# Patient Record
Sex: MALE | Race: WHITE | NOT HISPANIC OR LATINO | Employment: FULL TIME | URBAN - METROPOLITAN AREA
[De-identification: names, ages, dates, MRNs, and addresses within clinical notes are randomized per-mention and may not be internally consistent; named-entity substitution may affect disease eponyms.]

---

## 2017-01-10 ENCOUNTER — MYC MEDICAL ADVICE (OUTPATIENT)
Dept: FAMILY MEDICINE | Facility: CLINIC | Age: 52
End: 2017-01-10

## 2017-01-10 ENCOUNTER — TELEPHONE (OUTPATIENT)
Dept: FAMILY MEDICINE | Facility: CLINIC | Age: 52
End: 2017-01-10

## 2017-01-10 DIAGNOSIS — E29.1 MALE HYPOGONADISM: Primary | Chronic | ICD-10-CM

## 2017-01-10 NOTE — TELEPHONE ENCOUNTER
Which would be most effective? Pt is fine either way He is worried that he is  not getting the same dosage that he was previously which is his main concern. He said if this is what you want him to do then he is fine with it.    Danica Coley MA

## 2017-01-10 NOTE — TELEPHONE ENCOUNTER
Spoke with pt and he said in the past he was doing 200mg once a month, he just wanted to make sure that the new rx was what you wanted.      Danica Coley

## 2017-01-10 NOTE — TELEPHONE ENCOUNTER
Reason for Call:  Other prescription    Detailed comments: Pt has a question regarding the testosterone  prescripiton that was written and how it was filled     Phone Number Patient can be reached at: Cell number on file:    Telephone Information:   Mobile [phone]       Best Time: Anytime    Can we leave a detailed message on this number? YES    Call taken on 1/10/2017 at 10:58 AM by Torito Garcia

## 2017-01-10 NOTE — TELEPHONE ENCOUNTER
Oh, typically this is dosed every 2 weeks. But if he is always done it once a month, I guess I'd be fine with that. Which weighed as he prefer? Them I will gladly send in the correct prescription

## 2017-01-11 RX ORDER — TESTOSTERONE CYPIONATE 200 MG/ML
100 INJECTION, SOLUTION INTRAMUSCULAR
Qty: 4 ML | Refills: 5 | Status: SHIPPED | OUTPATIENT
Start: 2017-01-11 | End: 2018-12-10

## 2017-07-29 DIAGNOSIS — J45.40 MODERATE PERSISTENT ASTHMA WITHOUT COMPLICATION: Chronic | ICD-10-CM

## 2017-07-29 DIAGNOSIS — E78.5 HYPERLIPIDEMIA LDL GOAL <130: Chronic | ICD-10-CM

## 2017-07-31 NOTE — TELEPHONE ENCOUNTER
montelukast (SINGULAIR) 10 MG tablet      Last Written Prescription Date: 10/27/16  Last Fill Quantity: 90,  # refills: 2   Last Office Visit with [facility], [facility] or [facility] prescribing provider: 11/30/16

## 2017-08-02 ENCOUNTER — MYC REFILL (OUTPATIENT)
Dept: FAMILY MEDICINE | Facility: CLINIC | Age: 52
End: 2017-08-02

## 2017-08-02 ENCOUNTER — MYC MEDICAL ADVICE (OUTPATIENT)
Dept: FAMILY MEDICINE | Facility: CLINIC | Age: 52
End: 2017-08-02

## 2017-08-02 DIAGNOSIS — J45.40 MODERATE PERSISTENT ASTHMA WITHOUT COMPLICATION: Chronic | ICD-10-CM

## 2017-08-02 DIAGNOSIS — E78.5 HYPERLIPIDEMIA LDL GOAL <130: Chronic | ICD-10-CM

## 2017-08-02 RX ORDER — ATORVASTATIN CALCIUM 80 MG/1
80 TABLET, FILM COATED ORAL DAILY
Qty: 60 TABLET | Refills: 0 | Status: SHIPPED | OUTPATIENT
Start: 2017-08-02 | End: 2017-08-02

## 2017-08-02 RX ORDER — MONTELUKAST SODIUM 10 MG/1
TABLET ORAL
Qty: 90 TABLET | Refills: 2 | Status: SHIPPED | OUTPATIENT
Start: 2017-08-02 | End: 2018-07-20

## 2017-08-02 RX ORDER — MONTELUKAST SODIUM 10 MG/1
10 TABLET ORAL AT BEDTIME
Qty: 30 TABLET | Refills: 1 | Status: SHIPPED | OUTPATIENT
Start: 2017-08-02 | End: 2017-08-02

## 2017-08-02 RX ORDER — MONTELUKAST SODIUM 10 MG/1
10 TABLET ORAL AT BEDTIME
Qty: 30 TABLET | Refills: 4 | Status: SHIPPED | OUTPATIENT
Start: 2017-08-02 | End: 2017-12-08

## 2017-08-02 RX ORDER — ATORVASTATIN CALCIUM 80 MG/1
80 TABLET, FILM COATED ORAL DAILY
Qty: 30 TABLET | Refills: 4 | Status: SHIPPED | OUTPATIENT
Start: 2017-08-02 | End: 2017-12-08

## 2017-08-02 NOTE — TELEPHONE ENCOUNTER
2 month refill of lipitor only. Needs to come in for OV and fasting labs to continue it much longer.  VÍCTOR Mccloud, NP-C

## 2017-08-02 NOTE — TELEPHONE ENCOUNTER
Did not realize he had appointment for in Nov/Dec. Will give 4 months refill.  VÍCTOR Mccloud, NP-C

## 2017-08-02 NOTE — TELEPHONE ENCOUNTER
Prescription approved per [facility] Refill Protocol.  Radha Jaramillo RN.     Routing refill request to provider for review/approval because:  Labs out of range:  FLP  Radha Jaramillo RN.

## 2017-08-02 NOTE — TELEPHONE ENCOUNTER
Message from MyChart:  Original authorizing provider: MD Juan Patrick would like a refill of the following medications:  montelukast (SINGULAIR) 10 MG tablet [Kelli Porter MD]  atorvastatin (LIPITOR) 80 MG tablet [Kelli Porter MD]    Preferred pharmacy: [id number] [id number] [address]    Comment:

## 2017-12-08 ENCOUNTER — DOCUMENTATION ONLY (OUTPATIENT)
Dept: LAB | Facility: CLINIC | Age: 52
End: 2017-12-08

## 2017-12-08 ENCOUNTER — OFFICE VISIT (OUTPATIENT)
Dept: FAMILY MEDICINE | Facility: CLINIC | Age: 52
End: 2017-12-08
Payer: COMMERCIAL

## 2017-12-08 VITALS
BODY MASS INDEX: 30.69 KG/M2 | TEMPERATURE: 98.8 F | SYSTOLIC BLOOD PRESSURE: 110 MMHG | DIASTOLIC BLOOD PRESSURE: 80 MMHG | RESPIRATION RATE: 12 BRPM | HEIGHT: 69 IN | HEART RATE: 77 BPM | WEIGHT: 207.2 LBS | OXYGEN SATURATION: 95 %

## 2017-12-08 DIAGNOSIS — Z00.00 ROUTINE GENERAL MEDICAL EXAMINATION AT A HEALTH CARE FACILITY: Primary | ICD-10-CM

## 2017-12-08 DIAGNOSIS — N52.9 ERECTILE DYSFUNCTION, UNSPECIFIED ERECTILE DYSFUNCTION TYPE: Primary | Chronic | ICD-10-CM

## 2017-12-08 DIAGNOSIS — E78.5 HYPERLIPIDEMIA LDL GOAL <130: Chronic | ICD-10-CM

## 2017-12-08 DIAGNOSIS — N52.9 ERECTILE DYSFUNCTION, UNSPECIFIED ERECTILE DYSFUNCTION TYPE: Chronic | ICD-10-CM

## 2017-12-08 DIAGNOSIS — Z23 NEED FOR PROPHYLACTIC VACCINATION AND INOCULATION AGAINST INFLUENZA: ICD-10-CM

## 2017-12-08 DIAGNOSIS — J45.40 MODERATE PERSISTENT ASTHMA WITHOUT COMPLICATION: Chronic | ICD-10-CM

## 2017-12-08 LAB
ALBUMIN SERPL-MCNC: 4.2 G/DL (ref 3.4–5)
ALP SERPL-CCNC: 107 U/L (ref 40–150)
ALT SERPL W P-5'-P-CCNC: 49 U/L (ref 0–70)
ANION GAP SERPL CALCULATED.3IONS-SCNC: 9 MMOL/L (ref 3–14)
AST SERPL W P-5'-P-CCNC: 23 U/L (ref 0–45)
BILIRUB SERPL-MCNC: 0.5 MG/DL (ref 0.2–1.3)
BUN SERPL-MCNC: 13 MG/DL (ref 7–30)
CALCIUM SERPL-MCNC: 9.4 MG/DL (ref 8.5–10.1)
CHLORIDE SERPL-SCNC: 104 MMOL/L (ref 94–109)
CHOLEST SERPL-MCNC: 142 MG/DL
CO2 SERPL-SCNC: 26 MMOL/L (ref 20–32)
CREAT SERPL-MCNC: 1.14 MG/DL (ref 0.66–1.25)
GFR SERPL CREATININE-BSD FRML MDRD: 68 ML/MIN/1.7M2
GLUCOSE SERPL-MCNC: 105 MG/DL (ref 70–99)
HDLC SERPL-MCNC: 65 MG/DL
LDLC SERPL CALC-MCNC: 55 MG/DL
NONHDLC SERPL-MCNC: 77 MG/DL
POTASSIUM SERPL-SCNC: 4.5 MMOL/L (ref 3.4–5.3)
PROT SERPL-MCNC: 7.6 G/DL (ref 6.8–8.8)
PSA SERPL-ACNC: 1.61 UG/L (ref 0–4)
SODIUM SERPL-SCNC: 139 MMOL/L (ref 133–144)
TRIGL SERPL-MCNC: 112 MG/DL

## 2017-12-08 PROCEDURE — G0103 PSA SCREENING: HCPCS | Performed by: FAMILY MEDICINE

## 2017-12-08 PROCEDURE — 99396 PREV VISIT EST AGE 40-64: CPT | Performed by: FAMILY MEDICINE

## 2017-12-08 PROCEDURE — 36415 COLL VENOUS BLD VENIPUNCTURE: CPT | Performed by: FAMILY MEDICINE

## 2017-12-08 PROCEDURE — 80061 LIPID PANEL: CPT | Performed by: FAMILY MEDICINE

## 2017-12-08 PROCEDURE — 80053 COMPREHEN METABOLIC PANEL: CPT | Performed by: FAMILY MEDICINE

## 2017-12-08 RX ORDER — NALTREXONE HYDROCHLORIDE 50 MG/1
TABLET, FILM COATED ORAL
COMMUNITY
Start: 2017-12-04 | End: 2020-09-17

## 2017-12-08 RX ORDER — BUPROPION HYDROCHLORIDE 300 MG/1
300 TABLET ORAL
COMMUNITY
Start: 2017-11-02 | End: 2020-09-17

## 2017-12-08 RX ORDER — VARDENAFIL HYDROCHLORIDE 20 MG/1
20 TABLET ORAL DAILY PRN
Qty: 6 TABLET | Refills: 1 | Status: SHIPPED | OUTPATIENT
Start: 2017-12-08 | End: 2018-12-10

## 2017-12-08 RX ORDER — ATORVASTATIN CALCIUM 80 MG/1
80 TABLET, FILM COATED ORAL DAILY
Qty: 90 TABLET | Refills: 3 | Status: SHIPPED | OUTPATIENT
Start: 2017-12-08 | End: 2018-12-10

## 2017-12-08 RX ORDER — ALBUTEROL SULFATE 90 UG/1
2 AEROSOL, METERED RESPIRATORY (INHALATION) EVERY 4 HOURS PRN
Qty: 1 INHALER | Refills: 5 | Status: SHIPPED | OUTPATIENT
Start: 2017-12-08 | End: 2018-12-10

## 2017-12-08 RX ORDER — CHOLECALCIFEROL (VITAMIN D3) 50 MCG
2000 TABLET ORAL
COMMUNITY

## 2017-12-08 NOTE — MR AVS SNAPSHOT
After Visit Summary   12/8/2017    Juan Treadwell    MRN: [id number]           Patient Information     Date Of Birth          1965        Visit Information        Provider Department      12/8/2017 9:00 AM Kelli Porter MD [facility]        Today's Diagnoses     Routine general medical examination at a health care facility    -  1    Hyperlipidemia LDL goal <130        Moderate persistent asthma without complication        Erectile dysfunction, unspecified erectile dysfunction type        Need for prophylactic vaccination and inoculation against influenza          Care Instructions      Preventive Health Recommendations  Male Ages 50 - 64    Yearly exam:             See your health care provider every year in order to  o   Review health changes.   o   Discuss preventive care.    o   Review your medicines if your doctor has prescribed any.     Have a cholesterol test every 5 years, or more frequently if you are at risk for high cholesterol/heart disease.     Have a diabetes test (fasting glucose) every three years. If you are at risk for diabetes, you should have this test more often.     Have a colonoscopy at age 50, or have a yearly FIT test (stool test). These exams will check for colon cancer.      Talk with your health care provider about whether or not a prostate cancer screening test (PSA) is right for you.    You should be tested each year for STDs (sexually transmitted diseases), if you re at risk.     Shots: Get a flu shot each year. Get a tetanus shot every 10 years.     Nutrition:    Eat at least 5 servings of fruits and vegetables daily.     Eat whole-grain bread, whole-wheat pasta and brown rice instead of white grains and rice.     Talk to your provider about Calcium and Vitamin D.     Lifestyle    Exercise for at least 150 minutes a week (30 minutes a day, 5 days a week). This will help you control your weight and prevent disease.     Limit alcohol to one  "drink per day.     No smoking.     Wear sunscreen to prevent skin cancer.     See your dentist every six months for an exam and cleaning.     See your eye doctor every 1 to 2 years.            Follow-ups after your visit        Follow-up notes from your care team     Return in about 1 year (around 12/8/2018).      Who to contact     If you have questions or need follow up information about today's clinic visit or your schedule please contact [facility] directly at [phone].  Normal or non-critical lab and imaging results will be communicated to you by [employer], letter or phone within 4 business days after the clinic has received the results. If you do not hear from us within 7 days, please contact the clinic through [employer] or phone. If you have a critical or abnormal lab result, we will notify you by phone as soon as possible.  Submit refill requests through [employer] or call your pharmacy and they will forward the refill request to us. Please allow 3 business days for your refill to be completed.          Additional Information About Your Visit        [employer] Information     [employer] gives you secure access to your electronic health record. If you see a primary care provider, you can also send messages to your care team and make appointments. If you have questions, please call your primary care clinic.  If you do not have a primary care provider, please call [phone] and they will assist you.        Care EveryWhere ID     This is your Care EveryWhere ID. This could be used by other organizations to access your [place] medical records  [id number]        Your Vitals Were     Pulse Temperature Respirations Height Pulse Oximetry BMI (Body Mass Index)    77 98.8  F (37.1  C) (Oral) 12 1.753 m (5' 9\") 95% 30.6 kg/m2       Blood Pressure from Last 3 Encounters:   12/08/17 110/80   11/30/16 120/82   09/01/16 132/88    Weight from Last 3 Encounters:   12/08/17 94 kg (207 lb 3.2 oz)   11/30/16 100.9 " kg (222 lb 8 oz)   09/01/16 99.6 kg (219 lb 9.6 oz)              We Performed the Following     Comprehensive metabolic panel     Lipid panel reflex to direct LDL Fasting     Prostate spec antigen screen          Today's Medication Changes          These changes are accurate as of: 12/8/17 11:08 AM.  If you have any questions, ask your nurse or doctor.               Start taking these medicines.        Dose/Directions    vardenafil 20 MG tablet   Commonly known as:  LEVITRA   Used for:  Erectile dysfunction, unspecified erectile dysfunction type   Replaces:  tadalafil 5 MG tablet   Started by:  Kelli Porter MD        Dose:  20 mg   Take 1 tablet (20 mg) by mouth daily as needed 60 min prior to sex. Do not use with nitroglycerin, terazosin or doxazosin.   Quantity:  6 tablet   Refills:  1         Stop taking these medicines if you haven't already. Please contact your care team if you have questions.     tadalafil 5 MG tablet   Commonly known as:  CIALIS   Replaced by:  vardenafil 20 MG tablet   Stopped by:  Kelli Porter MD                Where to get your medicines      These medications were sent to [id number] - [place], [place] - [address] [address]  [address] [address], [facility] [address]     Phone:  [phone]     albuterol 108 (90 BASE) MCG/ACT Inhaler    atorvastatin 80 MG tablet    vardenafil 20 MG tablet                Primary Care Provider Office Phone # Fax #    Kelli Porter [phone] [address] [facility] [address]        Equal Access to Services     [facility] AH: Hadii aad ku hadasho Soomaali, waaxda luqadaha, qaybta kaalmada adeegyada, waxay katelynin hayparish esteban . So [facility] [phone].    ATENCIÓN: Si habla español, tiene a fragoso disposición servicios gratuitos de asistencia lingüística. Llame al [phone].    We comply with applicable federal civil rights laws and [place] laws. We do not  discriminate on the basis of race, color, national origin, age, disability, sex, sexual orientation, or gender identity.            Thank you!     Thank you for choosing [facility]  for your care. Our goal is always to provide you with excellent care. Hearing back from our patients is one way we can continue to improve our services. Please take a few minutes to complete the written survey that you may receive in the mail after your visit with us. Thank you!             Your Updated Medication List - Protect others around you: Learn how to safely use, store and throw away your medicines at www.disposemymeds.org.          This list is accurate as of: 12/8/17 11:08 AM.  Always use your most recent med list.                   Brand Name Dispense Instructions for use Diagnosis    acetaminophen 325 MG tablet    TYLENOL    100 tablet    Take 2 tablets (650 mg) by mouth 4 times daily    Perianal fistula       albuterol 108 (90 BASE) MCG/ACT Inhaler    PROAIR HFA/PROVENTIL HFA/VENTOLIN HFA    1 Inhaler    Inhale 2 puffs into the lungs every 4 hours as needed for shortness of breath / dyspnea or wheezing    Moderate persistent asthma without complication       atorvastatin 80 MG tablet    LIPITOR    90 tablet    Take 1 tablet (80 mg) by mouth daily    Hyperlipidemia LDL goal <130       buPROPion 300 MG 24 hr tablet    WELLBUTRIN XL     Take 300 mg by mouth        ibuprofen 600 MG tablet    ADVIL/MOTRIN    30 tablet    Take 1 tablet (600 mg) by mouth 3 times daily    Perianal fistula       montelukast 10 MG tablet    SINGULAIR    90 tablet    TAKE 1 TABLET (10 MG) BY MOUTH AT BEDTIME    Moderate persistent asthma without complication, Hyperlipidemia LDL goal <130       MULTIVITAMIN PO           naltrexone 50 MG tablet    DEPADE;REVIA     Take one tablet (50 mg) daily        * order for DME      Resmed Airsense 10 auto cpap 10-14 cm, Mirage Fx nasal mask std    DIAMOND (obstructive sleep apnea)       * order for DME      10 Units    Equipment being ordered: 1 cc syringe, with 23 gauge 1 inch needles Use twice monthly    Male hypogonadism       PRILOSEC PO      Take 10 mg by mouth        testosterone cypionate 200 MG/ML injection    DEPOTESTOTERONE    4 mL    Inject 0.5 mLs (100 mg) into the muscle every 14 days    Male hypogonadism       vardenafil 20 MG tablet    LEVITRA    6 tablet    Take 1 tablet (20 mg) by mouth daily as needed 60 min prior to sex. Do not use with nitroglycerin, terazosin or doxazosin.    Erectile dysfunction, unspecified erectile dysfunction type       vitamin D 2000 UNITS tablet      Take 2,000 Units by mouth        * Notice:  This list has 2 medication(s) that are the same as other medications prescribed for you. Read the directions carefully, and ask your doctor or other care provider to review them with you.

## 2017-12-08 NOTE — PROGRESS NOTES
SUBJECTIVE:   CC: Juan Treadwell is an 52 year old male who presents for preventative health visit.     Healthy Habits:    Do you get at least three servings of calcium containing foods daily (dairy, green leafy vegetables, etc.)? yes    Amount of exercise or daily activities, outside of work: 4 day(s) per week    Problems taking medications regularly No    Medication side effects: No    Have you had an eye exam in the past two years? no    Do you see a dentist twice per year? yes    Do you have sleep apnea, excessive snoring or daytime drowsiness?yes              Today's PHQ-2 Score:   PHQ-2 ( 1999 Pfizer) 11/30/2016 11/28/2016   Q1: Little interest or pleasure in doing things 0 -   Q2: Feeling down, depressed or hopeless 0 -   PHQ-2 Score 0 -   Q1: Little interest or pleasure in doing things - Not at all   Q2: Feeling down, depressed or hopeless - Not at all   PHQ-2 Score - 0         Abuse: Current or Past(Physical, Sexual or Emotional)- No  Do you feel safe in your environment - Yes  Social History   Substance Use Topics     Smoking status: Never Smoker     Smokeless tobacco: Never Used      Comment: no 2nd hand smoke exposure     Alcohol use 1.8 - 2.4 oz/week     3 - 4 Standard drinks or equivalent per week      Comment: 3-4 drinks weekly     The patient does not drink >3 drinks per day nor >7 drinks per week.    Last PSA:   PSA   Date Value Ref Range Status   11/30/2016 1.49 0 - 4 ug/L Final     Comment:     Assay Method:  Chemiluminescence using Siemens Vista analyzer       Reviewed orders with patient. Reviewed health maintenance and updated orders accordingly - Yes  Labs reviewed in EPIC  BP Readings from Last 3 Encounters:   12/08/17 110/80   11/30/16 120/82   09/01/16 132/88    Wt Readings from Last 3 Encounters:   12/08/17 94 kg (207 lb 3.2 oz)   11/30/16 100.9 kg (222 lb 8 oz)   09/01/16 99.6 kg (219 lb 9.6 oz)                      Reviewed and updated as needed this visit by clinical staffTobacco   "Allergies  Meds  Med Hx  Surg Hx  Fam Hx  Soc Hx        Reviewed and updated as needed this visit by Provider        Here today for routine checkup  Patient now on Wellbutrin and naltrexone for weight loss and it is working great. Feels fantastic.  Still struggles with erectile dysfunction. We didn't get much benefit from Viagra. His been using Cialis and initially that worked better but now seems to be wearing off. Off treatment for low testosterone but says he doesn't seem to notice much difference one way or the other      ROS:  C: NEGATIVE for fever, chills, change in weight  I: NEGATIVE for worrisome rashes, moles or lesions  E: NEGATIVE for vision changes or irritation  ENT: NEGATIVE for ear, mouth and throat problems  R: NEGATIVE for significant cough or SOB  CV: NEGATIVE for chest pain, palpitations or peripheral edema  GI: NEGATIVE for nausea, abdominal pain, heartburn, or change in bowel habits   male: As above  M: NEGATIVE for significant arthralgias or myalgia  N: NEGATIVE for weakness, dizziness or paresthesias  P: NEGATIVE for changes in mood or affect    OBJECTIVE:   /80 (BP Location: Right arm, Patient Position: Sitting, Cuff Size: Adult Regular)  Pulse 77  Temp 98.8  F (37.1  C) (Oral)  Resp 12  Ht 1.753 m (5' 9\")  Wt 94 kg (207 lb 3.2 oz)  SpO2 95%  BMI 30.6 kg/m2  EXAM:  GENERAL: healthy, alert and no distress  EYES: Eyes grossly normal to inspection, PERRL and conjunctivae and sclerae normal  HENT: ear canals and TM's normal, nose and mouth without ulcers or lesions  NECK: no adenopathy, no asymmetry, masses, or scars and thyroid normal to palpation  RESP: lungs clear to auscultation - no rales, rhonchi or wheezes  CV: regular rate and rhythm, normal S1 S2, no S3 or S4, no murmur, click or rub, no peripheral edema and peripheral pulses strong  ABDOMEN: soft, nontender, no hepatosplenomegaly, no masses and bowel sounds normal  MS: no gross musculoskeletal defects noted, no " "edema  SKIN: no suspicious lesions or rashes  NEURO: Normal strength and tone, mentation intact and speech normal  PSYCH: mentation appears normal, affect normal/bright    ASSESSMENT/PLAN:   1. Routine general medical examination at a health care facility  Health care maintenance up to date otherwise   - Lipid panel reflex to direct LDL Fasting  - Prostate spec antigen screen    2. Hyperlipidemia LDL goal <130    - atorvastatin (LIPITOR) 80 MG tablet; Take 1 tablet (80 mg) by mouth daily  Dispense: 90 tablet; Refill: 3  - Lipid panel reflex to direct LDL Fasting  - Comprehensive metabolic panel    3. Moderate persistent asthma without complication    - albuterol (PROAIR HFA/PROVENTIL HFA/VENTOLIN HFA) 108 (90 BASE) MCG/ACT Inhaler; Inhale 2 puffs into the lungs every 4 hours as needed for shortness of breath / dyspnea or wheezing  Dispense: 1 Inhaler; Refill: 5    4. Erectile dysfunction, unspecified erectile dysfunction type  Discussed mechanism of action of the proposed medication, as well as potential effects, both good and bad.  Patient expressed understanding and agreed with treatment.   - vardenafil (LEVITRA) 20 MG tablet; Take 1 tablet (20 mg) by mouth daily as needed 60 min prior to sex. Do not use with nitroglycerin, terazosin or doxazosin.  Dispense: 6 tablet; Refill: 1    5. Need for prophylactic vaccination and inoculation against influenza        COUNSELING:  Reviewed preventive health counseling, as reflected in patient instructions       Regular exercise       Healthy diet/nutrition       Vision screening       Colon cancer screening       Prostate cancer screening           reports that he has never smoked. He has never used smokeless tobacco.      Estimated body mass index is 30.6 kg/(m^2) as calculated from the following:    Height as of this encounter: 1.753 m (5' 9\").    Weight as of this encounter: 94 kg (207 lb 3.2 oz).         Counseling Resources:  ATP IV Guidelines  Pooled Cohorts Equation " Calculator  FRAX Risk Assessment  ICSI Preventive Guidelines  Dietary Guidelines for Americans, 2010  USDA's MyPlate  ASA Prophylaxis  Lung CA Screening    Kelli Porter MD  [facility]

## 2017-12-08 NOTE — NURSING NOTE
"Chief Complaint   Patient presents with     Physical     fasting       Initial /80 (BP Location: Right arm, Patient Position: Sitting, Cuff Size: Adult Regular)  Pulse 77  Temp 98.8  F (37.1  C) (Oral)  Resp 12  Ht 1.753 m (5' 9\")  Wt 94 kg (207 lb 3.2 oz)  SpO2 95%  BMI 30.6 kg/m2 Estimated body mass index is 30.6 kg/(m^2) as calculated from the following:    Height as of this encounter: 1.753 m (5' 9\").    Weight as of this encounter: 94 kg (207 lb 3.2 oz).  Medication Reconciliation: diana Coley        "

## 2017-12-08 NOTE — PROGRESS NOTES
Patient is requesting to have Testosterone level checked, seen by Physician 12/08/17. Please place orders if needed. CMB

## 2017-12-09 ASSESSMENT — ASTHMA QUESTIONNAIRES: ACT_TOTALSCORE: 25

## 2018-07-20 DIAGNOSIS — N52.9 ERECTILE DYSFUNCTION, UNSPECIFIED ERECTILE DYSFUNCTION TYPE: Primary | Chronic | ICD-10-CM

## 2018-07-20 DIAGNOSIS — J45.40 MODERATE PERSISTENT ASTHMA WITHOUT COMPLICATION: Chronic | ICD-10-CM

## 2018-07-20 DIAGNOSIS — E78.5 HYPERLIPIDEMIA LDL GOAL <130: Chronic | ICD-10-CM

## 2018-07-20 NOTE — TELEPHONE ENCOUNTER
"Requested Prescriptions   Pending Prescriptions Disp Refills     CIALIS 5 MG tablet [Pharmacy Med Name: Cialis Oral Tablet 5 MG]  Last Written Prescription Date:  10/23/17  Last Fill Quantity: 90 tablet,  # refills: 1   Last office visit: 12/8/2017 with prescribing provider:  Dr. Porter   Routing refill request to provider for review/approval because:  Drug not active on patient's medication list  Future Office Visit:   30 tablet 0     Sig: TAKE 1 TABLET (5 MG) BY MOUTH DAILY    Erectile Dysfuction Protocol Passed    7/20/2018  1:41 AM       Passed - Absence of nitrates on medication list       Passed - Absence of Alpha Blockers on Med list       Passed - Recent (12 mo) or future (30 days) visit within the authorizing provider's specialty    Patient had office visit in the last 12 months or has a visit in the next 30 days with authorizing provider or within the authorizing provider's specialty.  See \"Patient Info\" tab in inbasket, or \"Choose Columns\" in Meds & Orders section of the refill encounter.           Passed - Patient is age 18 or older                montelukast (SINGULAIR) 10 MG tablet [Pharmacy Med Name: Montelukast Sodium Oral Tablet 10 MG]  Last Written Prescription Date:  8/2/17  Last Fill Quantity: 90 tablet,  # refills: 2   Last office visit: 12/8/2017 with prescribing provider:  Dr. Porter   Future Office Visit:     30 tablet 0     Sig: TAKE 1 TABLET (10 MG) BY MOUTH AT BEDTIME    Leukotriene Inhibitors Protocol Failed    7/20/2018  1:41 AM       Failed - Asthma control assessment score within normal limits in last 6 months    Please review ACT score.   ACT Total Scores 11/30/2016 12/8/2017   ACT TOTAL SCORE (Goal Greater than or Equal to 20) 25 25   In the past 12 months, how many times did you visit the emergency room for your asthma without being admitted to the hospital? 0 0   In the past 12 months, how many times were you hospitalized overnight because of your asthma? 0 0          Failed - " "Recent (6 mo) or future (30 days) visit within the authorizing provider's specialty    Patient had office visit in the last 6 months or has a visit in the next 30 days with authorizing provider or within the authorizing provider's specialty.  See \"Patient Info\" tab in inbasket, or \"Choose Columns\" in Meds & Orders section of the refill encounter.           Passed - Patient is age 12 or older    If patient is under 16, ok to refill using age based dosing.             "

## 2018-07-23 NOTE — TELEPHONE ENCOUNTER
Routing refill request to provider for review/approval because:Montelukast Sodium Oral Tablet 10 MG  A break in medication      Routing refill request to provider for review/approval because:  Drug not active on patient's medication list      Roxana Arroyo RN

## 2018-07-24 RX ORDER — MONTELUKAST SODIUM 10 MG/1
TABLET ORAL
Qty: 90 TABLET | Refills: 0 | Status: SHIPPED | OUTPATIENT
Start: 2018-07-24 | End: 2018-10-20

## 2018-07-24 RX ORDER — TADALAFIL 5 MG
TABLET ORAL
Qty: 30 TABLET | Refills: 0 | Status: SHIPPED | OUTPATIENT
Start: 2018-07-24 | End: 2018-09-02

## 2018-08-30 DIAGNOSIS — G47.33 OSA (OBSTRUCTIVE SLEEP APNEA): Primary | Chronic | ICD-10-CM

## 2018-09-02 DIAGNOSIS — N52.9 ERECTILE DYSFUNCTION, UNSPECIFIED ERECTILE DYSFUNCTION TYPE: Chronic | ICD-10-CM

## 2018-09-04 NOTE — TELEPHONE ENCOUNTER
"Requested Prescriptions   Pending Prescriptions Disp Refills     CIALIS 5 MG tablet [Pharmacy Med Name: Cialis Oral Tablet 5 MG] 30 tablet 0     Sig: TAKE ONE TABLET BY MOUTH ONE TIME DAILY    Erectile Dysfuction Protocol Passed    9/2/2018 11:00 PM       Passed - Absence of nitrates on medication list       Passed - Absence of Alpha Blockers on Med list       Passed - Recent (12 mo) or future (30 days) visit within the authorizing provider's specialty    Patient had office visit in the last 12 months or has a visit in the next 30 days with authorizing provider or within the authorizing provider's specialty.  See \"Patient Info\" tab in inbasket, or \"Choose Columns\" in Meds & Orders section of the refill encounter.           Passed - Patient is age 18 or older      CIALIS 5 MG tablet  Last Written Prescription Date:  7/24/18  Last Fill Quantity: 30,  # refills: 0   Last office visit: 12/8/2017 with prescribing provider:  Dr. Porter   Future Office Visit:    "

## 2018-09-06 RX ORDER — TADALAFIL 5 MG
TABLET ORAL
Qty: 30 TABLET | Refills: 0 | Status: SHIPPED | OUTPATIENT
Start: 2018-09-06 | End: 2018-10-17

## 2018-09-06 NOTE — TELEPHONE ENCOUNTER
Prescription approved per [facility] Refill Protocol.    Robin Rojas RN, BSN

## 2018-10-20 DIAGNOSIS — J45.40 MODERATE PERSISTENT ASTHMA WITHOUT COMPLICATION: Chronic | ICD-10-CM

## 2018-10-22 NOTE — TELEPHONE ENCOUNTER
"Requested Prescriptions   Pending Prescriptions Disp Refills     montelukast (SINGULAIR) 10 MG tablet [Pharmacy Med Name: Montelukast Sodium Oral Tablet 10 MG] 90 tablet 0     Sig: TAKE ONE TABLET BY MOUTH AT BEDTIME    Leukotriene Inhibitors Protocol Failed    10/20/2018 12:48 AM       Failed - Asthma control assessment score within normal limits in last 6 months    Please review ACT score.          Failed - Recent (6 mo) or future (30 days) visit within the authorizing provider's specialty    Patient had office visit in the last 6 months or has a visit in the next 30 days with authorizing provider or within the authorizing provider's specialty.  See \"Patient Info\" tab in inbasket, or \"Choose Columns\" in Meds & Orders section of the refill encounter.           Passed - Patient is age 12 or older    If patient is under 16, ok to refill using age based dosing.         montelukast (SINGULAIR) 10 MG tablet  Last Written Prescription Date:  7/24/18  Last Fill Quantity: 90,  # refills: 0   Last office visit: 12/8/2017 with prescribing provider:  Dr. Porter   Future Office Visit:   Next 5 appointments (look out 90 days)     Dec 10, 2018  9:00 AM CST   PHYS[facility] Kelli Porter MD   [facility]    [address] [place] [id number] [phone]                   "

## 2018-10-23 RX ORDER — MONTELUKAST SODIUM 10 MG/1
TABLET ORAL
Qty: 90 TABLET | Refills: 0 | Status: SHIPPED | OUTPATIENT
Start: 2018-10-23 | End: 2018-12-10

## 2018-10-23 NOTE — TELEPHONE ENCOUNTER
Routing refill request to provider for review/approval because:  Not current:  ACT  Last OV >6 months.  Payton Maguire RN

## 2018-12-10 ENCOUNTER — OFFICE VISIT (OUTPATIENT)
Dept: FAMILY MEDICINE | Facility: CLINIC | Age: 53
End: 2018-12-10
Payer: COMMERCIAL

## 2018-12-10 VITALS
HEART RATE: 81 BPM | SYSTOLIC BLOOD PRESSURE: 122 MMHG | DIASTOLIC BLOOD PRESSURE: 78 MMHG | WEIGHT: 210 LBS | OXYGEN SATURATION: 91 % | HEIGHT: 69 IN | BODY MASS INDEX: 31.1 KG/M2 | TEMPERATURE: 98.4 F

## 2018-12-10 DIAGNOSIS — Z00.00 ROUTINE GENERAL MEDICAL EXAMINATION AT A HEALTH CARE FACILITY: Primary | ICD-10-CM

## 2018-12-10 DIAGNOSIS — Z23 NEED FOR PROPHYLACTIC VACCINATION AND INOCULATION AGAINST INFLUENZA: ICD-10-CM

## 2018-12-10 DIAGNOSIS — Z11.4 SCREENING FOR HIV (HUMAN IMMUNODEFICIENCY VIRUS): ICD-10-CM

## 2018-12-10 DIAGNOSIS — E78.5 HYPERLIPIDEMIA LDL GOAL <130: Chronic | ICD-10-CM

## 2018-12-10 DIAGNOSIS — M25.512 LEFT SHOULDER PAIN, UNSPECIFIED CHRONICITY: ICD-10-CM

## 2018-12-10 DIAGNOSIS — J45.40 MODERATE PERSISTENT ASTHMA WITHOUT COMPLICATION: Chronic | ICD-10-CM

## 2018-12-10 LAB
ALBUMIN SERPL-MCNC: 4.2 G/DL (ref 3.4–5)
ALP SERPL-CCNC: 101 U/L (ref 40–150)
ALT SERPL W P-5'-P-CCNC: 46 U/L (ref 0–70)
ANION GAP SERPL CALCULATED.3IONS-SCNC: 9 MMOL/L (ref 3–14)
AST SERPL W P-5'-P-CCNC: 26 U/L (ref 0–45)
BILIRUB SERPL-MCNC: 0.4 MG/DL (ref 0.2–1.3)
BUN SERPL-MCNC: 14 MG/DL (ref 7–30)
CALCIUM SERPL-MCNC: 9.4 MG/DL (ref 8.5–10.1)
CHLORIDE SERPL-SCNC: 107 MMOL/L (ref 94–109)
CHOLEST SERPL-MCNC: 170 MG/DL
CO2 SERPL-SCNC: 24 MMOL/L (ref 20–32)
CREAT SERPL-MCNC: 1.17 MG/DL (ref 0.66–1.25)
GFR SERPL CREATININE-BSD FRML MDRD: 65 ML/MIN/1.7M2
GLUCOSE SERPL-MCNC: 104 MG/DL (ref 70–99)
HDLC SERPL-MCNC: 62 MG/DL
LDLC SERPL CALC-MCNC: 81 MG/DL
NONHDLC SERPL-MCNC: 108 MG/DL
POTASSIUM SERPL-SCNC: 4.2 MMOL/L (ref 3.4–5.3)
PROT SERPL-MCNC: 7.7 G/DL (ref 6.8–8.8)
PSA SERPL-ACNC: 1.91 UG/L (ref 0–4)
SODIUM SERPL-SCNC: 140 MMOL/L (ref 133–144)
TRIGL SERPL-MCNC: 135 MG/DL

## 2018-12-10 PROCEDURE — 99396 PREV VISIT EST AGE 40-64: CPT | Performed by: FAMILY MEDICINE

## 2018-12-10 PROCEDURE — 80053 COMPREHEN METABOLIC PANEL: CPT | Performed by: FAMILY MEDICINE

## 2018-12-10 PROCEDURE — 80061 LIPID PANEL: CPT | Performed by: FAMILY MEDICINE

## 2018-12-10 PROCEDURE — G0103 PSA SCREENING: HCPCS | Performed by: FAMILY MEDICINE

## 2018-12-10 PROCEDURE — 36415 COLL VENOUS BLD VENIPUNCTURE: CPT | Performed by: FAMILY MEDICINE

## 2018-12-10 RX ORDER — MONTELUKAST SODIUM 10 MG/1
1 TABLET ORAL AT BEDTIME
Qty: 90 TABLET | Refills: 3 | Status: SHIPPED | OUTPATIENT
Start: 2018-12-10 | End: 2019-12-11

## 2018-12-10 RX ORDER — ATORVASTATIN CALCIUM 80 MG/1
80 TABLET, FILM COATED ORAL DAILY
Qty: 90 TABLET | Refills: 3 | Status: SHIPPED | OUTPATIENT
Start: 2018-12-10 | End: 2019-12-11

## 2018-12-10 ASSESSMENT — PAIN SCALES - GENERAL: PAINLEVEL: SEVERE PAIN (6)

## 2018-12-10 ASSESSMENT — MIFFLIN-ST. JEOR: SCORE: 1788.18

## 2018-12-10 NOTE — PROGRESS NOTES
SUBJECTIVE:   CC: Juan Treadwell is an 53 year old male who presents for preventive health visit.     Healthy Habits:    Do you get at least three servings of calcium containing foods daily (dairy, green leafy vegetables, etc.)? yes    Amount of exercise or daily activities, outside of work: 5 day(s) per week    Problems taking medications regularly No    Medication side effects: No    Have you had an eye exam in the past two years? yes    Do you see a dentist twice per year? yes    Do you have sleep apnea, excessive snoring or daytime drowsiness? Yes- patient is experiencing itchiness at night           Today's PHQ-2 Score:   PHQ-2 ( 1999 Pfizer) 12/10/2018 11/30/2016   Q1: Little interest or pleasure in doing things 0 0   Q2: Feeling down, depressed or hopeless 0 0   PHQ-2 Score 0 0   Q1: Little interest or pleasure in doing things - -   Q2: Feeling down, depressed or hopeless - -   PHQ-2 Score - -       Abuse: Current or Past(Physical, Sexual or Emotional)- No  Do you feel safe in your environment? Yes    Social History     Tobacco Use     Smoking status: Never Smoker     Smokeless tobacco: Never Used     Tobacco comment: no 2nd hand smoke exposure   Substance Use Topics     Alcohol use: Yes     Alcohol/week: 1.8 - 2.4 oz     Types: 3 - 4 Standard drinks or equivalent per week     Comment: 3-4 drinks weekly     If you drink alcohol do you typically have >3 drinks per day or >7 drinks per week? No                      Last PSA:   PSA   Date Value Ref Range Status   12/08/2017 1.61 0 - 4 ug/L Final     Comment:     Assay Method:  Chemiluminescence using Siemens Vista analyzer       Reviewed orders with patient. Reviewed health maintenance and updated orders accordingly - Yes  Labs reviewed in EPIC  BP Readings from Last 3 Encounters:   12/10/18 122/78   12/08/17 110/80   11/30/16 120/82    Wt Readings from Last 3 Encounters:   12/10/18 95.3 kg (210 lb)   12/08/17 94 kg (207 lb 3.2 oz)   11/30/16 100.9 kg (222 lb  "8 oz)                    Reviewed and updated as needed this visit by clinical staff  Tobacco  Allergies  Meds  Problems         Reviewed and updated as needed this visit by Provider  Problems        Here today for routine checkup.  Doing well overall.  Feeling great.  Has decided he is going to back off on the weight loss medications for a little while but is still keeping up with exercise.  Cialis is working well to help with erectile dysfunction.    ROS:  CONSTITUTIONAL: NEGATIVE for fever, chills, change in weight  INTEGUMENTARY/SKIN: NEGATIVE for worrisome rashes, moles or lesions  EYES: NEGATIVE for vision changes or irritation  ENT: NEGATIVE for ear, mouth and throat problems  RESP: NEGATIVE for significant cough or SOB  CV: NEGATIVE for chest pain, palpitations or peripheral edema  GI: NEGATIVE for nausea, abdominal pain, heartburn, or change in bowel habits   male: negative for dysuria, hematuria, decreased urinary stream, erectile dysfunction, urethral discharge  MUSCULOSKELETAL: NEGATIVE for significant arthralgias or myalgia  NEURO: NEGATIVE for weakness, dizziness or paresthesias  PSYCHIATRIC: NEGATIVE for changes in mood or affect    OBJECTIVE:   /78 (BP Location: Right arm, Patient Position: Chair, Cuff Size: Adult Large)   Pulse 81   Temp 98.4  F (36.9  C) (Oral)   Ht 1.745 m (5' 8.7\")   Wt 95.3 kg (210 lb)   SpO2 91%   BMI 31.28 kg/m    EXAM:  GENERAL: healthy, alert and no distress  EYES: Eyes grossly normal to inspection, PERRL and conjunctivae and sclerae normal  HENT: ear canals and TM's normal, nose and mouth without ulcers or lesions  NECK: no adenopathy, no asymmetry, masses, or scars and thyroid normal to palpation  RESP: lungs clear to auscultation - no rales, rhonchi or wheezes  CV: regular rate and rhythm, normal S1 S2, no S3 or S4, no murmur, click or rub, no peripheral edema and peripheral pulses strong  ABDOMEN: soft, nontender, no hepatosplenomegaly, no masses and " "bowel sounds normal  MS: no gross musculoskeletal defects noted, no edema  SKIN: no suspicious lesions or rashes  NEURO: Normal strength and tone, mentation intact and speech normal  PSYCH: mentation appears normal, affect normal/bright    Diagnostic Test Results:  none     ASSESSMENT/PLAN:   1. Routine general medical examination at a health care facility  Routine healthcare maintenance up-to-date  - Lipid panel reflex to direct LDL Fasting  - Prostate spec antigen screen    2. Hyperlipidemia LDL goal <130    - atorvastatin (LIPITOR) 80 MG tablet; Take 1 tablet (80 mg) by mouth daily  Dispense: 90 tablet; Refill: 3  - Lipid panel reflex to direct LDL Fasting  - Comprehensive metabolic panel    3. Moderate persistent asthma without complication    - montelukast (SINGULAIR) 10 MG tablet; Take 1 tablet (10 mg) by mouth At Bedtime  Dispense: 90 tablet; Refill: 3    4. Need for prophylactic vaccination and inoculation against influenza    COUNSELING:  Reviewed preventive health counseling, as reflected in patient instructions       Regular exercise       Healthy diet/nutrition       Vision screening       Colon cancer screening       Prostate cancer screening    BP Readings from Last 1 Encounters:   12/10/18 122/78     Estimated body mass index is 31.28 kg/m  as calculated from the following:    Height as of this encounter: 1.745 m (5' 8.7\").    Weight as of this encounter: 95.3 kg (210 lb).           reports that  has never smoked. he has never used smokeless tobacco.      Counseling Resources:  ATP IV Guidelines  Pooled Cohorts Equation Calculator  FRAX Risk Assessment  ICSI Preventive Guidelines  Dietary Guidelines for Americans, 2010  USDA's MyPlate  ASA Prophylaxis  Lung CA Screening    Kelli Porter MD  [facility]  "

## 2018-12-11 ASSESSMENT — ASTHMA QUESTIONNAIRES: ACT_TOTALSCORE: 25

## 2018-12-13 ENCOUNTER — THERAPY VISIT (OUTPATIENT)
Dept: PHYSICAL THERAPY | Facility: CLINIC | Age: 53
End: 2018-12-13
Attending: FAMILY MEDICINE
Payer: COMMERCIAL

## 2018-12-13 DIAGNOSIS — M25.512 LEFT SHOULDER PAIN, UNSPECIFIED CHRONICITY: ICD-10-CM

## 2018-12-13 PROCEDURE — 97112 NEUROMUSCULAR REEDUCATION: CPT | Mod: GP | Performed by: PHYSICAL THERAPIST

## 2018-12-13 PROCEDURE — 97161 PT EVAL LOW COMPLEX 20 MIN: CPT | Mod: GP | Performed by: PHYSICAL THERAPIST

## 2018-12-13 PROCEDURE — 97140 MANUAL THERAPY 1/> REGIONS: CPT | Mod: GP | Performed by: PHYSICAL THERAPIST

## 2018-12-13 NOTE — PROGRESS NOTES
[facility] Initial Evaluation  Subjective:  The history is provided by the patient. No  was used.   Juan Treadwell is a 53 year old male with a left shoulder condition.  Condition occurred with:  Unknown cause.  Condition occurred: other.  This is a new condition  10/13/2018.        Pain is described as aching and burning  and reported as 4/10.   Pain is worse in the P.M..  Symptoms are exacerbated by carrying, rest and using arm overhead   Since onset symptoms are unchanged.    Previous treatment includes other (injection).  There was no improvement following previous treatment.    Past medical history: asthma.  Medical allergies: no.    Current medications:  None as reported by the patient.  Current occupation is   .  Patient is working in normal job without restrictions.  Primary job tasks include:  Prolonged sitting.    Barriers include:  None as reported by the patient.    Red flags:  None as reported by the patient.    Goal: lift the left arm, strengthen the left arm and sleeping without increased pain.                    Objective:  Standing Alignment:    Cervical/Thoracic:  Forward head  Shoulder/UE:  Rounded shoulders                                       Shoulder Evaluation:  ROM:  AROM:    Flexion:  Left:  150    Right:  145    Abduction:  Left: 155   Right:  155    Internal Rotation:  Left:  T8    Right:  T10  External Rotation:  Left:  60    Right:  45                      Strength:    Flexion: Left:4+/5 Strong/painful    Pain:    Right: 4+/5  Strong/pain free     Pain:   Extension:  Left: 4/5  Strong/pain free    Pain:    Right: 4-/5    Strong/pain free  Pain:  Abduction:  Left: 4/5  Pain:    Right: 4+/5     Pain:    Internal Rotation:  Left:4+/5   Strong/pain free    Pain:    Right: 4+/5   Strong/pain free    Pain:  External Rotation:   Left:4-/5   Strong/painful    Pain:   Right:4+/5   Strong/pain free    Pain:    Horizontal Abduction:   Left:4-/5   Strong/pain free    Pain:    Right:4-/5   Strong/pain free   Pain:          Special Tests:      Left shoulder negative for the following special tests:  Impingement    Right shoulder negative for the following special tests:Impingement  Palpation:    Left shoulder tenderness present at:  Supraspinatus; Teres Minor; Subscapularis and Levator  Right shoulder tenderness present at: Teres Minor; Subscapularis and Levator                                     General     ROS    Assessment/Plan:    Patient is a 53 year old male with Left Shoulder complaints.    Patient has the following significant findings with corresponding treatment plan.                Diagnosis 1:  Left Shoulder Pain  Pain -  hot/cold therapy, US, electric stimulation, manual therapy, STS, splint/taping/bracing/orthotics, self management, education, directional preference exercise and home program  Decreased ROM/flexibility - manual therapy and therapeutic exercise  Decreased joint mobility - manual therapy and therapeutic exercise  Decreased strength - therapeutic exercise and therapeutic activities  Impaired posture - neuro re-education    Therapy Evaluation Codes:   1) History comprised of:   Personal factors that impact the plan of care:      None.    Comorbidity factors that impact the plan of care are:      asthma.     Medications impacting care: None.  2) Examination of Body Systems comprised of:   Body structures and functions that impact the plan of care:      B shoulders.   Activity limitations that impact the plan of care are:      lifting overhead, reaching, sleeping supine.  3) Clinical presentation characteristics are:   Stable/Uncomplicated.  4) Decision-Making    Low complexity using standardized patient assessment instrument and/or measureable assessment of functional outcome.  Cumulative Therapy Evaluation is: Low complexity.    Previous and current functional limitations:  (See Goal Flow Sheet for this information)     Short term and Long term goals: (See Goal Flow Sheet for this information)     Communication ability:  Patient appears to be able to clearly communicate and understand verbal and written communication and follow directions correctly.  Treatment Explanation - The following has been discussed with the patient:   RX ordered/plan of care  Anticipated outcomes  Possible risks and side effects  This patient would benefit from PT intervention to resume normal activities.   Rehab potential is good.    Frequency:  1 X week, once daily  Duration:  for 6 weeks  Discharge Plan:  Achieve all LTG.  Independent in home treatment program.  Reach maximal therapeutic benefit.    Please refer to the daily flowsheet for treatment today, total treatment time and time spent performing 1:1 timed codes.

## 2018-12-20 ENCOUNTER — THERAPY VISIT (OUTPATIENT)
Dept: PHYSICAL THERAPY | Facility: CLINIC | Age: 53
End: 2018-12-20
Payer: COMMERCIAL

## 2018-12-20 DIAGNOSIS — M25.512 SHOULDER PAIN, LEFT: Primary | ICD-10-CM

## 2018-12-20 PROCEDURE — 97140 MANUAL THERAPY 1/> REGIONS: CPT | Mod: GP | Performed by: PHYSICAL THERAPIST

## 2018-12-20 PROCEDURE — 97112 NEUROMUSCULAR REEDUCATION: CPT | Mod: GP | Performed by: PHYSICAL THERAPIST

## 2018-12-20 PROCEDURE — 97110 THERAPEUTIC EXERCISES: CPT | Mod: GP | Performed by: PHYSICAL THERAPIST

## 2019-04-29 DIAGNOSIS — N52.9 ERECTILE DYSFUNCTION, UNSPECIFIED ERECTILE DYSFUNCTION TYPE: Chronic | ICD-10-CM

## 2019-04-29 NOTE — TELEPHONE ENCOUNTER
"Requested Prescriptions   Pending Prescriptions Disp Refills     tadalafil (CIALIS) 5 MG tablet [Pharmacy Med Name: Tadalafil Oral Tablet 5 MG] 30 tablet 4     Sig: TAKE 1 TABLET BY MOUTH ONE TIME DAILY       Last Written Prescription Date:  10/17/18  Last Fill Quantity: 30,  # refills: 5   Last office visit: 12/10/2018 with prescribing provider:     Future Office Visit:      Erectile Dysfuction Protocol Passed - 4/29/2019  8:40 AM        Passed - Absence of nitrates on medication list        Passed - Absence of Alpha Blockers on Med list        Passed - Recent (12 mo) or future (30 days) visit within the authorizing provider's specialty     Patient had office visit in the last 12 months or has a visit in the next 30 days with authorizing provider or within the authorizing provider's specialty.  See \"Patient Info\" tab in inbasket, or \"Choose Columns\" in Meds & Orders section of the refill encounter.              Passed - Medication is active on med list        Passed - Patient is age 18 or older          "

## 2019-04-30 RX ORDER — TADALAFIL 5 MG/1
TABLET ORAL
Qty: 30 TABLET | Refills: 4 | Status: SHIPPED | OUTPATIENT
Start: 2019-04-30 | End: 2019-10-03

## 2019-04-30 NOTE — TELEPHONE ENCOUNTER
Prescription approved per [facility] Refill Protocol.    Robin Rojas RN, BSN

## 2019-06-14 NOTE — PROGRESS NOTES
Patient did not return for follow up treatments as directed.  Goal status and current objective information is therefore unknown.  Discharge from PT services at this time for this episode of treatment. Please see attached documentation under this episode of care for further information including dates of service, start of care date, referring physician, Dx, treatment plan, treatments, etc.    Please contact me with any questions or concerns.    Thank you for your referral.    Britney Manjarrez, PT, DPT

## 2019-09-28 ENCOUNTER — HEALTH MAINTENANCE LETTER (OUTPATIENT)
Age: 54
End: 2019-09-28

## 2019-10-03 DIAGNOSIS — N52.9 ERECTILE DYSFUNCTION, UNSPECIFIED ERECTILE DYSFUNCTION TYPE: Chronic | ICD-10-CM

## 2019-10-04 NOTE — TELEPHONE ENCOUNTER
"Requested Prescriptions   Pending Prescriptions Disp Refills     tadalafil (CIALIS) 5 MG tablet [Pharmacy Med Name: Tadalafil Oral Tablet 5 MG]  Last Written Prescription Date:  4/30/19  Last Fill Quantity: 30 tablet,  # refills: 4   Last office visit: 12/10/2018 with prescribing provider:  Dr. Porter   Future Office Visit:     30 tablet 3     Sig: TAKE 1 TABLET BY MOUTH ONE TIME DAILY       Erectile Dysfuction Protocol Passed - 10/3/2019 11:08 PM        Passed - Absence of nitrates on medication list        Passed - Absence of Alpha Blockers on Med list        Passed - Recent (12 mo) or future (30 days) visit within the authorizing provider's specialty     Patient has had an office visit with the authorizing provider or a provider within the authorizing providers department within the previous 12 mos or has a future within next 30 days. See \"Patient Info\" tab in inbasket, or \"Choose Columns\" in Meds & Orders section of the refill encounter.              Passed - Medication is active on med list        Passed - Patient is age 18 or older          "

## 2019-10-07 RX ORDER — TADALAFIL 5 MG/1
TABLET ORAL
Qty: 30 TABLET | Refills: 1 | Status: SHIPPED | OUTPATIENT
Start: 2019-10-07 | End: 2019-12-11

## 2019-10-07 NOTE — TELEPHONE ENCOUNTER
Medication is being filled for 1 time refill only due to:  Patient needs to be seen because due for yearly office visit in December 2019.           Robin Rojas RN, BSN, PHN

## 2019-11-21 ENCOUNTER — MYC MEDICAL ADVICE (OUTPATIENT)
Dept: FAMILY MEDICINE | Facility: CLINIC | Age: 54
End: 2019-11-21

## 2019-12-07 DIAGNOSIS — N52.9 ERECTILE DYSFUNCTION, UNSPECIFIED ERECTILE DYSFUNCTION TYPE: Chronic | ICD-10-CM

## 2019-12-07 DIAGNOSIS — E78.5 HYPERLIPIDEMIA LDL GOAL <130: Chronic | ICD-10-CM

## 2019-12-09 NOTE — TELEPHONE ENCOUNTER
"Requested Prescriptions   Pending Prescriptions Disp Refills     tadalafil (CIALIS) 5 MG tablet [Pharmacy Med Name: Tadalafil Oral Tablet 5 MG] 30 tablet 0     Sig: TAKE 1 TABLET BY MOUTH ONE TIME DAILY       Erectile Dysfuction Protocol Passed - 12/7/2019  7:11 PM        Passed - Absence of nitrates on medication list        Passed - Absence of Alpha Blockers on Med list        Passed - Recent (12 mo) or future (30 days) visit within the authorizing provider's specialty     Patient has had an office visit with the authorizing provider or a provider within the authorizing providers department within the previous 12 mos or has a future within next 30 days. See \"Patient Info\" tab in inbasket, or \"Choose Columns\" in Meds & Orders section of the refill encounter.              Passed - Medication is active on med list        Passed - Patient is age 18 or older        atorvastatin (LIPITOR) 80 MG tablet [Pharmacy Med Name: Atorvastatin Calcium Oral Tablet 80 MG] 90 tablet 2     Sig: TAKE 1 TABLET BY MOUTH ONE TIME DAILY       Statins Protocol Passed - 12/7/2019  7:11 PM        Passed - LDL on file in past 12 months     Recent Labs   Lab Test 12/10/18  0937   LDL 81             Passed - No abnormal creatine kinase in past 12 months     No lab results found.             Passed - Recent (12 mo) or future (30 days) visit within the authorizing provider's specialty     Patient has had an office visit with the authorizing provider or a provider within the authorizing providers department within the previous 12 mos or has a future within next 30 days. See \"Patient Info\" tab in inbasket, or \"Choose Columns\" in Meds & Orders section of the refill encounter.              Passed - Medication is active on med list        Passed - Patient is age 18 or older        tadalafil (CIALIS) 5 MG tablet  Last Written Prescription Date:  10/7/19  Last Fill Quantity: 30,  # refills: 1   Last office visit: 12/10/2018 with prescribing provider:  " Dr. Porter   Future Office Visit:   Next 5 appointments (look out 90 days)    Dec 11, 2019  9:20 AM CST  PHYSICAL with Kelli Porter MD  [facility] [address] [place] [id number]  [phone]           atorvastatin (LIPITOR) 80 MG tablet   Last Written Prescription Date:  12/10/18  Last Fill Quantity: 90,  # refills: 3   Last office visit: 12/10/2018 with prescribing provider:  Dr. Porter   Future Office Visit:   Next 5 appointments (look out 90 days)    Dec 11, 2019  9:20 AM CST  PHYSICAL with Kelli Porter MD  [facility] [address] [facility] [id number]  [phone]

## 2019-12-10 NOTE — TELEPHONE ENCOUNTER
Patient has appointment tomorrow. Please review request at appointment.     Payton Maguire RN  [facility]

## 2019-12-11 ENCOUNTER — OFFICE VISIT (OUTPATIENT)
Dept: FAMILY MEDICINE | Facility: CLINIC | Age: 54
End: 2019-12-11
Payer: COMMERCIAL

## 2019-12-11 VITALS
HEART RATE: 84 BPM | DIASTOLIC BLOOD PRESSURE: 83 MMHG | WEIGHT: 220 LBS | BODY MASS INDEX: 32.58 KG/M2 | TEMPERATURE: 97.9 F | SYSTOLIC BLOOD PRESSURE: 118 MMHG | HEIGHT: 69 IN | OXYGEN SATURATION: 92 %

## 2019-12-11 DIAGNOSIS — E78.5 HYPERLIPIDEMIA LDL GOAL <130: Chronic | ICD-10-CM

## 2019-12-11 DIAGNOSIS — J45.40 MODERATE PERSISTENT ASTHMA WITHOUT COMPLICATION: Chronic | ICD-10-CM

## 2019-12-11 DIAGNOSIS — Z00.00 ROUTINE GENERAL MEDICAL EXAMINATION AT A HEALTH CARE FACILITY: Primary | ICD-10-CM

## 2019-12-11 DIAGNOSIS — G47.33 OSA (OBSTRUCTIVE SLEEP APNEA): ICD-10-CM

## 2019-12-11 DIAGNOSIS — N52.9 ERECTILE DYSFUNCTION, UNSPECIFIED ERECTILE DYSFUNCTION TYPE: Chronic | ICD-10-CM

## 2019-12-11 LAB
ALBUMIN SERPL-MCNC: 4.2 G/DL (ref 3.4–5)
ALP SERPL-CCNC: 117 U/L (ref 40–150)
ALT SERPL W P-5'-P-CCNC: 48 U/L (ref 0–70)
ANION GAP SERPL CALCULATED.3IONS-SCNC: 9 MMOL/L (ref 3–14)
AST SERPL W P-5'-P-CCNC: 17 U/L (ref 0–45)
BILIRUB SERPL-MCNC: 0.3 MG/DL (ref 0.2–1.3)
BUN SERPL-MCNC: 14 MG/DL (ref 7–30)
CALCIUM SERPL-MCNC: 9.4 MG/DL (ref 8.5–10.1)
CHLORIDE SERPL-SCNC: 107 MMOL/L (ref 94–109)
CHOLEST SERPL-MCNC: 181 MG/DL
CO2 SERPL-SCNC: 24 MMOL/L (ref 20–32)
CREAT SERPL-MCNC: 1.08 MG/DL (ref 0.66–1.25)
ERYTHROCYTE [DISTWIDTH] IN BLOOD BY AUTOMATED COUNT: 12.9 % (ref 10–15)
GFR SERPL CREATININE-BSD FRML MDRD: 78 ML/MIN/{1.73_M2}
GLUCOSE SERPL-MCNC: 113 MG/DL (ref 70–99)
HCT VFR BLD AUTO: 43.3 % (ref 40–53)
HDLC SERPL-MCNC: 52 MG/DL
HGB BLD-MCNC: 14.6 G/DL (ref 13.3–17.7)
LDLC SERPL CALC-MCNC: 79 MG/DL
MCH RBC QN AUTO: 31.5 PG (ref 26.5–33)
MCHC RBC AUTO-ENTMCNC: 33.7 G/DL (ref 31.5–36.5)
MCV RBC AUTO: 93 FL (ref 78–100)
NONHDLC SERPL-MCNC: 129 MG/DL
PLATELET # BLD AUTO: 338 10E9/L (ref 150–450)
POTASSIUM SERPL-SCNC: 4 MMOL/L (ref 3.4–5.3)
PROT SERPL-MCNC: 7.6 G/DL (ref 6.8–8.8)
PSA SERPL-ACNC: 2.06 UG/L (ref 0–4)
RBC # BLD AUTO: 4.64 10E12/L (ref 4.4–5.9)
SODIUM SERPL-SCNC: 140 MMOL/L (ref 133–144)
TRIGL SERPL-MCNC: 249 MG/DL
TSH SERPL DL<=0.005 MIU/L-ACNC: 2.2 MU/L (ref 0.4–4)
WBC # BLD AUTO: 7.9 10E9/L (ref 4–11)

## 2019-12-11 PROCEDURE — 85027 COMPLETE CBC AUTOMATED: CPT | Performed by: FAMILY MEDICINE

## 2019-12-11 PROCEDURE — G0103 PSA SCREENING: HCPCS | Performed by: FAMILY MEDICINE

## 2019-12-11 PROCEDURE — 80053 COMPREHEN METABOLIC PANEL: CPT | Performed by: FAMILY MEDICINE

## 2019-12-11 PROCEDURE — 36415 COLL VENOUS BLD VENIPUNCTURE: CPT | Performed by: FAMILY MEDICINE

## 2019-12-11 PROCEDURE — 84443 ASSAY THYROID STIM HORMONE: CPT | Performed by: FAMILY MEDICINE

## 2019-12-11 PROCEDURE — 99396 PREV VISIT EST AGE 40-64: CPT | Performed by: FAMILY MEDICINE

## 2019-12-11 PROCEDURE — 80061 LIPID PANEL: CPT | Performed by: FAMILY MEDICINE

## 2019-12-11 RX ORDER — MONTELUKAST SODIUM 10 MG/1
1 TABLET ORAL AT BEDTIME
Qty: 90 TABLET | Refills: 3 | Status: SHIPPED | OUTPATIENT
Start: 2019-12-11 | End: 2020-12-11

## 2019-12-11 RX ORDER — FAMOTIDINE 10 MG
10 TABLET ORAL 2 TIMES DAILY
COMMUNITY
End: 2021-06-23

## 2019-12-11 RX ORDER — ATORVASTATIN CALCIUM 80 MG/1
TABLET, FILM COATED ORAL
Qty: 90 TABLET | Refills: 2 | OUTPATIENT
Start: 2019-12-11

## 2019-12-11 RX ORDER — TADALAFIL 5 MG/1
TABLET ORAL
Qty: 30 TABLET | Refills: 0 | OUTPATIENT
Start: 2019-12-11

## 2019-12-11 RX ORDER — ATORVASTATIN CALCIUM 80 MG/1
80 TABLET, FILM COATED ORAL DAILY
Qty: 90 TABLET | Refills: 3 | Status: SHIPPED | OUTPATIENT
Start: 2019-12-11 | End: 2020-12-11

## 2019-12-11 RX ORDER — TADALAFIL 5 MG/1
TABLET ORAL
Qty: 90 TABLET | Refills: 3 | Status: SHIPPED | OUTPATIENT
Start: 2019-12-11 | End: 2020-11-18

## 2019-12-11 ASSESSMENT — MIFFLIN-ST. JEOR: SCORE: 1825.41

## 2019-12-11 NOTE — PROGRESS NOTES
[facility]    3  SUBJECTIVE:   CC: Juan Treadwell is an 54 year old male who presents for preventive health visit.     Healthy Habits:    Do you get at least three servings of calcium containing foods daily (dairy, green leafy vegetables, etc.)? yes    Amount of exercise or daily activities, outside of work: 3-4 day(s) per week    Problems taking medications regularly No    Medication side effects: No    Have you had an eye exam in the past two years? yes    Do you see a dentist twice per year? yes    Do you have sleep apnea, excessive snoring or daytime drowsiness? Yes- patient would like to discuss other sleep apnea options           Today's PHQ-2 Score:   PHQ-2 ( 1999 Pfizer) 12/11/2019 12/10/2018   Q1: Little interest or pleasure in doing things 0 0   Q2: Feeling down, depressed or hopeless 0 0   PHQ-2 Score 0 0   Q1: Little interest or pleasure in doing things - -   Q2: Feeling down, depressed or hopeless - -   PHQ-2 Score - -       Abuse: Current or Past(Physical, Sexual or Emotional)- No  Do you feel safe in your environment? Yes        Social History     Tobacco Use     Smoking status: Never Smoker     Smokeless tobacco: Never Used     Tobacco comment: no 2nd hand smoke exposure   Substance Use Topics     Alcohol use: Yes     Alcohol/week: 3.0 - 4.0 standard drinks     Types: 3 - 4 Standard drinks or equivalent per week     Comment: 3-4 drinks weekly     If you drink alcohol do you typically have >3 drinks per day or >7 drinks per week? No                      Last PSA:   PSA   Date Value Ref Range Status   12/10/2018 1.91 0 - 4 ug/L Final     Comment:     Assay Method:  Chemiluminescence using Siemens Vista analyzer       Reviewed orders with patient. Reviewed health maintenance and updated orders accordingly - Yes  Lab work is in process  Labs reviewed in EPIC  BP Readings from Last 3 Encounters:   12/11/19 118/83   12/10/18 122/78   12/08/17 110/80    Wt Readings from Last 3 Encounters:   12/11/19 99.8 kg (220  "lb)   12/10/18 95.3 kg (210 lb)   12/08/17 94 kg (207 lb 3.2 oz)                    Reviewed and updated as needed this visit by clinical staff  Tobacco  Allergies  Meds         Reviewed and updated as needed this visit by Provider        Here today for routine checkup and follow-up on chronic issues.  Generally doing well overall but having more daytime fatigue despite using his CPAP machine.  Wonders about speaking with someone about [employer]     ROS:  CONSTITUTIONAL: NEGATIVE for fever, chills, change in weight  INTEGUMENTARY/SKIN: NEGATIVE for worrisome rashes, moles or lesions  EYES: NEGATIVE for vision changes or irritation  ENT: NEGATIVE for ear, mouth and throat problems  RESP: NEGATIVE for significant cough or SOB  CV: NEGATIVE for chest pain, palpitations or peripheral edema  GI: NEGATIVE for nausea, abdominal pain, heartburn, or change in bowel habits   male: negative for dysuria, hematuria, decreased urinary stream, erectile dysfunction, urethral discharge  MUSCULOSKELETAL: NEGATIVE for significant arthralgias or myalgia  NEURO: NEGATIVE for weakness, dizziness or paresthesias  PSYCHIATRIC: NEGATIVE for changes in mood or affect    OBJECTIVE:   /83 (BP Location: Right arm, Patient Position: Chair, Cuff Size: Adult Large)   Pulse 84   Temp 97.9  F (36.6  C) (Oral)   Ht 1.74 m (5' 8.5\")   Wt 99.8 kg (220 lb)   SpO2 92%   BMI 32.96 kg/m    EXAM:  GENERAL: healthy, alert and no distress  EYES: Eyes grossly normal to inspection, PERRL and conjunctivae and sclerae normal  HENT: ear canals and TM's normal, nose and mouth without ulcers or lesions  NECK: no adenopathy, no asymmetry, masses, or scars and thyroid normal to palpation  RESP: lungs clear to auscultation - no rales, rhonchi or wheezes  CV: regular rate and rhythm, normal S1 S2, no S3 or S4, no murmur, click or rub, no peripheral edema and peripheral pulses strong  ABDOMEN: soft, nontender, no hepatosplenomegaly, no masses and bowel " "sounds normal  MS: no gross musculoskeletal defects noted, no edema  SKIN: no suspicious lesions or rashes  NEURO: Normal strength and tone, mentation intact and speech normal  PSYCH: mentation appears normal, affect normal/bright    Diagnostic Test Results:  Labs reviewed in Epic    ASSESSMENT/PLAN:   1. Routine general medical examination at a health care facility  Routine health maintenance up-to-date otherwise  - Lipid panel reflex to direct LDL Fasting  - Prostate spec antigen screen    2. Hyperlipidemia LDL goal <130    - atorvastatin (LIPITOR) 80 MG tablet; Take 1 tablet (80 mg) by mouth daily  Dispense: 90 tablet; Refill: 3  - Comprehensive metabolic panel    3. Moderate persistent asthma without complication    - montelukast (SINGULAIR) 10 MG tablet; Take 1 tablet (10 mg) by mouth At Bedtime  Dispense: 90 tablet; Refill: 3    4. Erectile dysfunction, unspecified erectile dysfunction type    - tadalafil (CIALIS) 5 MG tablet; TAKE 1 TABLET BY MOUTH ONE TIME DAILY  Dispense: 90 tablet; Refill: 3    5. DIAMOND (obstructive sleep apnea)- moderate (AHI 17)    - SLEEP ENT REFERRAL    COUNSELING:  Reviewed preventive health counseling, as reflected in patient instructions       Regular exercise       Healthy diet/nutrition       Vision screening       Colon cancer screening       Prostate cancer screening    Estimated body mass index is 32.96 kg/m  as calculated from the following:    Height as of this encounter: 1.74 m (5' 8.5\").    Weight as of this encounter: 99.8 kg (220 lb).    Weight management plan: Discussed healthy diet and exercise guidelines     reports that he has never smoked. He has never used smokeless tobacco.      Counseling Resources:  ATP IV Guidelines  Pooled Cohorts Equation Calculator  FRAX Risk Assessment  ICSI Preventive Guidelines  Dietary Guidelines for Americans, 2010  USDA's MyPlate  ASA Prophylaxis  Lung CA Screening    Kelli Porter MD  [facility]"

## 2019-12-11 NOTE — LETTER
My Asthma Action Plan    Name: Juan Treadwell   YOB: 1965  Date: 12/11/2019   My doctor: Kelli Porter MD   My clinic: [facility]        My Control Medicine: Montelukast (Singulair) -  10 mg daily  My Rescue Medicine: Albuterol (Proair/Ventolin/Proventil HFA) 2-4 puffs EVERY 4 HOURS as needed. Use a spacer if recommended by your provider.   My Asthma Severity:   Mild Persistent  Know your asthma triggers: upper respiratory infections               GREEN ZONE   Good Control    I feel good    No cough or wheeze    Can work, sleep and play without asthma symptoms       Take your asthma control medicine every day.     1. If exercise triggers your asthma, take your rescue medication    15 minutes before exercise or sports, and    During exercise if you have asthma symptoms  2. Spacer to use with inhaler: If you have a spacer, make sure to use it with your inhaler             YELLOW ZONE Getting Worse  I have ANY of these:    I do not feel good    Cough or wheeze    Chest feels tight    Wake up at night   1. Keep taking your Green Zone medications  2. Start taking your rescue medicine:    every 20 minutes for up to 1 hour. Then every 4 hours for 24-48 hours.  3. If you stay in the Yellow Zone for more than 12-24 hours, contact your doctor.  4. If you do not return to the Green Zone in 12-24 hours or you get worse, start taking your oral steroid medicine if prescribed by your provider.           RED ZONE Medical Alert - Get Help  I have ANY of these:    I feel awful    Medicine is not helping    Breathing getting harder    Trouble walking or talking    Nose opens wide to breathe       1. Take your rescue medicine NOW  2. If your provider has prescribed an oral steroid medicine, start taking it NOW  3. Call your doctor NOW  4. If you are still in the Red Zone after 20 minutes and you have not reached your doctor:    Take your rescue medicine again and    Call 911 or go to the  emergency room right away    See your regular doctor within 2 weeks of an Emergency Room or Urgent Care visit for follow-up treatment.          Annual Reminders:  Meet with Asthma Educator,  Flu Shot in the Fall, consider Pneumonia Vaccination for patients with asthma (aged 19 and older).    Pharmacy:    [facility] - [place], [place] - [address] [facility] [place], [place] [id number] - [place], [place] - [address]  [id number] - [place], [place] - [address] [facility]    Electronically signed by Kelli Porter MD   Date: 12/11/19                      Asthma Triggers  How To Control Things That Make Your Asthma Worse    Triggers are things that make your asthma worse.  Look at the list below to help you find your triggers and what you can do about them.  You can help prevent asthma flare-ups by staying away from your triggers.      Trigger                                                          What you can do   Cigarette Smoke  Tobacco smoke can make asthma worse. Do not allow smoking in your home, car or around you.  Be sure no one smokes at a child s day care or school.  If you smoke, ask your health care provider for ways to help you quit.  Ask family members to quit too.  Ask your health care provider for a referral to Quit Plan to help you quit smoking, or call 0-743-488-PLAN.     Colds, Flu, Bronchitis  These are common triggers of asthma. Wash your hands often.  Don t touch your eyes, nose or mouth.  Get a flu shot every year.     Dust Mites  These are tiny bugs that live in cloth or carpet. They are too small to see. Wash sheets and blankets in hot water every week.   Encase pillows and mattress in dust mite proof covers.  Avoid having carpet if you can. If you have carpet, vacuum weekly.   Use a dust mask and HEPA vacuum.   Pollen and Outdoor Mold  Some people are allergic to  trees, grass, or weed pollen, or molds. Try to keep your windows closed.  Limit time out doors when pollen count is high.   Ask you health care provider about taking medicine during allergy season.     Animal Dander  Some people are allergic to skin flakes, urine or saliva from pets with fur or feathers. Keep pets with fur or feathers out of your home.    If you can t keep the pet outdoors, then keep the pet out of your bedroom.  Keep the bedroom door closed.  Keep pets off cloth furniture and away from stuffed toys.     Mice, Rats, and Cockroaches   Some people are allergic to the waste from these pests.   Cover food and garbage.  Clean up spills and food crumbs.  Store grease in the refrigerator.   Keep food out of the bedroom.   Indoor Mold  This can be a trigger if your home has high moisture. Fix leaking faucets, pipes, or other sources of water.   Clean moldy surfaces.  Dehumidify basement if it is damp and smelly.   Smoke, Strong Odors, and Sprays  These can reduce air quality. Stay away from strong odors and sprays, such as perfume, powder, hair spray, paints, smoke incense, paint, cleaning products, candles and new carpet.   Exercise or Sports  Some people with asthma have this trigger. Be active!  Ask your doctor about taking medicine before sports or exercise to prevent symptoms.    Warm up for 5-10 minutes before and after sports or exercise.     Other Triggers of Asthma  Cold air:  Cover your nose and mouth with a scarf.  Sometimes laughing or crying can be a trigger.  Some medicines and food can trigger asthma.

## 2020-02-28 ENCOUNTER — OFFICE VISIT (OUTPATIENT)
Dept: FAMILY MEDICINE | Facility: CLINIC | Age: 55
End: 2020-02-28
Payer: COMMERCIAL

## 2020-02-28 VITALS
OXYGEN SATURATION: 95 % | BODY MASS INDEX: 34.29 KG/M2 | WEIGHT: 231.5 LBS | HEART RATE: 84 BPM | HEIGHT: 69 IN | DIASTOLIC BLOOD PRESSURE: 70 MMHG | SYSTOLIC BLOOD PRESSURE: 108 MMHG | RESPIRATION RATE: 18 BRPM | TEMPERATURE: 98.4 F

## 2020-02-28 DIAGNOSIS — L73.9 FOLLICULITIS: ICD-10-CM

## 2020-02-28 DIAGNOSIS — B02.9 HERPES ZOSTER WITHOUT COMPLICATION: Primary | ICD-10-CM

## 2020-02-28 PROCEDURE — 99213 OFFICE O/P EST LOW 20 MIN: CPT | Performed by: INTERNAL MEDICINE

## 2020-02-28 RX ORDER — MUPIROCIN CALCIUM 20 MG/G
CREAM TOPICAL 3 TIMES DAILY
Qty: 15 G | Refills: 0 | Status: SHIPPED | OUTPATIENT
Start: 2020-02-28 | End: 2020-12-11

## 2020-02-28 RX ORDER — VALACYCLOVIR HYDROCHLORIDE 1 G/1
1000 TABLET, FILM COATED ORAL 3 TIMES DAILY
Qty: 21 TABLET | Refills: 0 | Status: SHIPPED | OUTPATIENT
Start: 2020-02-28 | End: 2020-09-17

## 2020-02-28 ASSESSMENT — PAIN SCALES - GENERAL: PAINLEVEL: NO PAIN (0)

## 2020-02-28 ASSESSMENT — MIFFLIN-ST. JEOR: SCORE: 1877.52

## 2020-02-28 NOTE — PATIENT INSTRUCTIONS
Patient Education     Shingles  Shingles is a viral infection caused by the same virus that causes chicken pox. Anyone who has had chicken pox may get shingles later in life. The virus stays in the body, but remains asleep (dormant). Shingles often occurs in older persons or persons with lowered immunity. But it can affect anyone at any age.  Shingles starts as a tingling patch of skin on one side of the body. Small, painful blisters may then appear. The rash rarely spreads to other parts of the body.  Exposure to shingles can't cause shingles. However, it can cause chicken pox in anyone who has not had chicken pox or has not been vaccinated. The contagious period ends when all blisters have crusted over, generally 1 to 2 weeks after the illness starts.  After the blisters heal, the affected skin may be sensitive or painful for weeks or months, gradually resolving over time. But, sometimes this can last longer and be permanent (called postherpetic neuralgia.)  Shingles vaccines are available. Vaccination can help prevent shingles or make it less painful. It is generally recommended for adults older than 50, even if you've had singles in the past. Talk with your healthcare provider about when to get vaccinated and which vaccine is best for you.  Home care    Medicines may be prescribed to help relieve pain. Take these medicines as directed. Ask your healthcare provider or pharmacist before using over-the-counter medicines for helping treat pain and itching.    In certain cases, antiviral medicines may be prescribed to reduce pain, shorten the illness, and prevent neuralgia. Take these medicines as directed.    Compresses made from a solution of cool water mixed with cornstarch or baking soda may help relieve pain and itching.     Gently wash skin daily with soap and water to help prevent infection. Be certain to rinse off all of the soap, which can be irritating.    Trim fingernails and try not to scratch.  Scratching the sores may leave scars.    Stay home from work or school until all blisters have formed a crust and you are no longer contagious.  Follow-up care  Follow up with your healthcare provider, or as directed.  When to seek medical advice    Fever of 100.4 F (38 C) or higher, or as directed by your healthcare provider    Affected skin is on the face or neck and any of the following occur:  ? Headache  ? Eye pain  ? Changes in vision  ? Sores near the eye  ? Weakness of facial muscles    Blisters occurring on new areas of the body    Pain, redness, or swelling of a joint    Signs of skin infection: colored drainage from the sores, warmth, increasing redness, fever, or increasing pain  Date Last Reviewed: 4/1/2018 2000-2019 [employer]. [address] [place] [address], [place] [address]. All rights reserved. This information is not intended as a substitute for professional medical care. Always follow your healthcare professional's instructions.

## 2020-02-28 NOTE — PROGRESS NOTES
Subjective   Presented today to the clinic because he is worried that he might be coming up with shingles  Noticed a erythematous area in the philtrum  He has some shooting sensation from that  Around 11 years ago he had similar initial lesion which later led to more erythematous lesions and shingles on the left side of the face  He is concerned that that is what is happening now  He had recently lost stress  Juan Treadwell is a 54 year old male who presents to clinic today for the following health issues:    HPI   Rash  Onset: a couple days ago    Description:   Location: on face  Character: raised, red, painful  Itching (Pruritis): no     Progression of Symptoms:  worsening    Accompanying Signs & Symptoms:  Fever: no   Body aches or joint pain: no   Sore throat symptoms: no   Recent cold symptoms: yes    History:   Previous similar rash: no     Precipitating factors:   Exposure to similar rash: no   New exposures: None   Recent travel: no     Alleviating factors:nothing      Therapies Tried and outcome: nothing      Patient Active Problem List   Diagnosis     DIAMOND (obstructive sleep apnea)- moderate (AHI 17)     Hyperlipidemia LDL goal <130     Allergic rhinitis, unspecified allergic rhinitis type     Male hypogonadism     Erectile dysfunction, unspecified erectile dysfunction type     Gastroesophageal reflux disease without esophagitis     Restless legs syndrome (RLS)     Moderate persistent asthma without complication     Past Surgical History:   Procedure Laterality Date     COLONOSCOPY N/A 12/16/2014    Procedure: COLONOSCOPY;  Surgeon: Raz Ortiz MD;  Location:  OR     EXAM UNDER ANESTHESIA, FISTULOTOMY RECTUM, COMBINED N/A 12/16/2014    Procedure: COMBINED EXAM UNDER ANESTHESIA, FISTULOTOMY RECTUM;  Surgeon: Raz Ortiz MD;  Location:  OR     [facility]         Social History     Tobacco Use     Smoking status: Never Smoker     Smokeless tobacco: Never  "Used     Tobacco comment: no 2nd hand smoke exposure   Substance Use Topics     Alcohol use: Yes     Alcohol/week: 3.0 - 4.0 standard drinks     Types: 3 - 4 Standard drinks or equivalent per week     Comment: 3-4 drinks weekly     Family History   Problem Relation Age of Onset     Crohn's Disease Brother      Prostate Cancer Father      Diabetes Father 80     Breast Cancer Sister      Anesthesia Reaction No family hx of      Colon Polyps No family hx of      Ulcerative Colitis No family hx of      Cancer - colorectal No family hx of      Coronary Artery Disease No family hx of              Reviewed and updated as needed this visit by Provider         Review of Systems   ROS COMP: Constitutional, HEENT, cardiovascular, pulmonary, gi and gu systems are negative, except as otherwise noted.      Objective    /70 (BP Location: Right arm, Patient Position: Sitting, Cuff Size: Adult Large)   Pulse 84   Temp 98.4  F (36.9  C) (Oral)   Resp 18   Ht 1.74 m (5' 8.5\")   Wt 105 kg (231 lb 8 oz)   SpO2 95%   BMI 34.69 kg/m    Body mass index is 34.69 kg/m .  Physical Exam  Vitals signs reviewed.   HENT:      Head: Normocephalic and atraumatic.      Comments: He has a erythematous maculopapular lesion at the philtrum of the nose  There is some vague erythematous areas on the left lower cheek  Eyes:      Conjunctiva/sclera: Conjunctivae normal.      Pupils: Pupils are equal, round, and reactive to light.   Neck:      Musculoskeletal: Normal range of motion and neck supple.   Cardiovascular:      Rate and Rhythm: Normal rate and regular rhythm.   Pulmonary:      Effort: Pulmonary effort is normal.   Skin:     General: Skin is warm.   Neurological:      General: No focal deficit present.      Mental Status: He is alert and oriented to person, place, and time.   Psychiatric:         Mood and Affect: Mood normal.         Behavior: Behavior normal.            No results found for this or any previous visit (from the past " 24 hour(s)).        Assessment & Plan     1. Herpes zoster without complication  Patient states that the last time he had herpes zoster he had similar initial symptoms followed by florid lesions  He has shooting pain currently  He is worried that last time he had considerable pain and debility from the zoster so would like to start treatment straightaway  At this point it is more 100% clear that we are dealing with zoster here however given the fact that he is the best  for his symptoms we will treat this as shingles due to an abundance of caution  - valACYclovir (VALTREX) 1000 mg tablet; Take 1 tablet (1,000 mg) by mouth 3 times daily for 7 days  Dispense: 21 tablet; Refill: 0    2. Folliculitis  There seem to be some folliculitis as well so we will try mupirocin  - mupirocin (BACTROBAN) 2 % external cream; Apply topically 3 times daily  Dispense: 15 g; Refill: 0           Return in about 4 weeks (around 3/27/2020).    Jorge Luis Leiva MD  [facility]

## 2020-03-20 ENCOUNTER — MYC MEDICAL ADVICE (OUTPATIENT)
Dept: FAMILY MEDICINE | Facility: CLINIC | Age: 55
End: 2020-03-20

## 2020-04-13 ENCOUNTER — E-VISIT (OUTPATIENT)
Dept: FAMILY MEDICINE | Facility: CLINIC | Age: 55
End: 2020-04-13
Payer: COMMERCIAL

## 2020-04-13 DIAGNOSIS — K21.9 GASTROESOPHAGEAL REFLUX DISEASE WITHOUT ESOPHAGITIS: Primary | ICD-10-CM

## 2020-04-13 DIAGNOSIS — J45.40 MODERATE PERSISTENT ASTHMA WITHOUT COMPLICATION: Chronic | ICD-10-CM

## 2020-04-13 DIAGNOSIS — G25.81 RESTLESS LEGS SYNDROME (RLS): ICD-10-CM

## 2020-04-13 PROCEDURE — 99421 OL DIG E/M SVC 5-10 MIN: CPT | Performed by: FAMILY MEDICINE

## 2020-04-14 RX ORDER — ROPINIROLE 0.5 MG/1
TABLET, FILM COATED ORAL
Qty: 30 TABLET | Refills: 0 | Status: SHIPPED | OUTPATIENT
Start: 2020-04-14 | End: 2020-05-13

## 2020-04-14 RX ORDER — ALBUTEROL SULFATE 90 UG/1
2 AEROSOL, METERED RESPIRATORY (INHALATION) EVERY 4 HOURS PRN
Qty: 1 INHALER | Refills: 2 | Status: SHIPPED | OUTPATIENT
Start: 2020-04-14 | End: 2024-08-13

## 2020-04-14 RX ORDER — FAMOTIDINE 40 MG/1
40 TABLET, FILM COATED ORAL DAILY
Qty: 30 TABLET | Refills: 2 | Status: SHIPPED | OUTPATIENT
Start: 2020-04-14 | End: 2020-10-29

## 2020-05-03 ENCOUNTER — TRANSFERRED RECORDS (OUTPATIENT)
Dept: HEALTH INFORMATION MANAGEMENT | Facility: CLINIC | Age: 55
End: 2020-05-03

## 2020-06-09 DIAGNOSIS — G25.81 RESTLESS LEGS SYNDROME (RLS): ICD-10-CM

## 2020-06-10 RX ORDER — ROPINIROLE 0.5 MG/1
TABLET, FILM COATED ORAL
Qty: 30 TABLET | Refills: 5 | Status: SHIPPED | OUTPATIENT
Start: 2020-06-10 | End: 2020-12-04

## 2020-06-10 NOTE — TELEPHONE ENCOUNTER
"Requested Prescriptions   Pending Prescriptions Disp Refills     rOPINIRole (REQUIP) 0.5 MG tablet [Pharmacy Med Name: rOPINIRole HCl Oral Tablet 0.5 MG] 30 tablet 0     Sig: TAKE ONE TABLET BY MOUTH AT BEDTIME       Antiparkinson's Agents Protocol Passed - 6/10/2020  3:17 PM        Passed - Blood pressure under 140/90 in past 12 months     BP Readings from Last 3 Encounters:   02/28/20 108/70   12/11/19 118/83   12/10/18 122/78                 Passed - CBC on record in past 12 months     Recent Labs   Lab Test 12/11/19  0947   WBC 7.9   RBC 4.64   HGB 14.6   HCT 43.3                    Passed - ALT on record in past 12 months         Recent Labs   Lab Test 12/11/19  0947   ALT 48             Passed - Serum Creatinine on file in past 12 months     Recent Labs   Lab Test 12/11/19  0947   CR 1.08       Ok to refill medication if creatinine is low          Passed - Medication is active on med list        Passed - Patient is age 18 or older        Passed - Recent (6 mo) or future (30 days) visit within the authorizing provider's specialty     Patient had office visit in the last 6 months or has a visit in the next 30 days with authorizing provider or within the authorizing provider's specialty.  See \"Patient Info\" tab in inbasket, or \"Choose Columns\" in Meds & Orders section of the refill encounter.               Prescription approved per [facility] Refill Protocol.      Robin Rojas RN, BSN, PHN    "

## 2020-09-08 ENCOUNTER — TELEPHONE (OUTPATIENT)
Dept: SLEEP MEDICINE | Facility: CLINIC | Age: 55
End: 2020-09-08

## 2020-09-08 NOTE — TELEPHONE ENCOUNTER
Pt needs follow-up appt for cpap & supplies. I clld pt to schedule appt, unable to leave a msg. VM not set up yet

## 2020-09-17 VITALS — BODY MASS INDEX: 29.35 KG/M2 | HEIGHT: 70 IN | WEIGHT: 205 LBS

## 2020-09-17 ASSESSMENT — MIFFLIN-ST. JEOR: SCORE: 1776.12

## 2020-09-17 NOTE — PROGRESS NOTES
"Juan Treadwell is a 55 year old male who is being evaluated via a billable video visit.      The patient has been notified of following:     \"This video visit will be conducted via a call between you and your physician/provider. We have found that certain health care needs can be provided without the need for an in-person physical exam.  This service lets us provide the care you need with a video conversation.  If a prescription is necessary we can send it directly to your pharmacy.  If lab work is needed we can place an order for that and you can then stop by our lab to have the test done at a later time.    Video visits are billed at different rates depending on your insurance coverage.  Please reach out to your insurance provider with any questions.    If during the course of the call the physician/provider feels a video visit is not appropriate, you will not be charged for this service.\"    Patient has given verbal consent for Video visit? Yes  How would you like to obtain your AVS? MyChart  If you are dropped from the video visit, the video invite should be resent to: Text to cell phone: [phone]  Will anyone else be joining your video visit? No      Rocio Apodaca MA on 9/17/2020 at 10:18 AM    Video-Visit Details    Type of service:  Video Visit    Video Start Time: 8:29 AM  Video End Time: 8:47 AM    Originating Location (pt. Location): Home    Distant Location (provider location):  [facility]     Platform used for Video Visit: [facility]         Obstructive Sleep Apnea - PAP Follow-Up Visit:    Chief Complaint   Patient presents with     CPAP Follow Up       Juan Treadwell for follow-up of their moderate sleep apnea, managed with CPAP.     He initially presented to [facility] in 2011 with snoring. He denies daytime symptoms. He was diagnosed with moderate obstructive sleep apnea and was started on CPAP.     Sleep Study 10/9/2011 [facility] (202#)- AHI 17, worse supine, RDI 30, low O2 86%, PLMI " 45  Sleep Study 12/5/2011 [facility] (202#)- CPAP 10 cm effective    He had follow-up with Dr. Juarez in 2011.     He presented to [facility] in 2019 for continuing care with break-through snoring, sleep maintenance difficulties, nocturnal reflux, excessive daytime sleepiness (ESS 12), machine malfunction. Recommended new machine with autoPAP 10-14 cm.       Overall, he rates the experience with PAP as good.    His PAP interface is nasal mask.    Bedtime is typically 12. Wake time is typically 630 or earlier during the week.  Total score - [place]: 11 (9/15/2020  9:24 AM)    He still feels tired.   He states he slept more, up to 8 hours for a period and did not feel better    He sometimes does not put PAP on after bathroom breaks 3-4 times a week.     ResMed   Auto-PAP 10.0 - 14.0 cmH2O 30 day usage data:    80% of days with > 4 hours of use. 2/30 days with no use.   Average use 294 minutes per day.   95%ile Leak 31.74 L/min.   CPAP 95% pressure 11.6 cm.   AHI 1.24 events per hour.         Past medical/surgical history, family history, social history, medications and allergies were reviewed.      Problem List:  Patient Active Problem List    Diagnosis Date Noted     Moderate persistent asthma without complication 01/14/2016     Priority: Medium     DIAMOND (obstructive sleep apnea)- moderate (AHI 17) 11/06/2015     Priority: Medium     Sleep Study 10/9/2011 [facility] (202#)- AHI 17, worse supine, RDI 30, low O2 86%, PLMI 45  Sleep Study 12/5/2011 [facility] (202#)- CPAP 10 cm effective       Hyperlipidemia LDL goal <130 11/06/2015     Priority: Medium     Restless legs syndrome (RLS) 12/02/2015     Priority: Low     Gastroesophageal reflux disease without esophagitis      Priority: Low     Erectile dysfunction, unspecified erectile dysfunction type 11/16/2015     Priority: Low     Allergic rhinitis, unspecified allergic rhinitis type 11/06/2015     Priority: Low     Male hypogonadism 11/06/2015     Priority: Low     "    Ht 1.778 m (5' 10\")   Wt 93 kg (205 lb)   BMI 29.41 kg/m      Impression/Plan:     Moderate Sleep apnea. Tolerating PAP well.   - Continue current treatments.     Persistent Excessive daytime sleepiness   Suspect insufficient sleep time, incomplete time on CPAP  - Recommend sleep extension goal > 8 hours for >6 weeks  - Recommend putting CPAP on after bathroom breaks    Juan Treadwell will follow up in about 2 year(s).     Twenty-five minutes spent with patient          "

## 2020-09-17 NOTE — PATIENT INSTRUCTIONS
Your BMI is Body mass index is 29.41 kg/m .  Weight management is a personal decision.  If you are interested in exploring weight loss strategies, the following discussion covers the approaches that may be successful. Body mass index (BMI) is one way to tell whether you are at a healthy weight, overweight, or obese. It measures your weight in relation to your height.  A BMI of 18.5 to 24.9 is in the healthy range. A person with a BMI of 25 to 29.9 is considered overweight, and someone with a BMI of 30 or greater is considered obese. More than two-thirds of American adults are considered overweight or obese.  Being overweight or obese increases the risk for further weight gain. Excess weight may lead to heart disease and diabetes.  Creating and following plans for healthy eating and physical activity may help you improve your health.  Weight control is part of healthy lifestyle and includes exercise, emotional health, and healthy eating habits. Careful eating habits lifelong are the mainstay of weight control. Though there are significant health benefits from weight loss, long-term weight loss with diet alone may be very difficult to achieve- studies show long-term success with dietary management in less than 10% of people. Attaining a healthy weight may be especially difficult to achieve in those with severe obesity. In some cases, medications, devices and surgical management might be considered.  What can you do?  If you are overweight or obese and are interested in methods for weight loss, you should discuss this with your provider.     Consider reducing daily calorie intake by 500 calories.     Keep a food journal.     Avoiding skipping meals, consider cutting portions instead.    Diet combined with exercise helps maintain muscle while optimizing fat loss. Strength training is particularly important for building and maintaining muscle mass. Exercise helps reduce stress, increase energy, and improves fitness.  Increasing exercise without diet control, however, may not burn enough calories to loose weight.       Start walking three days a week 10-20 minutes at a time    Work towards walking thirty minutes five days a week     Eventually, increase the speed of your walking for 1-2 minutes at time    In addition, we recommend that you review healthy lifestyles and methods for weight loss available through the National Institutes of Health patient information sites:  http://win.niddk.nih.gov/publications/index.htm    And look into health and wellness programs that may be available through your health insurance provider, employer, local community center, or saw club.    Weight management plan: Patient was referred to their PCP to discuss a diet and exercise plan.

## 2020-09-21 ENCOUNTER — VIRTUAL VISIT (OUTPATIENT)
Dept: SLEEP MEDICINE | Facility: CLINIC | Age: 55
End: 2020-09-21
Payer: COMMERCIAL

## 2020-09-21 DIAGNOSIS — R40.0 DAYTIME SLEEPINESS: ICD-10-CM

## 2020-09-21 DIAGNOSIS — G47.33 OSA (OBSTRUCTIVE SLEEP APNEA): Primary | Chronic | ICD-10-CM

## 2020-09-21 PROCEDURE — 99214 OFFICE O/P EST MOD 30 MIN: CPT | Mod: 95 | Performed by: INTERNAL MEDICINE

## 2020-10-28 DIAGNOSIS — K21.9 GASTROESOPHAGEAL REFLUX DISEASE WITHOUT ESOPHAGITIS: ICD-10-CM

## 2020-10-29 RX ORDER — FAMOTIDINE 40 MG/1
TABLET, FILM COATED ORAL
Qty: 30 TABLET | Refills: 0 | Status: SHIPPED | OUTPATIENT
Start: 2020-10-29 | End: 2021-02-11

## 2020-11-15 DIAGNOSIS — N52.9 ERECTILE DYSFUNCTION, UNSPECIFIED ERECTILE DYSFUNCTION TYPE: Chronic | ICD-10-CM

## 2020-11-18 RX ORDER — TADALAFIL 5 MG/1
TABLET ORAL
Qty: 90 TABLET | Refills: 0 | Status: SHIPPED | OUTPATIENT
Start: 2020-11-18 | End: 2021-02-11

## 2020-11-18 NOTE — TELEPHONE ENCOUNTER
Prescription approved per [facility] Refill Protocol.    Hien Caballero RN, [facility] Triage

## 2020-12-04 DIAGNOSIS — G25.81 RESTLESS LEGS SYNDROME (RLS): ICD-10-CM

## 2020-12-04 RX ORDER — ROPINIROLE 0.5 MG/1
TABLET, FILM COATED ORAL
Qty: 30 TABLET | Refills: 0 | Status: SHIPPED | OUTPATIENT
Start: 2020-12-04 | End: 2020-12-11

## 2020-12-04 NOTE — TELEPHONE ENCOUNTER
Prescription approved per [facility] Refill Protocol.  Payton Maguire RN  [facility]

## 2020-12-10 ASSESSMENT — ENCOUNTER SYMPTOMS
HEARTBURN: 1
PARESTHESIAS: 0
NAUSEA: 0
SHORTNESS OF BREATH: 0
FEVER: 0
COUGH: 0
PALPITATIONS: 0
CONSTIPATION: 0
EYE PAIN: 0
HEMATOCHEZIA: 0
FREQUENCY: 1
NERVOUS/ANXIOUS: 0
WEAKNESS: 0
HEMATURIA: 0
DIARRHEA: 0
SORE THROAT: 0
MYALGIAS: 1
ABDOMINAL PAIN: 0
DYSURIA: 0
ARTHRALGIAS: 1
JOINT SWELLING: 0
HEADACHES: 0
DIZZINESS: 0
CHILLS: 0

## 2020-12-11 ENCOUNTER — OFFICE VISIT (OUTPATIENT)
Dept: FAMILY MEDICINE | Facility: CLINIC | Age: 55
End: 2020-12-11
Payer: COMMERCIAL

## 2020-12-11 VITALS
SYSTOLIC BLOOD PRESSURE: 128 MMHG | HEIGHT: 69 IN | TEMPERATURE: 97.9 F | DIASTOLIC BLOOD PRESSURE: 84 MMHG | WEIGHT: 224 LBS | HEART RATE: 78 BPM | OXYGEN SATURATION: 96 % | BODY MASS INDEX: 33.18 KG/M2

## 2020-12-11 DIAGNOSIS — Z13.220 SCREENING CHOLESTEROL LEVEL: ICD-10-CM

## 2020-12-11 DIAGNOSIS — Z00.00 ROUTINE GENERAL MEDICAL EXAMINATION AT A HEALTH CARE FACILITY: Primary | ICD-10-CM

## 2020-12-11 DIAGNOSIS — G25.81 RESTLESS LEGS SYNDROME (RLS): ICD-10-CM

## 2020-12-11 DIAGNOSIS — J45.40 MODERATE PERSISTENT ASTHMA WITHOUT COMPLICATION: Chronic | ICD-10-CM

## 2020-12-11 DIAGNOSIS — Z12.5 PROSTATE CANCER SCREENING: ICD-10-CM

## 2020-12-11 DIAGNOSIS — E78.5 HYPERLIPIDEMIA LDL GOAL <130: Chronic | ICD-10-CM

## 2020-12-11 LAB
ALBUMIN SERPL-MCNC: 4.3 G/DL (ref 3.4–5)
ALP SERPL-CCNC: 117 U/L (ref 40–150)
ALT SERPL W P-5'-P-CCNC: 38 U/L (ref 0–70)
ANION GAP SERPL CALCULATED.3IONS-SCNC: 8 MMOL/L (ref 3–14)
AST SERPL W P-5'-P-CCNC: 17 U/L (ref 0–45)
BILIRUB SERPL-MCNC: 0.4 MG/DL (ref 0.2–1.3)
BUN SERPL-MCNC: 16 MG/DL (ref 7–30)
CALCIUM SERPL-MCNC: 9.7 MG/DL (ref 8.5–10.1)
CHLORIDE SERPL-SCNC: 105 MMOL/L (ref 94–109)
CHOLEST SERPL-MCNC: 185 MG/DL
CO2 SERPL-SCNC: 25 MMOL/L (ref 20–32)
CREAT SERPL-MCNC: 1.02 MG/DL (ref 0.66–1.25)
GFR SERPL CREATININE-BSD FRML MDRD: 83 ML/MIN/{1.73_M2}
GLUCOSE SERPL-MCNC: 112 MG/DL (ref 70–99)
HDLC SERPL-MCNC: 50 MG/DL
LDLC SERPL CALC-MCNC: 94 MG/DL
NONHDLC SERPL-MCNC: 135 MG/DL
POTASSIUM SERPL-SCNC: 4.3 MMOL/L (ref 3.4–5.3)
PROT SERPL-MCNC: 8 G/DL (ref 6.8–8.8)
PSA SERPL-ACNC: 2.49 UG/L (ref 0–4)
SODIUM SERPL-SCNC: 138 MMOL/L (ref 133–144)
TRIGL SERPL-MCNC: 205 MG/DL

## 2020-12-11 PROCEDURE — 80061 LIPID PANEL: CPT | Performed by: FAMILY MEDICINE

## 2020-12-11 PROCEDURE — 99396 PREV VISIT EST AGE 40-64: CPT | Performed by: FAMILY MEDICINE

## 2020-12-11 PROCEDURE — 80053 COMPREHEN METABOLIC PANEL: CPT | Performed by: FAMILY MEDICINE

## 2020-12-11 PROCEDURE — 36415 COLL VENOUS BLD VENIPUNCTURE: CPT | Performed by: FAMILY MEDICINE

## 2020-12-11 PROCEDURE — G0103 PSA SCREENING: HCPCS | Performed by: FAMILY MEDICINE

## 2020-12-11 RX ORDER — ROPINIROLE 1 MG/1
1 TABLET, FILM COATED ORAL AT BEDTIME
Qty: 90 TABLET | Refills: 1 | Status: SHIPPED | OUTPATIENT
Start: 2020-12-11 | End: 2021-05-28

## 2020-12-11 RX ORDER — ATORVASTATIN CALCIUM 80 MG/1
80 TABLET, FILM COATED ORAL DAILY
Qty: 90 TABLET | Refills: 3 | Status: SHIPPED | OUTPATIENT
Start: 2020-12-11 | End: 2021-11-10

## 2020-12-11 RX ORDER — MONTELUKAST SODIUM 10 MG/1
1 TABLET ORAL AT BEDTIME
Qty: 90 TABLET | Refills: 3 | Status: SHIPPED | OUTPATIENT
Start: 2020-12-11 | End: 2021-11-10

## 2020-12-11 ASSESSMENT — MIFFLIN-ST. JEOR: SCORE: 1846.44

## 2020-12-11 ASSESSMENT — ENCOUNTER SYMPTOMS
HEMATURIA: 0
PALPITATIONS: 0
FEVER: 0
HEARTBURN: 1
HEMATOCHEZIA: 0
COUGH: 0
ABDOMINAL PAIN: 0
SHORTNESS OF BREATH: 0
DIZZINESS: 0
MYALGIAS: 1
DIARRHEA: 0
DYSURIA: 0
NERVOUS/ANXIOUS: 0
ARTHRALGIAS: 1
NAUSEA: 0
PARESTHESIAS: 0
WEAKNESS: 0
JOINT SWELLING: 0
HEADACHES: 0
EYE PAIN: 0
CHILLS: 0
FREQUENCY: 1
CONSTIPATION: 0
SORE THROAT: 0

## 2020-12-11 ASSESSMENT — PAIN SCALES - GENERAL: PAINLEVEL: NO PAIN (0)

## 2020-12-11 NOTE — PATIENT INSTRUCTIONS
At [facility], we strive to deliver an exceptional experience to you, every time we see you. If you receive a survey, please complete it as we do value your feedback.  If you have MyChart, you can expect to receive results automatically within 24 hours of their completion.  Your provider will send a note interpreting your results as well.   If you do not have MyChart, you should receive your results in about a week by mail.    Your care team:                            Family Medicine Internal Medicine   MD Nir Akhtar, MD Jorge Luis Cottrell MD Katya Georgiev PA-C  Dominique Paz, APRN CNP    Fidel Suazo, MD Pediatrics   Kvng Fitzgerald, PAIlanC  Vivian Amado, CNP MD Leslie Carroll APRN CNP   MD Kamilla Gonzales MD Deborah Mielke, MD Megan Bailey, APRN CNP  Emilee Mallory, PAIlanC  Alanna Bynum, CNP  MD Margarita Rosario MD Angela Wermerskirchen, MD      Clinic hours: Monday - Thursday 7 am-7 pm; Fridays 7 am-5 pm.   Urgent care: Monday - Friday 11 am-9 pm; Saturday and Sunday 9 am-5 pm.    Clinic: [phone]       [facility]: Monday - Thursday 8 am - 7 pm; Friday 8 am - 6 pm  [facility]: [phone]     Use [email or web address] for 24/7 diagnosis and treatment of dozens of conditions.    Preventive Health Recommendations  Male Ages 50 - 64    Yearly exam:             See your health care provider every year in order to  o   Review health changes.   o   Discuss preventive care.    o   Review your medicines if your doctor has prescribed any.     Have a cholesterol test every 5 years, or more frequently if you are at risk for high cholesterol/heart disease.     Have a diabetes test (fasting glucose) every three years. If you are at risk for diabetes, you should have this test more often.     Have a colonoscopy at age 50, or have a yearly FIT  test (stool test). These exams will check for colon cancer.      Talk with your health care provider about whether or not a prostate cancer screening test (PSA) is right for you.    You should be tested each year for STDs (sexually transmitted diseases), if you re at risk.     Shots: Get a flu shot each year. Get a tetanus shot every 10 years.     Nutrition:    Eat at least 5 servings of fruits and vegetables daily.     Eat whole-grain bread, whole-wheat pasta and brown rice instead of white grains and rice.     Get adequate Calcium and Vitamin D.     Lifestyle    Exercise for at least 150 minutes a week (30 minutes a day, 5 days a week). This will help you control your weight and prevent disease.     Limit alcohol to one drink per day.     No smoking.     Wear sunscreen to prevent skin cancer.     See your dentist every six months for an exam and cleaning.     See your eye doctor every 1 to 2 years.

## 2020-12-11 NOTE — PROGRESS NOTES
SUBJECTIVE:   CC: Juan Treadwell is an 55 year old male who presents for preventative health visit.     Patient has been advised of split billing requirements and indicates understanding: Yes  Healthy Habits:     Getting at least 3 servings of Calcium per day:  Yes    Bi-annual eye exam:  Yes    Dental care twice a year:  Yes    Sleep apnea or symptoms of sleep apnea:  Daytime drowsiness and Sleep apnea    Diet:  Regular (no restrictions)    Frequency of exercise:  2-3 days/week    Duration of exercise:  30-45 minutes    Taking medications regularly:  Yes    Medication side effects:  None    PHQ-2 Total Score: 0    Additional concerns today:  Yes      Today's PHQ-2 Score:   PHQ-2 ( 1999 [employer]) 12/10/2020   Q1: Little interest or pleasure in doing things 0   Q2: Feeling down, depressed or hopeless 0   PHQ-2 Score 0   Q1: Little interest or pleasure in doing things Not at all   Q2: Feeling down, depressed or hopeless Not at all   PHQ-2 Score 0       Abuse: Current or Past(Physical, Sexual or Emotional)- No  Do you feel safe in your environment? Yes        Social History     Tobacco Use     Smoking status: Never Smoker     Smokeless tobacco: Never Used     Tobacco comment: no 2nd hand smoke exposure   Substance Use Topics     Alcohol use: Yes     Alcohol/week: 3.0 - 4.0 standard drinks     Types: 3 - 4 Standard drinks or equivalent per week     Comment: 3-4 drinks weekly     If you drink alcohol do you typically have >3 drinks per day or >7 drinks per week? No    Alcohol Use 12/10/2020   Prescreen: >3 drinks/day or >7 drinks/week? No   Prescreen: >3 drinks/day or >7 drinks/week? -   No flowsheet data found.    Last PSA:   PSA   Date Value Ref Range Status   12/11/2019 2.06 0 - 4 ug/L Final     Comment:     Assay Method:  Chemiluminescence using Siemens Vista analyzer       Reviewed orders with patient. Reviewed health maintenance and updated orders accordingly - Yes  Lab work is in process  Labs reviewed in EPIC  BP  "Readings from Last 3 Encounters:   12/11/20 128/84   02/28/20 108/70   12/11/19 118/83    Wt Readings from Last 3 Encounters:   12/11/20 101.6 kg (224 lb)   09/17/20 93 kg (205 lb)   02/28/20 105 kg (231 lb 8 oz)                    Reviewed and updated as needed this visit by clinical staff  Tobacco  Allergies  Meds   Med Hx  Surg Hx  Fam Hx  Soc Hx        Reviewed and updated as needed this visit by Provider                Here today for routine checkup and follow-up on stable issues.  Things are going well overall.  Thinks Requip could be a little bit stronger for restless legs.    Review of Systems   Constitutional: Negative for chills and fever.   HENT: Positive for congestion. Negative for ear pain, hearing loss and sore throat.    Eyes: Negative for pain and visual disturbance.   Respiratory: Negative for cough and shortness of breath.    Cardiovascular: Negative for chest pain, palpitations and peripheral edema.   Gastrointestinal: Positive for heartburn. Negative for abdominal pain, constipation, diarrhea, hematochezia and nausea.   Genitourinary: Positive for frequency and impotence. Negative for discharge, dysuria, genital sores, hematuria and urgency.   Musculoskeletal: Positive for arthralgias and myalgias. Negative for joint swelling.   Skin: Negative for rash.   Neurological: Negative for dizziness, weakness, headaches and paresthesias.   Psychiatric/Behavioral: Negative for mood changes. The patient is not nervous/anxious.        OBJECTIVE:   /84 (BP Location: Right arm, Patient Position: Sitting, Cuff Size: Adult Large)   Pulse 78   Temp 97.9  F (36.6  C) (Oral)   Ht 1.753 m (5' 9\")   Wt 101.6 kg (224 lb)   SpO2 96%   BMI 33.08 kg/m      Physical Exam  GENERAL: healthy, alert and no distress  EYES: Eyes grossly normal to inspection, PERRL and conjunctivae and sclerae normal  HENT: ear canals and TM's normal, nose and mouth without ulcers or lesions  NECK: no adenopathy, no " asymmetry, masses, or scars and thyroid normal to palpation  RESP: lungs clear to auscultation - no rales, rhonchi or wheezes  CV: regular rate and rhythm, normal S1 S2, no S3 or S4, no murmur, click or rub, no peripheral edema and peripheral pulses strong  ABDOMEN: soft, nontender, no hepatosplenomegaly, no masses and bowel sounds normal  MS: no gross musculoskeletal defects noted, no edema  SKIN: no suspicious lesions or rashes  NEURO: Normal strength and tone, mentation intact and speech normal  PSYCH: mentation appears normal, affect normal/bright    Diagnostic Test Results:  Labs reviewed in Epic    ASSESSMENT/PLAN:   1. Routine general medical examination at a health care facility  Routine health maintenance up-to-date otherwise    2. Hyperlipidemia LDL goal <130  Recheck and adjust as needed  - Comprehensive metabolic panel  - atorvastatin (LIPITOR) 80 MG tablet; Take 1 tablet (80 mg) by mouth daily  Dispense: 90 tablet; Refill: 3    3. Moderate persistent asthma without complication  Stable on current regimen.  Continue same plan and routine follow-up.   - montelukast (SINGULAIR) 10 MG tablet; Take 1 tablet (10 mg) by mouth At Bedtime  Dispense: 90 tablet; Refill: 3    4. Restless legs syndrome (RLS)  Increase to 1 mg.  Can increase as high as 4 mg.  - rOPINIRole (REQUIP) 1 MG tablet; Take 1 tablet (1 mg) by mouth At Bedtime  Dispense: 90 tablet; Refill: 1    5. Prostate cancer screening    - Prostate spec antigen screen    6. Screening cholesterol level    - Lipid panel reflex to direct LDL Fasting    Patient has been advised of split billing requirements and indicates understanding: Yes  COUNSELING:   Reviewed preventive health counseling, as reflected in patient instructions       Regular exercise       Healthy diet/nutrition       Vision screening       Colon cancer screening       Prostate cancer screening    Estimated body mass index is 33.08 kg/m  as calculated from the following:    Height as of this  "encounter: 1.753 m (5' 9\").    Weight as of this encounter: 101.6 kg (224 lb).         He reports that he has never smoked. He has never used smokeless tobacco.      Counseling Resources:  ATP IV Guidelines  Pooled Cohorts Equation Calculator  FRAX Risk Assessment  ICSI Preventive Guidelines  Dietary Guidelines for Americans, 2010  USDA's MyPlate  ASA Prophylaxis  Lung CA Screening    Kelli Porter MD  [facility]  "

## 2020-12-12 ASSESSMENT — ASTHMA QUESTIONNAIRES: ACT_TOTALSCORE: 25

## 2021-02-11 DIAGNOSIS — K21.9 GASTROESOPHAGEAL REFLUX DISEASE WITHOUT ESOPHAGITIS: ICD-10-CM

## 2021-02-11 DIAGNOSIS — N52.9 ERECTILE DYSFUNCTION, UNSPECIFIED ERECTILE DYSFUNCTION TYPE: Chronic | ICD-10-CM

## 2021-02-11 RX ORDER — TADALAFIL 5 MG/1
TABLET ORAL
Qty: 90 TABLET | Refills: 0 | Status: SHIPPED | OUTPATIENT
Start: 2021-02-11 | End: 2021-05-03

## 2021-02-11 RX ORDER — FAMOTIDINE 40 MG/1
TABLET, FILM COATED ORAL
Qty: 90 TABLET | Refills: 0 | Status: SHIPPED | OUTPATIENT
Start: 2021-02-11 | End: 2021-05-28

## 2021-03-13 ENCOUNTER — TRANSFERRED RECORDS (OUTPATIENT)
Dept: HEALTH INFORMATION MANAGEMENT | Facility: CLINIC | Age: 56
End: 2021-03-13

## 2021-04-08 ENCOUNTER — IMMUNIZATION (OUTPATIENT)
Dept: NURSING | Facility: CLINIC | Age: 56
End: 2021-04-08
Payer: COMMERCIAL

## 2021-04-08 PROCEDURE — 0001A PR COVID VAC PFIZER DIL RECON 30 MCG/0.3 ML IM: CPT

## 2021-04-08 PROCEDURE — 91300 PR COVID VAC PFIZER DIL RECON 30 MCG/0.3 ML IM: CPT

## 2021-04-29 ENCOUNTER — IMMUNIZATION (OUTPATIENT)
Dept: NURSING | Facility: CLINIC | Age: 56
End: 2021-04-29
Attending: INTERNAL MEDICINE
Payer: COMMERCIAL

## 2021-04-29 PROCEDURE — 91300 PR COVID VAC PFIZER DIL RECON 30 MCG/0.3 ML IM: CPT

## 2021-04-29 PROCEDURE — 0002A PR COVID VAC PFIZER DIL RECON 30 MCG/0.3 ML IM: CPT

## 2021-05-01 DIAGNOSIS — N52.9 ERECTILE DYSFUNCTION, UNSPECIFIED ERECTILE DYSFUNCTION TYPE: Chronic | ICD-10-CM

## 2021-05-03 ENCOUNTER — E-VISIT (OUTPATIENT)
Dept: FAMILY MEDICINE | Facility: CLINIC | Age: 56
End: 2021-05-03
Payer: COMMERCIAL

## 2021-05-03 ENCOUNTER — MYC MEDICAL ADVICE (OUTPATIENT)
Dept: FAMILY MEDICINE | Facility: CLINIC | Age: 56
End: 2021-05-03

## 2021-05-03 DIAGNOSIS — J01.90 ACUTE NON-RECURRENT SINUSITIS, UNSPECIFIED LOCATION: Primary | ICD-10-CM

## 2021-05-03 PROCEDURE — 99421 OL DIG E/M SVC 5-10 MIN: CPT | Performed by: FAMILY MEDICINE

## 2021-05-03 RX ORDER — TADALAFIL 5 MG/1
TABLET ORAL
Qty: 90 TABLET | Refills: 2 | Status: SHIPPED | OUTPATIENT
Start: 2021-05-03 | End: 2022-01-07

## 2021-05-27 DIAGNOSIS — G25.81 RESTLESS LEGS SYNDROME (RLS): ICD-10-CM

## 2021-05-28 RX ORDER — ROPINIROLE 1 MG/1
TABLET, FILM COATED ORAL
Qty: 90 TABLET | Refills: 0 | Status: SHIPPED | OUTPATIENT
Start: 2021-05-28 | End: 2021-08-18

## 2021-05-28 NOTE — TELEPHONE ENCOUNTER
"Routing refill request to provider for review/approval because:  Requested Prescriptions   Pending Prescriptions Disp Refills    rOPINIRole (REQUIP) 1 MG tablet [Pharmacy Med Name: rOPINIRole HCl Oral Tablet 1 MG] 90 tablet 0     Sig: TAKE ONE TABLET BY MOUTH AT BEDTIME       Antiparkinson's Agents Protocol Failed - 5/27/2021 10:02 PM        Failed - CBC on record in past 12 months     Recent Labs   Lab Test 12/11/19  0947   WBC 7.9   RBC 4.64   HGB 14.6   HCT 43.3                    Passed - Blood pressure under 140/90 in past 12 months     BP Readings from Last 3 Encounters:   12/11/20 128/84   02/28/20 108/70   12/11/19 118/83                 Passed - ALT on record in past 12 months         Recent Labs   Lab Test 12/11/20  1044   ALT 38             Passed - Serum Creatinine on file in past 12 months     Recent Labs   Lab Test 12/11/20  1044   CR 1.02       Ok to refill medication if creatinine is low          Passed - Medication is active on med list        Passed - Patient is age 18 or older        Passed - Recent (6 mo) or future (30 days) visit within the authorizing provider's specialty     Patient had office visit in the last 6 months or has a visit in the next 30 days with authorizing provider or within the authorizing provider's specialty.  See \"Patient Info\" tab in inbasket, or \"Choose Columns\" in Meds & Orders section of the refill encounter.                 Hien WHITE, RN          "

## 2021-06-16 ENCOUNTER — E-VISIT (OUTPATIENT)
Dept: FAMILY MEDICINE | Facility: CLINIC | Age: 56
End: 2021-06-16
Payer: COMMERCIAL

## 2021-06-16 DIAGNOSIS — J32.9 CHRONIC SINUSITIS, UNSPECIFIED LOCATION: Primary | ICD-10-CM

## 2021-06-16 PROCEDURE — 99207 PR NON-BILLABLE SERV PER CHARTING: CPT | Performed by: FAMILY MEDICINE

## 2021-06-16 NOTE — PATIENT INSTRUCTIONS
Thank you for choosing us for your care. I think an in-clinic visit would be best next steps based on your symptoms. Please schedule a clinic appointment; you won t be charged for this eVisit.      You can schedule an appointment right here in [facility], or call [phone]

## 2021-06-23 ENCOUNTER — OFFICE VISIT (OUTPATIENT)
Dept: FAMILY MEDICINE | Facility: CLINIC | Age: 56
End: 2021-06-23
Payer: COMMERCIAL

## 2021-06-23 VITALS
WEIGHT: 235 LBS | DIASTOLIC BLOOD PRESSURE: 74 MMHG | BODY MASS INDEX: 34.7 KG/M2 | SYSTOLIC BLOOD PRESSURE: 124 MMHG | HEART RATE: 64 BPM | TEMPERATURE: 97.5 F | OXYGEN SATURATION: 95 % | RESPIRATION RATE: 16 BRPM

## 2021-06-23 DIAGNOSIS — J34.0 NASAL SEPTUM ULCERATION: Primary | ICD-10-CM

## 2021-06-23 PROCEDURE — 99214 OFFICE O/P EST MOD 30 MIN: CPT | Performed by: FAMILY MEDICINE

## 2021-06-23 NOTE — Clinical Note
Steven,  Hoping you can get this kallie in sooner rather than later.  Has had ongoing nasal symptoms for a few months.  Upon examination today he has significant bleeding and crusting along the nasal septum and I think there might actually be a perforation.  Perhaps I am just crazy.  He is a CPAP user.  But obviously I am worried about cancer.  Naresh

## 2021-06-23 NOTE — PROGRESS NOTES
"    Assessment & Plan     Nasal septum ulceration  Issue that has been going on for a couple of months and patient had contacted me virtually about this.  Mention ongoing crusting in his nasal passages and there were some other features that seemed consistent with sinusitis but empiric treatment of antibiotics really did not do anything.  Always feels as though there is some obstruction deep in the nasal passages and has a little bit of postnasal drip.  But he also frequently gets scaly discharge from in his nose.  Upon exam both sides are extremely irritated and scaled along the nasal septum and it is possible that there is a perforation.  Patient is a CPAP user which may relate to this but he absolutely needs to be evaluated by ENT.  - OTOLARYNGOLOGY REFERRAL       BMI:   Estimated body mass index is 34.7 kg/m  as calculated from the following:    Height as of 12/11/20: 1.753 m (5' 9\").    Weight as of this encounter: 106.6 kg (235 lb).   Weight management plan: Discussed healthy diet and exercise guidelines    See Patient Instructions    Return in about 2 weeks (around 7/7/2021) for Based upon test results.    Kelli Porter MD  [facility] DEONTE STYLES is a 55 year old who presents for the following health issues     URI    History of Present Illness   He consumes 4 sweetened beverage(s) daily. He exercises with enough effort to increase his heart rate 5 days per week.   He is taking medications regularly.       Acute Illness  Acute illness concerns: sinus issue  Onset/Duration: 2-3 months  Symptoms:  Fever: no  Chills/Sweats: no  Headache (location?): YES  Sinus Pressure: YES  Conjunctivitis:  no  Ear Pain: no  Rhinorrhea: YES  Congestion: YES  Sore Throat: no  Cough: no  Wheeze: no  Decreased Appetite: no  Nausea: no  Vomiting: no  Diarrhea: no  Dysuria/Freq.: no  Dysuria or Hematuria: no  Fatigue/Achiness: no  Sick/Strep Exposure: no  Therapies tried and outcome: claire, " zyrtec, singulair, mucinex nasal spray    Here today in follow-up of ongoing nasal congestion, scale, symptoms as above.  Contacted me remotely but things are not getting better with a course of antibiotics.    Review of Systems   Constitutional, HEENT, cardiovascular, pulmonary, gi and gu systems are negative, except as otherwise noted.      Objective    /74 (BP Location: Right arm, Patient Position: Sitting, Cuff Size: Adult Large)   Pulse 64   Temp 97.5  F (36.4  C) (Tympanic)   Resp 16   Wt 106.6 kg (235 lb)   SpO2 95%   BMI 34.70 kg/m    Body mass index is 34.7 kg/m .  Physical Exam   Alert, pleasant, upbeat, and in no apparent discomfort.  S1 and S2 normal, no murmurs, clicks, gallops or rubs. Regular rate and rhythm. Chest is clear; no wheezes or rales. No edema or JVD.  Ears clear bilaterally  Examination of the nose reveals a significant amount of crusty scale along the nasal septum bilaterally and it is possible there is a perforation  No obvious cervical lymphadenopathy  Past labs reviewed with the patient.

## 2021-06-24 ENCOUNTER — TELEPHONE (OUTPATIENT)
Dept: OTOLARYNGOLOGY | Facility: CLINIC | Age: 56
End: 2021-06-24

## 2021-06-24 ASSESSMENT — ASTHMA QUESTIONNAIRES: ACT_TOTALSCORE: 24

## 2021-06-24 NOTE — TELEPHONE ENCOUNTER
Called patient and scheduled appointment with Dr. Palmer.  July 1 at 3:15.     Erin Alcala MA, [facility] ......6/24/2021...8:55 AM

## 2021-06-30 NOTE — PROGRESS NOTES
History of Present Illness - Juan Treadwell is a very pleasant 55 year old male here to see me for the first time for a nasal problem.    He tells me that for a long time he has had a chronic ulceration and crust in the nose.  It can be painful and he always has to be cleaning out these crusts.  There is also post nasal drainage from this as well.  It has been off an don for years, but has become very unbearable for the last 2 months.  It is causing a lot of issues with his CPAP as well.  He has used CPAP for over five years.    Otherwise no previous ENT surgery or head and neck.    Past Medical History -   Patient Active Problem List   Diagnosis     DIAMOND (obstructive sleep apnea)- moderate (AHI 17)     Hyperlipidemia LDL goal <130     Allergic rhinitis, unspecified allergic rhinitis type     Male hypogonadism     Erectile dysfunction, unspecified erectile dysfunction type     Gastroesophageal reflux disease without esophagitis     Restless legs syndrome (RLS)     Moderate persistent asthma without complication       Current Medications -   Current Outpatient Medications:      albuterol (PROAIR HFA/PROVENTIL HFA/VENTOLIN HFA) 108 (90 Base) MCG/ACT inhaler, Inhale 2 puffs into the lungs every 4 hours as needed for shortness of breath / dyspnea or wheezing, Disp: 1 Inhaler, Rfl: 2     atorvastatin (LIPITOR) 80 MG tablet, Take 1 tablet (80 mg) by mouth daily, Disp: 90 tablet, Rfl: 3     Cholecalciferol (VITAMIN D) 2000 UNITS tablet, Take 2,000 Units by mouth, Disp: , Rfl:      famotidine (PEPCID) 40 MG tablet, TAKE 1 TABLET BY MOUTH ONE TIME DAILY , Disp: 90 tablet, Rfl: 1     montelukast (SINGULAIR) 10 MG tablet, Take 1 tablet (10 mg) by mouth At Bedtime, Disp: 90 tablet, Rfl: 3     Multiple Vitamins-Minerals (MULTIVITAMIN OR), , Disp: , Rfl:      order for DME, Equipment being ordered: 1 cc syringe, with 23 gauge 1 inch needles Use twice monthly, Disp: 10 Units, Rfl: 5     order for DME, Resmed Airsense 10 auto cpap  10-14 cm, Mirage Fx nasal mask std, Disp: , Rfl:      rOPINIRole (REQUIP) 1 MG tablet, TAKE ONE TABLET BY MOUTH AT BEDTIME , Disp: 90 tablet, Rfl: 0     tadalafil (CIALIS) 5 MG tablet, TAKE 1 TABLET BY MOUTH ONE TIME DAILY , Disp: 90 tablet, Rfl: 2    Allergies -   Allergies   Allergen Reactions     Codeine Nausea and Vomiting       Social History -   Social History     Socioeconomic History     Marital status:      Spouse name: Not on file     Number of children: Not on file     Years of education: Not on file     Highest education level: Not on file   Occupational History     Occupation:  IT   Social Needs     Financial resource strain: Not on file     Food insecurity     Worry: Not on file     Inability: Not on file     Transportation needs     Medical: Not on file     Non-medical: Not on file   Tobacco Use     Smoking status: Never Smoker     Smokeless tobacco: Never Used     Tobacco comment: no 2nd hand smoke exposure   Substance and Sexual Activity     Alcohol use: Yes     Alcohol/week: 3.0 - 4.0 standard drinks     Comment: 3-4 drinks weekly     Drug use: Never     Sexual activity: Yes     Partners: Female     Birth control/protection: I.U.D.   Lifestyle     Physical activity     Days per week: Not on file     Minutes per session: Not on file     Stress: Not on file   Relationships     Social connections     Talks on phone: Not on file     Gets together: Not on file     Attends Cheondoism service: Not on file     Active member of club or organization: Not on file     Attends meetings of clubs or organizations: Not on file     Relationship status: Not on file     Intimate partner violence     Fear of current or ex partner: Not on file     Emotionally abused: Not on file     Physically abused: Not on file     Forced sexual activity: Not on file   Other Topics Concern     Parent/sibling w/ CABG, MI or angioplasty before 65F 55M? No   Social History Narrative     Not on file       Family  History -   Family History   Problem Relation Age of Onset     Crohn's Disease Brother      Depression Brother      Prostate Cancer Father      Diabetes Father 80     Breast Cancer Sister      Breast Cancer Mother      Thyroid Disease Brother      Anesthesia Reaction No family hx of      Colon Polyps No family hx of      Ulcerative Colitis No family hx of      Cancer - colorectal No family hx of      Coronary Artery Disease No family hx of        Review of Systems - As per HPI and PMHx, otherwise 10+ system review of the head and neck, and general constitution is negative.    Physical Exam  /77 (BP Location: Right arm, Patient Position: Sitting, Cuff Size: Adult Large)   Pulse 80   Wt 107.5 kg (237 lb)   SpO2 96%   BMI 35.00 kg/m      General - The patient is well nourished and well developed, and appears to have good nutritional status.  Alert and oriented to person and place, answers questions and cooperates with examination appropriately.   Head and Face - Normocephalic and atraumatic, with no gross asymmetry noted of the contour of the facial features.  The facial nerve is intact, with strong symmetric movements.  Voice and Breathing - The patient was breathing comfortably without the use of accessory muscles. There was no wheezing, stridor, or stertor.  The patients voice was clear and strong, and had appropriate pitch and quality.  Ears - The tympanic membranes are normal in appearance, bony landmarks are intact.  No retraction, perforation, or masses.  No fluid or purulence was seen in the external canal or the middle ear. No evidence of infection of the middle ear or external canal, cerumen was normal in appearance.  Eyes - Extraocular movements intact, and the pupils were reactive to light.  Sclera were not icteric or injected, conjunctiva were pink and moist.  Nose - External contour is symmetric, no gross deflection or scars.  Nasal speculum exam was impressive.  There is a 1cm size perforation  in the septum anteriorly.  The surfaces of the perforation, and all the nasal cavity are covered with pus-covered crusts of dried mucous.  I cultured this.      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    Either way, our first priority is to get the infection under control    I have taken cultures today, and will call with the results and plan of treatment.  Hopefully clearing the infection will improved things, as repairing a septal perforation is not a very successful surgery.

## 2021-07-01 ENCOUNTER — OFFICE VISIT (OUTPATIENT)
Dept: OTOLARYNGOLOGY | Facility: CLINIC | Age: 56
End: 2021-07-01
Payer: COMMERCIAL

## 2021-07-01 VITALS
SYSTOLIC BLOOD PRESSURE: 114 MMHG | BODY MASS INDEX: 35 KG/M2 | OXYGEN SATURATION: 96 % | DIASTOLIC BLOOD PRESSURE: 77 MMHG | HEART RATE: 80 BPM | WEIGHT: 237 LBS

## 2021-07-01 DIAGNOSIS — J34.89 NASAL SEPTAL PERFORATION: ICD-10-CM

## 2021-07-01 DIAGNOSIS — J31.0 PURULENT RHINITIS: Primary | ICD-10-CM

## 2021-07-01 DIAGNOSIS — J34.89 NASAL OBSTRUCTION: ICD-10-CM

## 2021-07-01 DIAGNOSIS — J31.0 CHRONIC RHINITIS: ICD-10-CM

## 2021-07-01 PROCEDURE — 87186 SC STD MICRODIL/AGAR DIL: CPT | Mod: 91 | Performed by: OTOLARYNGOLOGY

## 2021-07-01 PROCEDURE — 87102 FUNGUS ISOLATION CULTURE: CPT | Performed by: OTOLARYNGOLOGY

## 2021-07-01 PROCEDURE — 99203 OFFICE O/P NEW LOW 30 MIN: CPT | Performed by: OTOLARYNGOLOGY

## 2021-07-01 PROCEDURE — 87077 CULTURE AEROBIC IDENTIFY: CPT | Performed by: OTOLARYNGOLOGY

## 2021-07-01 PROCEDURE — 87076 CULTURE ANAEROBE IDENT EACH: CPT | Performed by: OTOLARYNGOLOGY

## 2021-07-01 PROCEDURE — 87070 CULTURE OTHR SPECIMN AEROBIC: CPT | Performed by: OTOLARYNGOLOGY

## 2021-07-01 PROCEDURE — 87075 CULTR BACTERIA EXCEPT BLOOD: CPT | Performed by: OTOLARYNGOLOGY

## 2021-07-01 NOTE — LETTER
7/1/2021         RE: Juan Treadwell  [id number]  [facility] [address]        Dear Colleague,    Thank you for referring your patient, Juan Treadwell, to the [facility]. Please see a copy of my visit note below.    History of Present Illness - Juan Treadwell is a very pleasant 55 year old male here to see me for the first time for a nasal problem.    He tells me that for a long time he has had a chronic ulceration and crust in the nose.  It can be painful and he always has to be cleaning out these crusts.  There is also post nasal drainage from this as well.  It has been off an don for years, but has become very unbearable for the last 2 months.  It is causing a lot of issues with his CPAP as well.  He has used CPAP for over five years.    Otherwise no previous ENT surgery or head and neck.    Past Medical History -   Patient Active Problem List   Diagnosis     DIAMOND (obstructive sleep apnea)- moderate (AHI 17)     Hyperlipidemia LDL goal <130     Allergic rhinitis, unspecified allergic rhinitis type     Male hypogonadism     Erectile dysfunction, unspecified erectile dysfunction type     Gastroesophageal reflux disease without esophagitis     Restless legs syndrome (RLS)     Moderate persistent asthma without complication       Current Medications -   Current Outpatient Medications:      albuterol (PROAIR HFA/PROVENTIL HFA/VENTOLIN HFA) 108 (90 Base) MCG/ACT inhaler, Inhale 2 puffs into the lungs every 4 hours as needed for shortness of breath / dyspnea or wheezing, Disp: 1 Inhaler, Rfl: 2     atorvastatin (LIPITOR) 80 MG tablet, Take 1 tablet (80 mg) by mouth daily, Disp: 90 tablet, Rfl: 3     Cholecalciferol (VITAMIN D) 2000 UNITS tablet, Take 2,000 Units by mouth, Disp: , Rfl:      famotidine (PEPCID) 40 MG tablet, TAKE 1 TABLET BY MOUTH ONE TIME DAILY , Disp: 90 tablet, Rfl: 1     montelukast (SINGULAIR) 10 MG tablet, Take 1 tablet (10 mg) by mouth At Bedtime, Disp: 90 tablet, Rfl: 3      Multiple Vitamins-Minerals (MULTIVITAMIN OR), , Disp: , Rfl:      order for DME, Equipment being ordered: 1 cc syringe, with 23 gauge 1 inch needles Use twice monthly, Disp: 10 Units, Rfl: 5     order for DME, Resmed Airsense 10 auto cpap 10-14 cm, Mirage Fx nasal mask std, Disp: , Rfl:      rOPINIRole (REQUIP) 1 MG tablet, TAKE ONE TABLET BY MOUTH AT BEDTIME , Disp: 90 tablet, Rfl: 0     tadalafil (CIALIS) 5 MG tablet, TAKE 1 TABLET BY MOUTH ONE TIME DAILY , Disp: 90 tablet, Rfl: 2    Allergies -   Allergies   Allergen Reactions     Codeine Nausea and Vomiting       Social History -   Social History     Socioeconomic History     Marital status:      Spouse name: Not on file     Number of children: Not on file     Years of education: Not on file     Highest education level: Not on file   Occupational History     Occupation:  IT   Social Needs     Financial resource strain: Not on file     Food insecurity     Worry: Not on file     Inability: Not on file     Transportation needs     Medical: Not on file     Non-medical: Not on file   Tobacco Use     Smoking status: Never Smoker     Smokeless tobacco: Never Used     Tobacco comment: no 2nd hand smoke exposure   Substance and Sexual Activity     Alcohol use: Yes     Alcohol/week: 3.0 - 4.0 standard drinks     Comment: 3-4 drinks weekly     Drug use: Never     Sexual activity: Yes     Partners: Female     Birth control/protection: I.U.D.   Lifestyle     Physical activity     Days per week: Not on file     Minutes per session: Not on file     Stress: Not on file   Relationships     Social connections     Talks on phone: Not on file     Gets together: Not on file     Attends [employer] service: Not on file     Active member of club or organization: Not on file     Attends meetings of clubs or organizations: Not on file     Relationship status: Not on file     Intimate partner violence     Fear of current or ex partner: Not on file     Emotionally  abused: Not on file     Physically abused: Not on file     Forced sexual activity: Not on file   Other Topics Concern     Parent/sibling w/ CABG, MI or angioplasty before 65F 55M? No   Social History Narrative     Not on file       Family History -   Family History   Problem Relation Age of Onset     Crohn's Disease Brother      Depression Brother      Prostate Cancer Father      Diabetes Father 80     Breast Cancer Sister      Breast Cancer Mother      Thyroid Disease Brother      Anesthesia Reaction No family hx of      Colon Polyps No family hx of      Ulcerative Colitis No family hx of      Cancer - colorectal No family hx of      Coronary Artery Disease No family hx of        Review of Systems - As per HPI and PMHx, otherwise 10+ system review of the head and neck, and general constitution is negative.    Physical Exam  /77 (BP Location: Right arm, Patient Position: Sitting, Cuff Size: Adult Large)   Pulse 80   Wt 107.5 kg (237 lb)   SpO2 96%   BMI 35.00 kg/m      General - The patient is well nourished and well developed, and appears to have good nutritional status.  Alert and oriented to person and place, answers questions and cooperates with examination appropriately.   Head and Face - Normocephalic and atraumatic, with no gross asymmetry noted of the contour of the facial features.  The facial nerve is intact, with strong symmetric movements.  Voice and Breathing - The patient was breathing comfortably without the use of accessory muscles. There was no wheezing, stridor, or stertor.  The patients voice was clear and strong, and had appropriate pitch and quality.  Ears - The tympanic membranes are normal in appearance, bony landmarks are intact.  No retraction, perforation, or masses.  No fluid or purulence was seen in the external canal or the middle ear. No evidence of infection of the middle ear or external canal, cerumen was normal in appearance.  Eyes - Extraocular movements intact, and the  pupils were reactive to light.  Sclera were not icteric or injected, conjunctiva were pink and moist.  Nose - External contour is symmetric, no gross deflection or scars.  Nasal speculum exam was impressive.  There is a 1cm size perforation in the septum anteriorly.  The surfaces of the perforation, and all the nasal cavity are covered with pus-covered crusts of dried mucous.  I cultured this.      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    Either way, our first priority is to get the infection under control    I have taken cultures today, and will call with the results and plan of treatment.  Hopefully clearing the infection will improved things, as repairing a septal perforation is not a very successful surgery.          Again, thank you for allowing me to participate in the care of your patient.        Sincerely,        Mendez Palmer MD

## 2021-07-04 LAB
BACTERIA SPEC CULT: ABNORMAL
Lab: ABNORMAL
SPECIMEN SOURCE: ABNORMAL

## 2021-07-05 DIAGNOSIS — J34.89 NASAL SEPTAL PERFORATION: ICD-10-CM

## 2021-07-05 DIAGNOSIS — J31.0 CHRONIC RHINITIS: ICD-10-CM

## 2021-07-05 DIAGNOSIS — J31.0 PURULENT RHINITIS: Primary | ICD-10-CM

## 2021-07-05 NOTE — PROGRESS NOTES
Called and spoke with Juan.  Culture results discussed, will use Gent Dex nasal irrigations for at least one month, and then in-person follow up to see how things are progressing

## 2021-07-08 LAB
BACTERIA SPEC CULT: ABNORMAL
BACTERIA SPEC CULT: ABNORMAL
Lab: ABNORMAL
SPECIMEN SOURCE: ABNORMAL

## 2021-07-29 LAB
FUNGUS SPEC CULT: NORMAL
Lab: NORMAL
SPECIMEN SOURCE: NORMAL

## 2021-08-18 DIAGNOSIS — G25.81 RESTLESS LEGS SYNDROME (RLS): ICD-10-CM

## 2021-08-18 RX ORDER — ROPINIROLE 1 MG/1
TABLET, FILM COATED ORAL
Qty: 90 TABLET | Refills: 0 | Status: SHIPPED | OUTPATIENT
Start: 2021-08-18 | End: 2021-11-10

## 2021-08-18 NOTE — PROGRESS NOTES
"History of Present Illness - Juan Treadwell is a 56 year old male here to see me in follow up from an intiial visit on 7/1/21.    To review,  for a long time he has had a chronic ulceration and crust in the nose.  It can be painful and he always has to be cleaning out these crusts.  There is also post nasal drainage from this as well.  It has been off an don for years, but has become very unbearable for the last 2 months.  It is causing a lot of issues with his CPAP as well.  He has used CPAP for over five years.    On exam, I found a previously undiagnosed large septal perforation and florid purulent rhinitis with pus covered crusts bilaterally.  I cultured, and used the results to place him on an antibiotic nasal irrigation.  He is here for follow up.  Of note, it was not possible to understand if the purulent rhinitis and recurrent digital manipulation have caused the perforation, or if the perforation was there and caused the dryness and subsequent infeciton.        Past medical history -   Patient Active Problem List   Diagnosis     DIAMOND (obstructive sleep apnea)- moderate (AHI 17)     Hyperlipidemia LDL goal <130     Allergic rhinitis, unspecified allergic rhinitis type     Male hypogonadism     Erectile dysfunction, unspecified erectile dysfunction type     Gastroesophageal reflux disease without esophagitis     Restless legs syndrome (RLS)     Moderate persistent asthma without complication       /79   Pulse 80   Ht 1.753 m (5' 9\")   Wt 106.9 kg (235 lb 9.6 oz)   SpO2 99%   BMI 34.79 kg/m      General - The patient is well nourished and well developed, and appears to have good nutritional status.  Alert and oriented to person and place, answers questions and cooperates with examination appropriately.   Head and Face - Normocephalic and atraumatic, with no gross asymmetry noted of the contour of the facial features.  The facial nerve is intact, with strong symmetric movements.  Eyes - Extraocular " movements intact, and the pupils were reactive to light.  Sclera were not icteric or injected, conjunctiva were pink and moist.  Mouth - Examination of the oral cavity shows pink, healthy, moist mucosa.  No lesions or ulceration noted.  The dentition are in good repair.  The tongue is mobile and midline.  Nose - The nasal cavity is dramatically better.  80-90% less pus and crusts seen.  The perforation is still about 1cm in size.    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    Follow-up at that time, and hopefully once we are infection free, the perforation will start to close.  If not, we can discuss options like septal button.

## 2021-08-18 NOTE — TELEPHONE ENCOUNTER
Routing refill request to provider for review/approval because:  Labs not current:  CBC  Asuncion Burrell BSN, RN

## 2021-08-20 ENCOUNTER — OFFICE VISIT (OUTPATIENT)
Dept: OTOLARYNGOLOGY | Facility: CLINIC | Age: 56
End: 2021-08-20
Payer: COMMERCIAL

## 2021-08-20 VITALS
WEIGHT: 235.6 LBS | OXYGEN SATURATION: 99 % | HEART RATE: 80 BPM | BODY MASS INDEX: 34.9 KG/M2 | HEIGHT: 69 IN | DIASTOLIC BLOOD PRESSURE: 79 MMHG | SYSTOLIC BLOOD PRESSURE: 117 MMHG

## 2021-08-20 DIAGNOSIS — J31.0 PURULENT RHINITIS: Primary | ICD-10-CM

## 2021-08-20 DIAGNOSIS — J34.89 NASAL OBSTRUCTION: ICD-10-CM

## 2021-08-20 DIAGNOSIS — J31.0 CHRONIC RHINITIS: ICD-10-CM

## 2021-08-20 DIAGNOSIS — J34.89 NASAL SEPTAL PERFORATION: ICD-10-CM

## 2021-08-20 PROCEDURE — 99213 OFFICE O/P EST LOW 20 MIN: CPT | Performed by: OTOLARYNGOLOGY

## 2021-08-20 ASSESSMENT — MIFFLIN-ST. JEOR: SCORE: 1894.05

## 2021-08-20 NOTE — LETTER
"    8/20/2021         RE: Juan Treadwell  [address]  [facility] [place] [address]        Dear Colleague,    Thank you for referring your patient, Juan Treadwell, to the [facility]. Please see a copy of my visit note below.    History of Present Illness - Juan Treadwell is a 56 year old male here to see me in follow up from an intiial visit on 7/1/21.    To review,  for a long time he has had a chronic ulceration and crust in the nose.  It can be painful and he always has to be cleaning out these crusts.  There is also post nasal drainage from this as well.  It has been off an don for years, but has become very unbearable for the last 2 months.  It is causing a lot of issues with his CPAP as well.  He has used CPAP for over five years.    On exam, I found a previously undiagnosed large septal perforation and florid purulent rhinitis with pus covered crusts bilaterally.  I cultured, and used the results to place him on an antibiotic nasal irrigation.  He is here for follow up.  Of note, it was not possible to understand if the purulent rhinitis and recurrent digital manipulation have caused the perforation, or if the perforation was there and caused the dryness and subsequent infeciton.        Past medical history -   Patient Active Problem List   Diagnosis     DIAMOND (obstructive sleep apnea)- moderate (AHI 17)     Hyperlipidemia LDL goal <130     Allergic rhinitis, unspecified allergic rhinitis type     Male hypogonadism     Erectile dysfunction, unspecified erectile dysfunction type     Gastroesophageal reflux disease without esophagitis     Restless legs syndrome (RLS)     Moderate persistent asthma without complication       /79   Pulse 80   Ht 1.753 m (5' 9\")   Wt 106.9 kg (235 lb 9.6 oz)   SpO2 99%   BMI 34.79 kg/m      General - The patient is well nourished and well developed, and appears to have good nutritional status.  Alert and oriented to person and place, answers questions and " cooperates with examination appropriately.   Head and Face - Normocephalic and atraumatic, with no gross asymmetry noted of the contour of the facial features.  The facial nerve is intact, with strong symmetric movements.  Eyes - Extraocular movements intact, and the pupils were reactive to light.  Sclera were not icteric or injected, conjunctiva were pink and moist.  Mouth - Examination of the oral cavity shows pink, healthy, moist mucosa.  No lesions or ulceration noted.  The dentition are in good repair.  The tongue is mobile and midline.  Nose - The nasal cavity is dramatically better.  80-90% less pus and crusts seen.  The perforation is still about 1cm in size.    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    Follow-up at that time, and hopefully once we are infection free, the perforation will start to close.  If not, we can discuss options like septal button.        Again, thank you for allowing me to participate in the care of your patient.        Sincerely,        Mendez Palmer MD

## 2021-08-20 NOTE — PATIENT INSTRUCTIONS
Scheduling Information  To schedule your CT/MRI scan, please contact [facility] at [phone] OR [facility] at [phone]    To schedule your Surgery, please contact our Specialty Schedulers at [phone]      ENT Clinic Locations Clinic Hours Telephone Number     [facility]  [address] [place] [address]   Monday:           1:00pm -- 5:00pm    Friday:              8:00am - 12:00pm   To schedule/reschedule an appointment with   Dr. Palmer,   please contact our   Specialty Scheduling Department at:     [phone]       [facility]  [address] [address] Tuesday:          8:00am -- 2:00pm         Urgent Care Locations Clinic Hours Telephone Numbers     [facility]  [address] [place] [address]     Monday-Friday:     11:00am - 9:00pm    Saturday-Sunday:  9:00am - 5:00pm   [phone]     [facility]  [address] [place] [address]     Monday-Friday:      5:00pm - 9:00pm     Saturday-Sunday:  9:00am - 5:00pm   [phone]

## 2021-10-23 ENCOUNTER — HEALTH MAINTENANCE LETTER (OUTPATIENT)
Age: 56
End: 2021-10-23

## 2021-10-25 NOTE — PROGRESS NOTES
History of Present Illness - Juan Treadwell is a 56 year old male here to see me in follow up from 8/20/2021, with an intiial visit on 7/1/21.    To review,  for a long time he has had a chronic ulceration and crust in the nose.  It can be painful and he always has to be cleaning out these crusts.  There is also post nasal drainage from this as well.  It has been off an don for years, but has become very unbearable for the last 2 months.  It is causing a lot of issues with his CPAP as well.  He has used CPAP for over five years.    On exam, I found a previously undiagnosed large septal perforation and florid purulent rhinitis with pus covered crusts bilaterally.  I cultured, and used the results to place him on an antibiotic nasal irrigation.  He is here for follow up.  Of note, it was not possible to understand if the purulent rhinitis and recurrent digital manipulation have caused the perforation, or if the perforation was there and caused the dryness and subsequent infeciton.    On exam on 8/20/21, there had been a dramatic improvement.  About 90% better, but the perforation was unchanged.  Therefore I asked him to continue the medicated irrigations, to see if control of the purulence would allow some spontaneous improvement of the perforation.  He tells me that his symptoms are all dramatically better.  No congestion or fullness.    Past medical history -   Patient Active Problem List   Diagnosis     DIAMOND (obstructive sleep apnea)- moderate (AHI 17)     Hyperlipidemia LDL goal <130     Allergic rhinitis, unspecified allergic rhinitis type     Male hypogonadism     Erectile dysfunction, unspecified erectile dysfunction type     Gastroesophageal reflux disease without esophagitis     Restless legs syndrome (RLS)     Moderate persistent asthma without complication       /76   Pulse 63   Wt 98 kg (216 lb)   BMI 31.90 kg/m      General - The patient is well nourished and well developed, and appears to have  good nutritional status.  Alert and oriented to person and place, answers questions and cooperates with examination appropriately.   Head and Face - Normocephalic and atraumatic, with no gross asymmetry noted of the contour of the facial features.  The facial nerve is intact, with strong symmetric movements.  Eyes - Extraocular movements intact, and the pupils were reactive to light.  Sclera were not icteric or injected, conjunctiva were pink and moist.  Mouth - Examination of the oral cavity shows pink, healthy, moist mucosa.  No lesions or ulceration noted.  The dentition are in good repair.  The tongue is mobile and midline.  Nose - The nasal cavity is dramatically better.  80-90% less pus and crusts seen.  The perforation is still about 1cm in size with some crusted blood on the posterior edge.    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    We discussed placement of a septal button.  However, he tells me thatt the nose feels great and the breathing is good.  So, we will observe and I have recommended saline sprays for moisture.  Return to me if the nasal health changes for the worse again.

## 2021-10-28 ENCOUNTER — OFFICE VISIT (OUTPATIENT)
Dept: OTOLARYNGOLOGY | Facility: CLINIC | Age: 56
End: 2021-10-28
Payer: COMMERCIAL

## 2021-10-28 VITALS
DIASTOLIC BLOOD PRESSURE: 76 MMHG | SYSTOLIC BLOOD PRESSURE: 129 MMHG | WEIGHT: 216 LBS | HEART RATE: 63 BPM | BODY MASS INDEX: 31.9 KG/M2

## 2021-10-28 DIAGNOSIS — J34.89 NASAL SEPTAL PERFORATION: ICD-10-CM

## 2021-10-28 DIAGNOSIS — J31.0 PURULENT RHINITIS: Primary | ICD-10-CM

## 2021-10-28 DIAGNOSIS — J31.0 CHRONIC RHINITIS: ICD-10-CM

## 2021-10-28 DIAGNOSIS — J34.89 NASAL OBSTRUCTION: ICD-10-CM

## 2021-10-28 PROCEDURE — 99213 OFFICE O/P EST LOW 20 MIN: CPT | Performed by: OTOLARYNGOLOGY

## 2021-10-28 NOTE — LETTER
10/28/2021         RE: Juan Treadwell  [address]  [facility] [address]        Dear Colleague,    Thank you for referring your patient, Juan Treadwell, to the [facility]. Please see a copy of my visit note below.    History of Present Illness - Juan Treadwell is a 56 year old male here to see me in follow up from 8/20/2021, with an intiial visit on 7/1/21.    To review,  for a long time he has had a chronic ulceration and crust in the nose.  It can be painful and he always has to be cleaning out these crusts.  There is also post nasal drainage from this as well.  It has been off an don for years, but has become very unbearable for the last 2 months.  It is causing a lot of issues with his CPAP as well.  He has used CPAP for over five years.    On exam, I found a previously undiagnosed large septal perforation and florid purulent rhinitis with pus covered crusts bilaterally.  I cultured, and used the results to place him on an antibiotic nasal irrigation.  He is here for follow up.  Of note, it was not possible to understand if the purulent rhinitis and recurrent digital manipulation have caused the perforation, or if the perforation was there and caused the dryness and subsequent infeciton.    On exam on 8/20/21, there had been a dramatic improvement.  About 90% better, but the perforation was unchanged.  Therefore I asked him to continue the medicated irrigations, to see if control of the purulence would allow some spontaneous improvement of the perforation.  He tells me that his symptoms are all dramatically better.  No congestion or fullness.    Past medical history -   Patient Active Problem List   Diagnosis     DIAMOND (obstructive sleep apnea)- moderate (AHI 17)     Hyperlipidemia LDL goal <130     Allergic rhinitis, unspecified allergic rhinitis type     Male hypogonadism     Erectile dysfunction, unspecified erectile dysfunction type     Gastroesophageal reflux disease without  esophagitis     Restless legs syndrome (RLS)     Moderate persistent asthma without complication       /76   Pulse 63   Wt 98 kg (216 lb)   BMI 31.90 kg/m      General - The patient is well nourished and well developed, and appears to have good nutritional status.  Alert and oriented to person and place, answers questions and cooperates with examination appropriately.   Head and Face - Normocephalic and atraumatic, with no gross asymmetry noted of the contour of the facial features.  The facial nerve is intact, with strong symmetric movements.  Eyes - Extraocular movements intact, and the pupils were reactive to light.  Sclera were not icteric or injected, conjunctiva were pink and moist.  Mouth - Examination of the oral cavity shows pink, healthy, moist mucosa.  No lesions or ulceration noted.  The dentition are in good repair.  The tongue is mobile and midline.  Nose - The nasal cavity is dramatically better.  80-90% less pus and crusts seen.  The perforation is still about 1cm in size with some crusted blood on the posterior edge.    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    We discussed placement of a septal button.  However, he tells me thatt the nose feels great and the breathing is good.  So, we will observe and I have recommended saline sprays for moisture.  Return to me if the nasal health changes for the worse again.        Again, thank you for allowing me to participate in the care of your patient.        Sincerely,        Mendez Palmer MD

## 2021-11-07 DIAGNOSIS — G25.81 RESTLESS LEGS SYNDROME (RLS): ICD-10-CM

## 2021-11-07 DIAGNOSIS — J45.40 MODERATE PERSISTENT ASTHMA WITHOUT COMPLICATION: Chronic | ICD-10-CM

## 2021-11-07 DIAGNOSIS — E78.5 HYPERLIPIDEMIA LDL GOAL <130: Chronic | ICD-10-CM

## 2021-11-10 DIAGNOSIS — E78.5 HYPERLIPIDEMIA LDL GOAL <130: Chronic | ICD-10-CM

## 2021-11-10 DIAGNOSIS — J45.40 MODERATE PERSISTENT ASTHMA WITHOUT COMPLICATION: Chronic | ICD-10-CM

## 2021-11-10 DIAGNOSIS — G25.81 RESTLESS LEGS SYNDROME (RLS): ICD-10-CM

## 2021-11-10 RX ORDER — MONTELUKAST SODIUM 10 MG/1
TABLET ORAL
Qty: 90 TABLET | Refills: 0 | Status: SHIPPED | OUTPATIENT
Start: 2021-11-10 | End: 2021-11-11

## 2021-11-10 RX ORDER — ROPINIROLE 1 MG/1
TABLET, FILM COATED ORAL
Qty: 90 TABLET | Refills: 0 | Status: SHIPPED | OUTPATIENT
Start: 2021-11-10 | End: 2021-11-11

## 2021-11-10 RX ORDER — ATORVASTATIN CALCIUM 80 MG/1
TABLET, FILM COATED ORAL
Qty: 90 TABLET | Refills: 0 | Status: SHIPPED | OUTPATIENT
Start: 2021-11-10 | End: 2021-11-11

## 2021-11-10 NOTE — TELEPHONE ENCOUNTER
Has upcoming appointment with PCP   Med refilled. Further refills to be addressed by PCP - must keep appointment as scheduled

## 2021-11-11 RX ORDER — MONTELUKAST SODIUM 10 MG/1
TABLET ORAL
Qty: 90 TABLET | Refills: 0 | Status: SHIPPED | OUTPATIENT
Start: 2021-11-11 | End: 2021-12-15

## 2021-11-11 RX ORDER — ROPINIROLE 1 MG/1
TABLET, FILM COATED ORAL
Qty: 90 TABLET | Refills: 0 | Status: SHIPPED | OUTPATIENT
Start: 2021-11-11 | End: 2021-12-15

## 2021-11-11 RX ORDER — ATORVASTATIN CALCIUM 80 MG/1
TABLET, FILM COATED ORAL
Qty: 90 TABLET | Refills: 0 | Status: SHIPPED | OUTPATIENT
Start: 2021-11-11 | End: 2021-12-15

## 2021-11-11 NOTE — CONFIDENTIAL NOTE
Prescriptions approved per [facility] Refill Protocol.  Montelukast  Atorvastatin      Routing refill request to provider for review/approval because:  Labs not current:  CBC      Rhina Kincaid RN  [facility]

## 2021-12-01 ENCOUNTER — TELEPHONE (OUTPATIENT)
Dept: SLEEP MEDICINE | Facility: CLINIC | Age: 56
End: 2021-12-01
Payer: COMMERCIAL

## 2021-12-01 DIAGNOSIS — G47.33 OSA (OBSTRUCTIVE SLEEP APNEA): Primary | ICD-10-CM

## 2021-12-01 NOTE — TELEPHONE ENCOUNTER
Reason for call:  Other   Patient called regarding (reason for call): prescription    Additional comments:   Patient needs CPAP supplies.  Please add to chart. Please call patient either way.    Phone number to reach patient:  Cell number on file:    Telephone Information:   Mobile [phone]       Best Time:  any    Can we leave a detailed message on this number?  YES    Travel screening: Not Applicable

## 2021-12-06 NOTE — TELEPHONE ENCOUNTER
Pt asking if provider can write order for new supplies and he will schedule follow up at later time.    NAILA Morin

## 2021-12-12 NOTE — PATIENT INSTRUCTIONS
Preventive Health Recommendations  Male Ages 50 - 64    Yearly exam:             See your health care provider every year in order to  o   Review health changes.   o   Discuss preventive care.    o   Review your medicines if your doctor has prescribed any.     Have a cholesterol test every 5 years, or more frequently if you are at risk for high cholesterol/heart disease.     Have a diabetes test (fasting glucose) every three years. If you are at risk for diabetes, you should have this test more often.     Have a colonoscopy at age 50, or have a yearly FIT test (stool test). These exams will check for colon cancer.      Talk with your health care provider about whether or not a prostate cancer screening test (PSA) is right for you.    You should be tested each year for STDs (sexually transmitted diseases), if you re at risk.     Shots: Get a flu shot each year. Get a tetanus shot every 10 years.     Nutrition:    Eat at least 5 servings of fruits and vegetables daily.     Eat whole-grain bread, whole-wheat pasta and brown rice instead of white grains and rice.     Get adequate Calcium and Vitamin D.     Lifestyle    Exercise for at least 150 minutes a week (30 minutes a day, 5 days a week). This will help you control your weight and prevent disease.     Limit alcohol to one drink per day.     No smoking.     Wear sunscreen to prevent skin cancer.     See your dentist every six months for an exam and cleaning.     See your eye doctor every 1 to 2 years.    These medications historically have been very expensive, especially as brand-name prescriptions - insurances were not very keen on covering them.  But now these medications have gone generic and insurances have started providing varying levels of coverage.  So we can start by sending in a prescription to the pharmacy and see how your insurance handles this.  If they do not cover the prescription we have some options that can make this significantly cheaper and  "easier.    1) GoodRx   - this is not a specific prescription but will compare prices of local pharmacies and checks for coupons, etc.  - can be significant savings, even if insurance does not \"cover\" the medication  - website or very convenient buddy    2) [employer], [facility], [employer], etc.  - online prescription services that take advantage of the cheaper generic medications.  Consultation is done through their website and they will mail you the prescription.  - various price points depending upon which medication, quantity, and dosage.         "

## 2021-12-12 NOTE — PROGRESS NOTES
SUBJECTIVE:   CC: Juan Treadwell is an 56 year old male who presents for preventative health visit.       Patient has been advised of split billing requirements and indicates understanding: Yes  Healthy Habits:     Getting at least 3 servings of Calcium per day:  Yes    Bi-annual eye exam:  Yes    Dental care twice a year:  Yes    Sleep apnea or symptoms of sleep apnea:  Sleep apnea    Diet:  Regular (no restrictions)    Frequency of exercise:  4-5 days/week    Duration of exercise:  30-45 minutes    Taking medications regularly:  Yes    Medication side effects:  Not applicable    PHQ-2 Total Score: 0    Additional concerns today:  No      Questions regarding ropinirole     Today's PHQ-2 Score:   PHQ-2 ( 1999 Pfizer) 12/15/2021   Q1: Little interest or pleasure in doing things 0   Q2: Feeling down, depressed or hopeless 0   PHQ-2 Score 0   PHQ-2 Total Score (12-17 Years)- Positive if 3 or more points; Administer PHQ-A if positive -   Q1: Little interest or pleasure in doing things Not at all   Q2: Feeling down, depressed or hopeless Not at all   PHQ-2 Score 0       Abuse: Current or Past(Physical, Sexual or Emotional)- No  Do you feel safe in your environment? Yes    Have you ever done Advance Care Planning? (For example, a Health Directive, POLST, or a discussion with a medical provider or your loved ones about your wishes): No, advance care planning information given to patient to review.  Patient plans to discuss their wishes with loved ones or provider.      Social History     Tobacco Use     Smoking status: Never Smoker     Smokeless tobacco: Never Used     Tobacco comment: no 2nd hand smoke exposure   Substance Use Topics     Alcohol use: Yes     Alcohol/week: 3.0 - 4.0 standard drinks     Comment: 3-4 drinks weekly     If you drink alcohol do you typically have >3 drinks per day or >7 drinks per week? No    Alcohol Use 12/15/2021   Prescreen: >3 drinks/day or >7 drinks/week? No   Prescreen: >3 drinks/day or  >7 drinks/week? -   No flowsheet data found.    Last PSA:   PSA   Date Value Ref Range Status   12/11/2020 2.49 0 - 4 ug/L Final     Comment:     Assay Method:  Chemiluminescence using Siemens Vista analyzer       Reviewed orders with patient. Reviewed health maintenance and updated orders accordingly - Yes  Lab work is in process  Labs reviewed in EPIC  BP Readings from Last 3 Encounters:   12/15/21 122/76   10/28/21 129/76   08/20/21 117/79    Wt Readings from Last 3 Encounters:   12/15/21 101.9 kg (224 lb 11.2 oz)   10/28/21 98 kg (216 lb)   08/20/21 106.9 kg (235 lb 9.6 oz)                    Reviewed and updated as needed this visit by clinical staff  Tobacco  Allergies  Meds  Problems  Med Hx  Surg Hx  Fam Hx  Soc Hx         Reviewed and updated as needed this visit by Provider  Tobacco  Allergies  Meds  Problems  Med Hx  Surg Hx  Fam Hx        Here for routine checkup and follow-up on chronic stable issues.  Having a little bit of plantar fasciitis right.  Also notes that the Requip seems to work better when he takes 2 tablets.    Review of Systems   Constitutional: Negative for chills and fever.   HENT: Negative for congestion, ear pain, hearing loss and sore throat.    Eyes: Negative for pain and visual disturbance.   Respiratory: Negative for cough and shortness of breath.    Cardiovascular: Negative for chest pain, palpitations and peripheral edema.   Gastrointestinal: Positive for heartburn. Negative for abdominal pain, constipation, diarrhea, hematochezia and nausea.   Genitourinary: Positive for frequency, impotence and urgency. Negative for dysuria, genital sores, hematuria and penile discharge.   Musculoskeletal: Positive for arthralgias. Negative for joint swelling and myalgias.   Skin: Negative for rash.   Neurological: Negative for dizziness, weakness, headaches and paresthesias.   Psychiatric/Behavioral: Negative for mood changes. The patient is not nervous/anxious.   "      OBJECTIVE:   /76   Pulse 77   Temp 97.8  F (36.6  C) (Tympanic)   Resp 16   Ht 1.772 m (5' 9.75\")   Wt 101.9 kg (224 lb 11.2 oz)   SpO2 95%   BMI 32.47 kg/m      Physical Exam  GENERAL: healthy, alert and no distress  EYES: Eyes grossly normal to inspection, PERRL and conjunctivae and sclerae normal  HENT: ear canals and TM's normal, nose and mouth without ulcers or lesions  NECK: no adenopathy, no asymmetry, masses, or scars and thyroid normal to palpation  RESP: lungs clear to auscultation - no rales, rhonchi or wheezes  CV: regular rate and rhythm, normal S1 S2, no S3 or S4, no murmur, click or rub, no peripheral edema and peripheral pulses strong  ABDOMEN: soft, nontender, no hepatosplenomegaly, no masses and bowel sounds normal  MS: no gross musculoskeletal defects noted, no edema  SKIN: no suspicious lesions or rashes  NEURO: Normal strength and tone, mentation intact and speech normal  PSYCH: mentation appears normal, affect normal/bright    Diagnostic Test Results:  Labs reviewed in Epic    ASSESSMENT/PLAN:   (Z00.00) Routine general medical examination at a health care facility  (primary encounter diagnosis)  Comment: Routine health maintenance up-to-date otherwise  Plan:     (E78.5) Hyperlipidemia LDL goal <130  Comment: Recheck and adjust as needed  Plan: atorvastatin (LIPITOR) 80 MG tablet,         Comprehensive metabolic panel, Lipid panel         reflex to direct LDL Non-fasting            (K21.9) Gastroesophageal reflux disease without esophagitis  Comment: Stable on current regimen.  Continue same plan and routine follow-up.   Plan: famotidine (PEPCID) 40 MG tablet            (J45.40) Moderate persistent asthma without complication  Comment: Stable on current regimen.  Continue same plan and routine follow-up.   Plan: montelukast (SINGULAIR) 10 MG tablet            (G25.81) Restless legs syndrome (RLS)  Comment: We will increase to 2 mg  Plan: rOPINIRole (REQUIP) 2 MG tablet        " "    (E29.1) Male hypogonadism  Comment: Has not really been bothered by low testosterone symptoms much and last time we looked was 5 years ago.  Still some erectile dysfunction.  Plan: Testosterone total            (Z12.5) Prostate cancer screening  Comment:   Plan: Prostate Specific Antigen Screen            (Z23) High priority for 2019-nCoV vaccine  Comment:   Plan: COVID-19,PF,PFIZER (12+ Yrs PURPLE LABEL)              Patient has been advised of split billing requirements and indicates understanding: Yes  COUNSELING:   Reviewed preventive health counseling, as reflected in patient instructions       Regular exercise       Healthy diet/nutrition       Vision screening       Colon cancer screening       Prostate cancer screening    Estimated body mass index is 32.47 kg/m  as calculated from the following:    Height as of this encounter: 1.772 m (5' 9.75\").    Weight as of this encounter: 101.9 kg (224 lb 11.2 oz).     Weight management plan: Discussed healthy diet and exercise guidelines    He reports that he has never smoked. He has never used smokeless tobacco.      Counseling Resources:  ATP IV Guidelines  Pooled Cohorts Equation Calculator  FRAX Risk Assessment  ICSI Preventive Guidelines  Dietary Guidelines for Americans, 2010  [email or web address] MyPlate  ASA Prophylaxis  Lung CA Screening    Kelli Porter MD  [facility]  "

## 2021-12-15 ENCOUNTER — OFFICE VISIT (OUTPATIENT)
Dept: FAMILY MEDICINE | Facility: CLINIC | Age: 56
End: 2021-12-15
Payer: COMMERCIAL

## 2021-12-15 VITALS
HEART RATE: 77 BPM | BODY MASS INDEX: 32.17 KG/M2 | RESPIRATION RATE: 16 BRPM | WEIGHT: 224.7 LBS | SYSTOLIC BLOOD PRESSURE: 122 MMHG | TEMPERATURE: 97.8 F | HEIGHT: 70 IN | DIASTOLIC BLOOD PRESSURE: 76 MMHG | OXYGEN SATURATION: 95 %

## 2021-12-15 DIAGNOSIS — E78.5 HYPERLIPIDEMIA LDL GOAL <130: Chronic | ICD-10-CM

## 2021-12-15 DIAGNOSIS — G25.81 RESTLESS LEGS SYNDROME (RLS): ICD-10-CM

## 2021-12-15 DIAGNOSIS — Z23 HIGH PRIORITY FOR 2019-NCOV VACCINE: ICD-10-CM

## 2021-12-15 DIAGNOSIS — E29.1 MALE HYPOGONADISM: ICD-10-CM

## 2021-12-15 DIAGNOSIS — Z00.00 ROUTINE GENERAL MEDICAL EXAMINATION AT A HEALTH CARE FACILITY: Primary | ICD-10-CM

## 2021-12-15 DIAGNOSIS — Z12.5 PROSTATE CANCER SCREENING: ICD-10-CM

## 2021-12-15 DIAGNOSIS — J45.40 MODERATE PERSISTENT ASTHMA WITHOUT COMPLICATION: Chronic | ICD-10-CM

## 2021-12-15 DIAGNOSIS — K21.9 GASTROESOPHAGEAL REFLUX DISEASE WITHOUT ESOPHAGITIS: ICD-10-CM

## 2021-12-15 LAB
ALBUMIN SERPL-MCNC: 4.2 G/DL (ref 3.4–5)
ALP SERPL-CCNC: 125 U/L (ref 40–150)
ALT SERPL W P-5'-P-CCNC: 38 U/L (ref 0–70)
ANION GAP SERPL CALCULATED.3IONS-SCNC: 5 MMOL/L (ref 3–14)
AST SERPL W P-5'-P-CCNC: 17 U/L (ref 0–45)
BILIRUB SERPL-MCNC: 0.3 MG/DL (ref 0.2–1.3)
BUN SERPL-MCNC: 16 MG/DL (ref 7–30)
CALCIUM SERPL-MCNC: 9.7 MG/DL (ref 8.5–10.1)
CHLORIDE BLD-SCNC: 104 MMOL/L (ref 94–109)
CHOLEST SERPL-MCNC: 157 MG/DL
CO2 SERPL-SCNC: 30 MMOL/L (ref 20–32)
CREAT SERPL-MCNC: 1.07 MG/DL (ref 0.66–1.25)
FASTING STATUS PATIENT QL REPORTED: YES
GFR SERPL CREATININE-BSD FRML MDRD: 78 ML/MIN/1.73M2
GLUCOSE BLD-MCNC: 97 MG/DL (ref 70–99)
HDLC SERPL-MCNC: 57 MG/DL
LDLC SERPL CALC-MCNC: 49 MG/DL
NONHDLC SERPL-MCNC: 100 MG/DL
POTASSIUM BLD-SCNC: 4.4 MMOL/L (ref 3.4–5.3)
PROT SERPL-MCNC: 7.7 G/DL (ref 6.8–8.8)
PSA SERPL-MCNC: 2.64 UG/L (ref 0–4)
SODIUM SERPL-SCNC: 139 MMOL/L (ref 133–144)
TRIGL SERPL-MCNC: 253 MG/DL

## 2021-12-15 PROCEDURE — 99213 OFFICE O/P EST LOW 20 MIN: CPT | Mod: 25 | Performed by: FAMILY MEDICINE

## 2021-12-15 PROCEDURE — 80061 LIPID PANEL: CPT | Performed by: FAMILY MEDICINE

## 2021-12-15 PROCEDURE — 80053 COMPREHEN METABOLIC PANEL: CPT | Performed by: FAMILY MEDICINE

## 2021-12-15 PROCEDURE — 0003A COVID-19,PF,PFIZER (12+ YRS): CPT | Performed by: FAMILY MEDICINE

## 2021-12-15 PROCEDURE — G0103 PSA SCREENING: HCPCS | Performed by: FAMILY MEDICINE

## 2021-12-15 PROCEDURE — 99396 PREV VISIT EST AGE 40-64: CPT | Performed by: FAMILY MEDICINE

## 2021-12-15 PROCEDURE — 91300 COVID-19,PF,PFIZER (12+ YRS): CPT | Performed by: FAMILY MEDICINE

## 2021-12-15 PROCEDURE — 36415 COLL VENOUS BLD VENIPUNCTURE: CPT | Performed by: FAMILY MEDICINE

## 2021-12-15 PROCEDURE — 84403 ASSAY OF TOTAL TESTOSTERONE: CPT | Performed by: FAMILY MEDICINE

## 2021-12-15 RX ORDER — ATORVASTATIN CALCIUM 80 MG/1
80 TABLET, FILM COATED ORAL DAILY
Qty: 90 TABLET | Refills: 2 | Status: SHIPPED | OUTPATIENT
Start: 2021-12-15 | End: 2022-11-01

## 2021-12-15 RX ORDER — MONTELUKAST SODIUM 10 MG/1
1 TABLET ORAL AT BEDTIME
Qty: 90 TABLET | Refills: 2 | Status: SHIPPED | OUTPATIENT
Start: 2021-12-15 | End: 2022-11-01

## 2021-12-15 RX ORDER — ROPINIROLE 2 MG/1
2 TABLET, FILM COATED ORAL AT BEDTIME
Qty: 90 TABLET | Refills: 2 | Status: SHIPPED | OUTPATIENT
Start: 2021-12-15 | End: 2022-08-16

## 2021-12-15 RX ORDER — FAMOTIDINE 40 MG/1
40 TABLET, FILM COATED ORAL DAILY
Qty: 90 TABLET | Refills: 3 | Status: SHIPPED | OUTPATIENT
Start: 2021-12-15 | End: 2023-04-18

## 2021-12-15 ASSESSMENT — ENCOUNTER SYMPTOMS
MYALGIAS: 0
PALPITATIONS: 0
ARTHRALGIAS: 1
SORE THROAT: 0
HEADACHES: 0
SHORTNESS OF BREATH: 0
FREQUENCY: 1
NAUSEA: 0
HEMATURIA: 0
DIZZINESS: 0
DIARRHEA: 0
HEARTBURN: 1
ABDOMINAL PAIN: 0
FEVER: 0
EYE PAIN: 0
PARESTHESIAS: 0
CONSTIPATION: 0
COUGH: 0
JOINT SWELLING: 0
DYSURIA: 0
NERVOUS/ANXIOUS: 0
CHILLS: 0
WEAKNESS: 0
HEMATOCHEZIA: 0

## 2021-12-15 ASSESSMENT — MIFFLIN-ST. JEOR: SCORE: 1856.51

## 2021-12-17 LAB — TESTOST SERPL-MCNC: 112 NG/DL (ref 240–950)

## 2021-12-20 NOTE — RESULT ENCOUNTER NOTE
Juan,  Well, that testosterone is, indeed, low.  But I know you said treating in the past really did not seem to change symptoms that much for you.  So we are certainly not obligated to do anything.  However sometimes low testosterone can contribute to erectile dysfunction.  So treating it is certainly an option.  Give it some thought and let me know anytime.  The other tests all look very good.  JENNI Porter M.D.

## 2022-01-05 DIAGNOSIS — N52.9 ERECTILE DYSFUNCTION, UNSPECIFIED ERECTILE DYSFUNCTION TYPE: Chronic | ICD-10-CM

## 2022-01-07 RX ORDER — TADALAFIL 5 MG/1
TABLET ORAL
Qty: 90 TABLET | Refills: 0 | Status: SHIPPED | OUTPATIENT
Start: 2022-01-07 | End: 2022-03-24

## 2022-06-10 DIAGNOSIS — N52.9 ERECTILE DYSFUNCTION, UNSPECIFIED ERECTILE DYSFUNCTION TYPE: Chronic | ICD-10-CM

## 2022-06-10 RX ORDER — TADALAFIL 5 MG/1
TABLET ORAL
Qty: 90 TABLET | Refills: 0 | Status: SHIPPED | OUTPATIENT
Start: 2022-06-10 | End: 2022-08-24

## 2022-06-22 ENCOUNTER — TELEPHONE (OUTPATIENT)
Dept: SURGERY | Facility: CLINIC | Age: 57
End: 2022-06-22

## 2022-06-22 NOTE — TELEPHONE ENCOUNTER
Spoke to patient reminding them to fill out the new patient questionnaire before their appointment.    Radha LAWRENCE MA

## 2022-06-23 ENCOUNTER — VIRTUAL VISIT (OUTPATIENT)
Dept: SURGERY | Facility: CLINIC | Age: 57
End: 2022-06-23
Payer: COMMERCIAL

## 2022-06-23 ENCOUNTER — TELEPHONE (OUTPATIENT)
Dept: SURGERY | Facility: CLINIC | Age: 57
End: 2022-06-23

## 2022-06-23 VITALS — WEIGHT: 227 LBS | HEIGHT: 69 IN | BODY MASS INDEX: 33.62 KG/M2

## 2022-06-23 DIAGNOSIS — J45.40 MODERATE PERSISTENT ASTHMA WITHOUT COMPLICATION: Chronic | ICD-10-CM

## 2022-06-23 DIAGNOSIS — Z13.0 SCREENING FOR ENDOCRINE, NUTRITIONAL, METABOLIC AND IMMUNITY DISORDER: ICD-10-CM

## 2022-06-23 DIAGNOSIS — Z13.29 SCREENING FOR ENDOCRINE, NUTRITIONAL, METABOLIC AND IMMUNITY DISORDER: ICD-10-CM

## 2022-06-23 DIAGNOSIS — E66.811 CLASS 1 OBESITY WITH SERIOUS COMORBIDITY IN ADULT, UNSPECIFIED BMI, UNSPECIFIED OBESITY TYPE: Primary | ICD-10-CM

## 2022-06-23 DIAGNOSIS — Z13.228 SCREENING FOR ENDOCRINE, NUTRITIONAL, METABOLIC AND IMMUNITY DISORDER: ICD-10-CM

## 2022-06-23 DIAGNOSIS — Z13.21 SCREENING FOR ENDOCRINE, NUTRITIONAL, METABOLIC AND IMMUNITY DISORDER: ICD-10-CM

## 2022-06-23 DIAGNOSIS — G47.33 OSA (OBSTRUCTIVE SLEEP APNEA): Chronic | ICD-10-CM

## 2022-06-23 DIAGNOSIS — K21.9 GASTROESOPHAGEAL REFLUX DISEASE WITHOUT ESOPHAGITIS: Chronic | ICD-10-CM

## 2022-06-23 PROCEDURE — 99205 OFFICE O/P NEW HI 60 MIN: CPT | Mod: 95 | Performed by: PHYSICIAN ASSISTANT

## 2022-06-23 PROCEDURE — 97802 MEDICAL NUTRITION INDIV IN: CPT | Mod: 95 | Performed by: DIETITIAN, REGISTERED

## 2022-06-23 RX ORDER — PHENTERMINE HYDROCHLORIDE 15 MG/1
15 CAPSULE ORAL EVERY MORNING
Qty: 60 CAPSULE | Refills: 0 | Status: SHIPPED | OUTPATIENT
Start: 2022-06-23 | End: 2022-12-22

## 2022-06-23 NOTE — TELEPHONE ENCOUNTER
Prior Authorization Retail Medication Request    Medication/Dose: Phentermine hcl 15 mg capsules    ICD code (if different than what is on RX):    Previously Tried and Failed:    Rationale:      Insurance Name:  alessia saini  Insurance ID:  [id number]       Pharmacy Information (if different than what is on RX)  Name:  [facility]   Phone:  [phone]

## 2022-06-23 NOTE — PATIENT INSTRUCTIONS
It was great meeting you today Juan!  Please get your lab drawn.  Please also call your insurance to see if they cover Saxenda (liraglutide) for weight loss.      Follow up in 6 weeks    Micha FLOWER      Below is information about the medication we discussed. DO NOT TAKE WITH CIALIS.    Please make a nurse only appointment at the closest [facility] to have blood pressure and pulse checked 10 days after starting the phentermine and let clinic know if blood pressure goes above 140/90 or pulse greater than 100 bpm.        Plan:  Increase water to 32 oz daily - Start with 10 oz first thing in the AM  Have breakfast daily  3 walking for cardio goal            Information about the Weight Loss Medication Phentermine      Phentermine is a stimulant medication related to the amphetamine class of medication but with a lower risk of dependence and addiction.  It is used for weight loss by suppressing the appetite region of the brain.  It also may speed up the metabolic rate and give a person more energy.  Like any medication there are potential side effects and the most common are:    Dry mouth occurs in almost everyone (hydrate well), fewer people experience Palpatations, fast heart rate, elevation of blood pressure, restlessness, insomnia, dizziness, change in mood, tremor, headache, changes in bowel movements,itchiness, changes in sex drive.    Take one tablet in the morning. Contact the nurse via [employer] or call [phone] if you have any questions or concerns. (Do not stop taking it if you don't think it's working. For some people it works without them knowing it.)    Phentermine is being prescribed because you identified hunger as one of the main causes for your extra weight.      Our patients on Phentermine find that they:    >feel less hunger    >find it easier to push the plate away   >have an easier time eating less    For some of our patients, these feelings are very real and immediate. For other patients,  the feelings are less obvious. They don't feel much of a change but find they've lost weight. Like all weight loss medications, Phentermine  works best when you help it work. This means:  1. Having less tempting high calorie (fattening) food around the house or office. (For people with strong cravings this is very important.)   2. Staying away from situations or people that may trigger your cravings .   3. Eating out only one time or less each week.  4. Eating your meals at a table with the TV or computer off.          In order to get refills of this or any medication we prescribe you must be seen in the medical weight mgmt clinic every 2-3 months.                                      Protein Sources:  Protein Sources for Weight Loss  [email or web address]       /

## 2022-06-23 NOTE — PROGRESS NOTES
STEPHANI is a 56 year old who is being evaluated via a billable video visit.      How would you like to obtain your AVS? [place]  If the video visit is dropped, the invitation should be resent by: Text to cell phone: [phone]  Will anyone else be joining your video visit? No        Video-Visit Details    Video Start Time: 10:32am    Type of service:  Video Visit    Video End Time:11:03am    Originating Location (pt. Location): Home    Distant Location (provider location): Provider Remote Setting    Platform used for Video Visit: [facility] WEIGHT LOSS INITIAL EVALUATION  DIAGNOSIS:  Obesity Class I    NUTRITION HISTORY:  Diet and exercise history per pre-visit questionnaire as follows:    Do you typically eat breakfast? No   If you do eat breakfast, what types of food do you eat? Eggs or hashbrowns, toast   Do you typically eat lunch? Yes   If you do eat lunch, what types of food do you typically eat?  Salads, chicken breast, soup, leftovers   Do you typically eat supper? Yes   If you do eat supper, what types of food do you typically eat? Chicken, steak, pork, usually a veggie, rice, beans   Do you typically eat snacks? Yes   If you do snack, what types of food do you typically eat? Chips or fruit   Do you like vegetables?  Yes   Do you drink water? No   How many glasses of juice do you drink in a typical day?    How many of glasses of milk do you drink in a typical day? 0   If you do drink milk, what type? 1%   How many 8oz glasses of sugar containing drinks such as Joselito-Aid/sweet tea do you drink in a day? 0   How many cans/bottles of sugar pop/soda/tea/sports drinks do you drink in a day? 0   How many cans/bottles of diet pop/soda/tea or sports drink do you drink in a day? 5   How often do you have a drink of alcohol? 4 or More Times a Week   If you do drink, how many drinks might you have in a day? 1 or 2   Please answer these questions based on a typical 12 hour day including time at work and home.  "   How much of a typical 12 hour day do you spend sitting? Half the Day   How much of a typical 12 hour day do you spend lying down? Less Than Half the Day   How much of a typical day do you spend walking/standing? Half the Day   How many hours (not including work) do you spend on the TV/Video Games/Computer/Tablet/Phone? 2-3 Hours   How many times a week are you active for the purpose of exercise? 2-3 Times a Week   What keeps you from being more active? Lack of Time    Unsure What To Do   How many total minutes do you spend doing some activity for the purpose of exercising when you exercise? More Than 30 Minutes       Other: Pt alerted this writer 20 minutes into visit that he was unable to see video. Completed standard education to best of abilities, verbally. Recommend review plate method with food models at next visit.     ANTHROPOMETRICS:  Height: 5' 9\"   Weight: 227 lbs 0 oz   BMI:  33.52 kg/m2  NUTRITION DIAGNOSIS:   Obese, class I related to excess energy intake as evidence by BMI of  33.52 kg/m2  NUTRITION INTERVENTIONS  Nutrition Prescription:  Recommend modified energy- nutrient intake  Implementation:  Nutrition Education (Content):    Discussed portion sizes and plate method    Provided handouts: Protein Sources for Weight Loss, Fiber Content in Food, Plate Method    Nutrition Education (Application):     Patient to practice goals as stated below    Patient verbalizes understanding of diet by stating he will eat meals at regular intervals    Anticipate good compliance    Goals:  Increase water intake to 64oz per day  Eat balanced meals at regular intervals    FOLLOW UP AND MONITORING:    Other  - follow up in 4-8 weeks.     TIME SPENT WITH PATIENT:   31 minutes     Vaishnavi Vargas RD, LD  Clinical Dietitian  "

## 2022-06-23 NOTE — PATIENT INSTRUCTIONS
"Pan Martinez!      It was great meeting with you today! Since you weren't able to see the video today, we'll plan on reviewing the visual portion of everything st your next visit. Here are some links to the handouts I referenced - I would take a close look at the \"My Plate\" link, as this will provide details on much of what we will demonstrate.         Protein Sources:  [email or web address]     Fiber Sources:  [email or web address]     My Plate:  https://www.bMenumyplate.gov/        A helpful search term to type into [employer], [employer], etc is \"myplate examples.\"     Key points from today:  Eat 3 meals/day at consistent intervals  Shoot for 60-90g protein (20-30g/meal)  Aim for 25-35g fiber (5-10g/meal)  Eat slowly: 20-30 minutes per meal     Here is a summary of the goals that we discussed:     1. Increase water intake  - Aim for 64oz of fluid each day     2. Eat balanced meals at consistent intervals  - Have your first meal of the day within 1 hour of waking up, then eat a meal every 4-6 hours     3. Breakfast ideas:  - Proteins: eggs, yogurt, low-fat cheese sticks, deli meat, cottage cheese, protein bars/shakes, [place]ahl waffles or pancakes (high protein)  - Fiber: fruits, vegetables, whole wheat toast, high-fiber tortillas  - \"Quick\" breakfast ideas:  2 hard boiled eggs + fruit + a high fiber tortilla ([place] \"Carb Balance\" is a great option)  Deli turkey + 1 stick of low-fat string cheese + fruit  Protein shake or bar + fruit  Yogurt + fruit + high fiber cereal (ie Kashi or Fiber One)  Cottage cheese + cherry tomatoes + a few whole wheat crackers (ie Triscuits)  2 eggs + 1 slice low-fat cheese on a whole wheat english muffin     4. Per Rennye - if you take your Cialis in the evenings, please take your Phentermine in the morning. Do not take these two medications at the same time.      Let's plan on following up in 1-2 months. This can be scheduled via our call center at . Of course, " reach out sooner if you have any questions or concerns. Have a great week and welcome to the program!        Vaishnavi Vargas RD, LD?  Clinical Dietitian

## 2022-06-23 NOTE — PROGRESS NOTES
"STEPHANI is a 56 year old who is being evaluated via a billable video visit.      If the video visit is dropped, the invitation should be resent by: Text to cell phone: [phone]  Will anyone else be joining your video visit? No      Video-Visit Details    Type of service:  Video Visit    Video Start Time:  9:02    Video End Time: 9:50     60 minutes spent on the date of the encounter doing chart review, review of test results, patient visit and documentation       Originating Location (pt. Location): Home    Distant Location (provider location):  [facility] [place]     Platform used for Video Visit: [facility]            PATIENT:  Stephani Treadwell  MRN:         [id number]  :         1965  MAURICIO:         2022        Dear Kelli Porter MD,    I had the pleasure of seeing your patient, Stephani Treadwell. Full intake/assessment was done to determine barriers to weight loss success and develop a treatment plan. Stephani Treadwell is a 56 year old male interested in treatment of medical problems associated with excess weight. He has a height of 5' 9\"[pt reported[, a weight of 227 lbs 0 oz, and the calculated Body mass index is 33.52 kg/m .     Has dealt with weight issues for years.  Has lost 10-15 lbs using different programs but anything after that is tough.  Weight watchers was very successful the first time he did the program but not with subsequent attempts.          ASSESSMENT/PLAN:  Class 1 severe obesity due to excess calories with Body Mass Index (BMI) of 33  Asthma  GERD without esophagitis  DIAMOND      Plan:  Increase water to 32 oz daily - Start with 10 oz first thing in the AM  Have breakfast daily  3 walking for cardio goal        Discussed the 24 week program.     We discussed the role of pharmacological agents in the treatment of obesity and the \"off-label\" use of medications in this practice. We discussed the risks " "and benefits of each. We discussed indications, contraindications, potential side effects, and estimated costs of each. Discussed that medications must always be used together with lifestyle changes such as improvements in diet choices, portion control and establishing and maintaining a regular exercise program.     Taken naltrexone and bupropion - did lose weight initially maybe 10-12 lbs. But did not make long term lifestyle changes and gained weight back. Tolerated the medications well.      Discussed starting phentermine. Side effects reviewed and patient information provided.  Patient advised not to take with cialis.    Patient has agreed to have their blood pressure and pulse checked 7-10 days after starting and will let clinic know if blood pressure goes above 140/90 or pulse greater than 100 bpm.  Reviewed recent BP and pulse readings.     Follow up in 6 weeks     All of patients questions about the weight loss program were answered fully.        He has the following co-morbidities:       6/22/2022   I have the following health issues associated with obesity: High Cholesterol, Sleep Apnea, GERD (Reflux)   I have the following symptoms associated with obesity: Knee Pain, Back Pain, Hip Pain       Patient Goals 6/22/2022   I am interested in having a healthier weight to diminish current health problems: Yes   I am interested in having a healthier weight in order to prevent future health problems: Yes   I am interested in having a healthier weight in order to have a future surgery: No     Uses CPAP nightly     Referring Provider 6/22/2022   Please name the provider who referred you to Medical Weight Management.  If you do not know, please answer: \"I Don't Know\". Marco A Porter       Weight History 6/22/2022   How concerned are you about your weight? Somewhat Concerned   Would you describe your weight gain as gradual? Yes   I became overweight: As an Adult   The following factors have contributed to my weight " gain:  Eating Wrong Types of Food, Eating Too Much, Lack of Exercise, Other   Please list the other factors.  Injuries to back and shoulders   I have tried the following methods to lose weight: Exercise, Weight Watchers, Medications   My lowest weight since age 19 was: 170   My highest weight since age 19 was: 235   The most weight I have ever lost was: (lbs) 30   I have the following family history of obesity/being overweight:  One or more of my siblings are overweight, Many of my relatives are overweight   Has anyone in your family had weight loss surgery? No   How has your weight changed over the last year?  Gained   How many pounds? 20     Most days when working from home 2-3 days per week will have breakfast. When goes to the office does not eat breakfast  Diet Recall Review with Patient 6/22/2022   Do you typically eat breakfast? No   If you do eat breakfast, what types of food do you eat? Eggs or hashbrowns, toast. 42 oz diet coke for the day   Do you typically eat lunch? Yes   If you do eat lunch, what types of food do you typically eat?  Salads, chicken breast, soup, leftovers. Yesterday had a left over Pot Belly sandwich with ham, turkey with chips.  Diet coke    Do you typically eat supper? Yes   If you do eat supper, what types of food do you typically eat? Chicken, steak, pork, usually a veggie, rice, beans. Last night made pasta with chicken and sausage.  Yi bread and a salad.  Beer   Do you typically eat snacks? Yes   Several times daily   If you do snack, what types of food do you typically eat? Chips or fruit   Do you like vegetables?  Yes   Do you drink water? No   How many of glasses of milk do you drink in a typical day? 0   If you do drink milk, what type? 1%   How many 8oz glasses of sugar containing drinks such as Joselito-Aid/sweet tea do you drink in a day? 0   How many cans/bottles of sugar pop/soda/tea/sports drinks do you drink in a day? 0   How many cans/bottles of diet pop/soda/tea or  sports drink do you drink in a day? 5                 60 oz diet coke daily,    How often do you have a drink of alcohol? 4 or More Times a Week   - about 10 beers per week   If you do drink, how many drinks might you have in a day? 1 or 2       Eating Habits 6/22/2022   Generally, my meals include foods like these: bread, pasta, rice, potatoes, corn, crackers, sweet dessert, pop, or juice. A Few Times a Week   Generally, my meals include foods like these: fried meats, brats, burgers, french fries, pizza, cheese, chips, or ice cream. Once a Week   Eat fast food (like [employer], [employer], [employer]aco [employer]). Less Than Weekly   Eat at a buffet or sit-down restaurant. Less Than Weekly   Eat most of my meals in front of the TV or computer. Almost Everyday   Often skip meals, eat at random times, have no regular eating times. A Few Times a Week   Rarely sit down for a meal but snack or graze throughout.  A Few Times a Week   Eat extra snacks between meals. A Few Times a Week   Eat most of my food at the end of the day. A Few Times a Week   Eat in the middle of the night or wake up at night to eat. Never   Eat extra snacks to prevent or correct low blood sugar. Never   Eat to prevent acid reflux or stomach pain. Never   Worry about not having enough food to eat. Never   Have you been to the food shelf at least a few times this year? No   I eat when I am depressed. Never   I eat when I am stressed. Less Than Weekly   I eat when I am bored. A Few Times a Week   I eat when I am anxious. Never   I eat when I am happy or as a reward. Never   I feel hungry all the time even if I just have eaten. Never   Feeling full is important to me. Never   I finish all the food on my plate even if I am already full. Less Than Weekly   I can't resist eating delicious food or walk past the good food/smell. A Few Times a Week   I eat/snack without noticing that I am eating. Once a Week   I eat when I am preparing the meal. Never   I eat more than  usual when I see others eating. Never   I have trouble not eating sweets, ice cream, cookies, or chips if they are around the house. Once a Week   I think about food all day. Never   What foods, if any, do you crave? Chips/Crackers   Please list any other foods you crave? Ice cream       Amount of Food 6/22/2022   I make myself vomit what I have eaten or use laxatives to get rid of food. Never   I eat a large amount of food, like a loaf of bread, a box of cookies, a pint/quart of ice cream, all at once. Never   I eat a large amount of food even when I am not hungry. Monthly   I eat rapidly. Never   I eat alone because I feel embarrassed and do not want others to see how much I have eaten. Never   I eat until I am uncomfortably full. Monthly   I feel bad, disgusted, or guilty after I overeat. Never   I make myself vomit what I have eaten or use laxatives to get rid of food. Never       Activity/Exercise History 6/22/2022   How much of a typical 12 hour day do you spend sitting? Half the Day   How much of a typical 12 hour day do you spend lying down? Less Than Half the Day   How much of a typical day do you spend walking/standing? Half the Day   How many hours (not including work) do you spend on the TV/Video Games/Computer/Tablet/Phone? 2-3 Hours   How many times a week are you active for the purpose of exercise? 2-3 Times a Week   What keeps you from being more active? Lack of Time, Unsure What To Do   How many total minutes do you spend doing some activity for the purpose of exercising when you exercise? More Than 30 Minutes       He walks for about an hour or so    PAST MEDICAL HISTORY:  Past Medical History:   Diagnosis Date     Anal fistula      Uncomplicated asthma        Work/Social History Reviewed With Patient 6/22/2022   My employment status is: Full-Time   My job is: Business analysis (IT)   How much of your job is spent on the computer or phone? 75%   How many hours do you spend commuting to work daily?   < 30 minutes   What is your marital status? /In a Relationship   If in a relationship, is your significant other overweight? No   Do you have children? Yes   If you have children, are they overweight? No   Who do you live with?  My Wife and two of our three kids   Are they supportive of your health goals? Yes   Who does the food shopping?  My wife and I.       Mental Health History Reviewed With Patient 6/22/2022   Have you ever been physically or sexually abused? No   How often in the past 2 weeks have you felt little interest or pleasure in doing things? Not at all   Over the past 2 weeks how often have you felt down, depressed, or hopeless? Not at all       Sleep History Reviewed With Patient 6/22/2022   How many hours do you sleep at night? 5   Do you think that you snore loudly or has anybody ever heard you snore loudly (louder than talking or so loud it can be heard behind a shut door)? Yes   Has anyone seen or heard you stop breathing during your sleep? Yes   Do you often feel tired, fatigued, or sleepy during the day? Yes   Do you have a TV/Computer in your bedroom? No     ROS 6/23/2022  General  Fatigue: Does not get enough sleep - bit of a night owl.  Up until midnight or a bit later  Sleep Quality: OK  HEENT  Hx of glaucoma: No  Vision changes: No  Cardiovascular  Chest Pain with Exertion: No  Palpitations: No  Hx of heart disease: No - Reviewed EKG 2014 WNL  Hx of PVD No  Pulmonary  Shortness of breath at rest: No  Shortness of breath with exertion: No  Snoring: Has CPAP and uses regularly  Gastrointestinal  Heartburn: yes - takes pepcid prn  Abdominal pain: No  History of pancreatitis: No  Severe constipation: No  Hx of bowel obstruction: No  Gastrourinary  History of kidney stones: possibly a mild stone.  Not sure.  Went to urgent care many years ago  Psychiatric  Moods Stable: Yes  History of drug abuse: No  No history of eating disorder  Endocrine  Polydipsia: No  Polyuria: yes - feels like  "bladder doesn't fully empty  Personal or family history of thyroid cancer: No  Neurologic:  Hx of seizures: No  Hx of paresthesias: No        MEDICATIONS:   Current Outpatient Medications   Medication Sig Dispense Refill     albuterol (PROAIR HFA/PROVENTIL HFA/VENTOLIN HFA) 108 (90 Base) MCG/ACT inhaler Inhale 2 puffs into the lungs every 4 hours as needed for shortness of breath / dyspnea or wheezing 1 Inhaler 2     atorvastatin (LIPITOR) 80 MG tablet Take 1 tablet (80 mg) by mouth daily Profile Rx: patient will contact pharmacy when needed 90 tablet 2     Cholecalciferol (VITAMIN D) 2000 UNITS tablet Take 2,000 Units by mouth       COMPOUNDED NON-CONTROLLED SUBSTANCE (CMPD RX) - PHARMACY TO MIX COMPOUNDED MEDICATION Apply 20 mLs into each nare 2 times daily Gentamicin/Dexamethasone 160/120mg/liter saline 2000 mL 6     famotidine (PEPCID) 40 MG tablet Take 1 tablet (40 mg) by mouth daily 90 tablet 3     montelukast (SINGULAIR) 10 MG tablet Take 1 tablet (10 mg) by mouth At Bedtime 90 tablet 2     Multiple Vitamins-Minerals (MULTIVITAMIN OR)        order for DME Equipment being ordered: 1 cc syringe, with 23 gauge 1 inch needles  Use twice monthly 10 Units 5     order for DME Resmed Airsense 10 auto cpap 10-14 cm, Mirage Fx nasal mask std       rOPINIRole (REQUIP) 2 MG tablet Take 1 tablet (2 mg) by mouth At Bedtime 90 tablet 2     tadalafil (CIALIS) 5 MG tablet TAKE ONE TABLET BY MOUTH ONCE DAILY 90 tablet 0       ALLERGIES:   Allergies   Allergen Reactions     Codeine Nausea and Vomiting       PHYSICAL EXAM:  Ht 5' 9\" (1.753 m)   Wt 227 lb (103 kg)   BMI 33.52 kg/m    GENERAL: Healthy, alert and no distress  EYES: Eyes grossly normal to inspection.  No discharge or erythema, or obvious scleral/conjunctival abnormalities.  RESP: No audible wheeze, cough, or visible cyanosis.  No visible retractions or increased work of breathing.    SKIN: Visible skin clear. No significant rash, abnormal pigmentation or " lesions.  NEURO: Cranial nerves grossly intact.  Mentation and speech appropriate for age.  PSYCH: Mentation appears normal, affect normal/bright, judgement and insight intact, normal speech and appearance well-groomed.          Sincerely,    Micha Arroyo PA-C

## 2022-06-27 NOTE — TELEPHONE ENCOUNTER
Central Prior Authorization Team  Phone: [phone]    PA Initiation    Medication: Phentermine hcl 15 mg capsules  Insurance Company: [facility] - Phone [phone] Fax [phone]  Pharmacy Filling the Rx: [id number] - [place], [place] - [address] [address]  Filling Pharmacy Phone: [phone]  Filling Pharmacy Fax:    Start Date: 6/27/2022

## 2022-06-27 NOTE — TELEPHONE ENCOUNTER
Prior Authorization Approval    Authorization Effective Date: 6/27/2022  Authorization Expiration Date: 9/27/2022  Medication: Phentermine hcl 15 mg capsules- APPROVED   Approved Dose/Quantity:   Reference #:     Insurance Company: [employer] - Phone [phone] Fax [phone]  Expected CoPay:       CoPay Card Available:      Foundation Assistance Needed:    Which Pharmacy is filling the prescription (Not needed for infusion/clinic administered): [id number] - [place], [place] - [address] [address]  Pharmacy Notified: Yes  Patient Notified:  **Instructed pharmacy to notify patient when script is ready to /ship.**

## 2022-06-29 ENCOUNTER — LAB (OUTPATIENT)
Dept: LAB | Facility: CLINIC | Age: 57
End: 2022-06-29
Payer: COMMERCIAL

## 2022-06-29 DIAGNOSIS — Z13.228 SCREENING FOR ENDOCRINE, NUTRITIONAL, METABOLIC AND IMMUNITY DISORDER: ICD-10-CM

## 2022-06-29 DIAGNOSIS — Z13.29 SCREENING FOR ENDOCRINE, NUTRITIONAL, METABOLIC AND IMMUNITY DISORDER: ICD-10-CM

## 2022-06-29 DIAGNOSIS — Z13.0 SCREENING FOR ENDOCRINE, NUTRITIONAL, METABOLIC AND IMMUNITY DISORDER: ICD-10-CM

## 2022-06-29 DIAGNOSIS — Z13.21 SCREENING FOR ENDOCRINE, NUTRITIONAL, METABOLIC AND IMMUNITY DISORDER: ICD-10-CM

## 2022-06-29 DIAGNOSIS — E66.811 CLASS 1 OBESITY WITH SERIOUS COMORBIDITY IN ADULT, UNSPECIFIED BMI, UNSPECIFIED OBESITY TYPE: ICD-10-CM

## 2022-06-29 LAB — HBA1C MFR BLD: 5.6 % (ref 0–5.6)

## 2022-06-29 PROCEDURE — 36415 COLL VENOUS BLD VENIPUNCTURE: CPT

## 2022-06-29 PROCEDURE — 83036 HEMOGLOBIN GLYCOSYLATED A1C: CPT

## 2022-07-07 ENCOUNTER — ALLIED HEALTH/NURSE VISIT (OUTPATIENT)
Dept: FAMILY MEDICINE | Facility: CLINIC | Age: 57
End: 2022-07-07
Payer: COMMERCIAL

## 2022-07-07 VITALS — HEART RATE: 67 BPM | OXYGEN SATURATION: 97 % | DIASTOLIC BLOOD PRESSURE: 81 MMHG | SYSTOLIC BLOOD PRESSURE: 130 MMHG

## 2022-07-07 DIAGNOSIS — Z01.30 BLOOD PRESSURE CHECK: Primary | ICD-10-CM

## 2022-07-07 PROCEDURE — 99207 PR NO CHARGE NURSE ONLY: CPT

## 2022-07-07 NOTE — PROGRESS NOTES
I met with Juan Treadwell at the request of Edita Dove to recheck his blood pressure.  Blood pressure medications on the med list were reviewed with patient.    Patient has taken all medications as per usual regimen: Yes  Patient reports tolerating them without any issues or concerns: Yes    There were no vitals filed for this visit.    Blood pressure was taken, previous encounter was reviewed, recorded blood pressure below 140/90.  Patient was discharged and the note will be sent to the provider for final review.      Michael Montoya MA

## 2022-07-14 ENCOUNTER — OFFICE VISIT (OUTPATIENT)
Dept: URGENT CARE | Facility: URGENT CARE | Age: 57
End: 2022-07-14
Payer: COMMERCIAL

## 2022-07-14 ENCOUNTER — NURSE TRIAGE (OUTPATIENT)
Dept: NURSING | Facility: CLINIC | Age: 57
End: 2022-07-14

## 2022-07-14 VITALS
OXYGEN SATURATION: 95 % | SYSTOLIC BLOOD PRESSURE: 145 MMHG | HEART RATE: 73 BPM | DIASTOLIC BLOOD PRESSURE: 85 MMHG | WEIGHT: 230.2 LBS | TEMPERATURE: 98.1 F | BODY MASS INDEX: 33.99 KG/M2

## 2022-07-14 DIAGNOSIS — L03.818 CELLULITIS OF OTHER SPECIFIED SITE: Primary | ICD-10-CM

## 2022-07-14 PROCEDURE — 99213 OFFICE O/P EST LOW 20 MIN: CPT | Performed by: STUDENT IN AN ORGANIZED HEALTH CARE EDUCATION/TRAINING PROGRAM

## 2022-07-14 RX ORDER — SULFAMETHOXAZOLE/TRIMETHOPRIM 800-160 MG
1 TABLET ORAL 2 TIMES DAILY
Qty: 20 TABLET | Refills: 0 | Status: SHIPPED | OUTPATIENT
Start: 2022-07-14 | End: 2022-07-24

## 2022-07-14 NOTE — PROGRESS NOTES
Urgent Care Visit    Assessment & Plan   1. Cellulitis of other specified site  Six sutures removed without complication, abx sent. Patient took picture on phone in case this doesn't improve. Discussed ED return precautions.  - sulfamethoxazole-trimethoprim (BACTRIM DS) 800-160 MG tablet; Take 1 tablet by mouth 2 times daily for 10 days  Dispense: 20 tablet; Refill: 0     Options for treatment and follow-up care were reviewed with the patient who was engaged and actively involved in the decision making process, verbalized understanding of the options discussed, and satisfied with the final plan.      Maite Alexander MD    Subjective   Juan Treadwell is a 56 year old male who presents for suture infection.    Patient reports he was breaking apart a patio umbrella on 7/6/22 and one of the arms was broken. His hand slipped and the broken piece cut his right forearm. Seen at ED and had 6 stitches placed. It was healing well until yesterday when surrounding redness developed, pain, tenderness. It hurts worse now then when he cut it. Small amount of pus coming from wound.    He has chills, but no fevers. No HIV history or history of diabetes.      Patient Active Problem List    Diagnosis Date Noted     Nasal septal perforation 10/28/2021     Priority: Medium     Moderate persistent asthma without complication 01/14/2016     Priority: Medium     Restless legs syndrome (RLS) 12/02/2015     Priority: Medium     Gastroesophageal reflux disease without esophagitis      Priority: Medium     Erectile dysfunction, unspecified erectile dysfunction type 11/16/2015     Priority: Medium     DIAMOND (obstructive sleep apnea)- moderate (AHI 17) 11/06/2015     Priority: Medium     ve       Hyperlipidemia LDL goal <130 11/06/2015     Priority: Medium     Allergic rhinitis, unspecified allergic rhinitis type 11/06/2015     Priority: Medium     Male hypogonadism 11/06/2015     Priority: Medium       Social: He reports that he has never  smoked. He has never used smokeless tobacco. He reports current alcohol use of about 3.0 - 4.0 standard drinks of alcohol per week. He reports that he does not use drugs.    There are no exam notes on file for this visit.    Objective     Vitals:    07/14/22 1545   BP: (!) 145/85   BP Location: Left arm   Patient Position: Sitting   Cuff Size: Adult Regular   Pulse: 73   Temp: 98.1  F (36.7  C)   TempSrc: Tympanic   SpO2: 95%   Weight: 104.4 kg (230 lb 3.2 oz)     Body mass index is 33.99 kg/m .    GEN: NAD, healthy, alert  Constitutional: Awake, alert, cooperative, no acute distress, and appears stated age.  HEENT: NC/AT, EOMI, normal conjunctivae/sclerae, mask on  Lungs: No increased WOB.   Musculoskeletal: No redness, warmth, or swelling of the joints. Tone is normal.  Neurologic: A&Ox3.    Neuropsychiatric: Normal affect, mood, orientation, memory and insight.  Skin: There is approximately 4cm sutured area with surrounding redness, warmth and tenderness. Dried yellow discharge at top of wound and coming from where sutures were placed; full ROM of elbow and wrist, no streaking, CMS intact

## 2022-07-14 NOTE — TELEPHONE ENCOUNTER
Cut himself last week now there is pus from it. Cut is on forearm, right. Stitches intact but increasing redness, pain and discharge from wound. Hurts more now that when it initially happened. He has been using antibiotic ointment and bandages, as recommended care.  I connected with scheduling for an appointment and advised urgent care if they can't get him in.  Gisele Liang RN  [facility]      Reason for Disposition    Pus or cloudy fluid draining from wound    Additional Information    Negative: Stitches and not infected    Negative: Surgical wound infection suspected (post-op)    Negative: Bright red, wide-spread, sunburn-like rash    Negative: Black (necrotic) or blisters develop in wound    Negative: Looks infected (spreading redness, red streak, pus) and fever    Negative: Patient sounds very sick or weak to the triager    Negative: Severe pain in the wound     Pain at 3.    Negative: Red streak runs from the wound    Negative: Facial wound looks infected (spreading redness)    Negative: Finger wound and entire finger swollen    Negative: Skin redness around the wound larger than 2 inches (5 cm)    Protocols used: WOUND INFECTION-A-OH

## 2022-08-03 ENCOUNTER — VIRTUAL VISIT (OUTPATIENT)
Dept: SURGERY | Facility: CLINIC | Age: 57
End: 2022-08-03
Payer: COMMERCIAL

## 2022-08-03 VITALS — BODY MASS INDEX: 32.49 KG/M2 | WEIGHT: 220 LBS

## 2022-08-03 DIAGNOSIS — G47.33 OSA (OBSTRUCTIVE SLEEP APNEA): Chronic | ICD-10-CM

## 2022-08-03 DIAGNOSIS — E66.09 CLASS 1 OBESITY DUE TO EXCESS CALORIES WITH SERIOUS COMORBIDITY AND BODY MASS INDEX (BMI) OF 32.0 TO 32.9 IN ADULT: Primary | ICD-10-CM

## 2022-08-03 DIAGNOSIS — K21.9 GASTROESOPHAGEAL REFLUX DISEASE WITHOUT ESOPHAGITIS: Chronic | ICD-10-CM

## 2022-08-03 DIAGNOSIS — E66.811 CLASS 1 OBESITY DUE TO EXCESS CALORIES WITH SERIOUS COMORBIDITY AND BODY MASS INDEX (BMI) OF 32.0 TO 32.9 IN ADULT: Primary | ICD-10-CM

## 2022-08-03 DIAGNOSIS — E78.5 HYPERLIPIDEMIA LDL GOAL <130: Chronic | ICD-10-CM

## 2022-08-03 PROCEDURE — 99213 OFFICE O/P EST LOW 20 MIN: CPT | Mod: 95 | Performed by: PHYSICIAN ASSISTANT

## 2022-08-03 RX ORDER — PHENTERMINE HYDROCHLORIDE 37.5 MG/1
TABLET ORAL
Qty: 30 TABLET | Refills: 0 | Status: SHIPPED | OUTPATIENT
Start: 2022-08-03 | End: 2022-09-07

## 2022-08-03 NOTE — PROGRESS NOTES
JUAN is a 57 year old who is being evaluated via a billable video visit.      If the video visit is dropped, the invitation should be resent by: Text to cell phone: [phone]  Will anyone else be joining your video visit? No      Video-Visit Details    Type of service:  Video Visit    Video Start Time: 11:34    Video End Time: 11:55      25 minutes spent on the date of the encounter doing chart review, review of test results, patient visit and documentation       Originating Location (pt. Location): Home    Distant Location (provider location):  [facility] [place]     Platform used for Video Visit: [employer]          August 3, 2022        Return Virtual Medical Weight Management Note     Juan Treadwell  MRN:  [id number]  :  1965      Dear Kelli Porter,    I had the pleasure of doing a virtual visit with your patient Juan Treadwell.  He is a 57 year old male who I am continuing to see for treatment of obesity related to:       2022   I have the following health issues associated with obesity: High Cholesterol, Sleep Apnea, GERD (Reflux)   I have the following symptoms associated with obesity: Knee Pain, Back Pain, Hip Pain       INTERVAL HISTORY:  Patient was seen initially 2022. He was started on phentermine 15 mg capsules in the AM. Does not notice much of a difference in his appetite when he takes it. Not even when he initially started.  He is intentionally eating less and eating some veggies.  Reviewed recent BP and pulse. BPs have been marginal. No dry mouth, constipation or insomnia.  No chest pain or SOB.  Tolerating without side effects  Felt like the dietitian appointment went well.        Plan:  Increase water to 32 oz daily - Start with 10 oz first thing in the AM - MET. Has cut down on his diet coke   Have breakfast daily - Doing good  3 times weekly of walking for cardio goal - MET         CURRENT WEIGHT:   220 lbs 0 oz    Wt Readings from  Last 4 Encounters:   08/03/22 220 lb (99.8 kg)   07/14/22 230 lb 3.2 oz (104.4 kg)   06/23/22 227 lb (103 kg)   06/23/22 227 lb (103 kg)       BARIATRIC METRICS:  Current Weight: 220 lb (99.8 kg)  Body mass index is 32.49 kg/m .             DIETARY HISTORY  Meals Per Day: 3  Food Diary:   B: 2 pieces of Martin's killer Bread with homemade jelly.  Can of diet coke  L: chicken breast made at home with few grapes - diet coke  D: Sloppy joes  X 1 and zucchini and squash stir fried with onions - Beer   Fluid Intake: had made it a routine to drink a full glass first thing in the AM. Is also drinking more water during the day. Getting around 40 oz regularly, diet coke around 40 oz.          Exercise:  Will walk on treadmill X 60 minutes.  Doing a couple times weekly. Tries to walk outside another 2 days weekly       ROS 8/3/2022  General  Fatigue: Feeling less tired in the mid day.  Feels he is sleeping better  Sleep Quality: OK  No dry mouth or insomnia  HEENT  Hx of glaucoma: No  Vision changes: No  Cardiovascular  Chest Pain with Exertion: No  Palpitations: No  Hx of heart disease: No - Reviewed EKG 2014 WNL  Hx of PVD No  Pulmonary  Shortness of breath at rest: No  Shortness of breath with exertion: No  Snoring: Has CPAP and uses regularly  Gastrointestinal  Heartburn: yes - takes pepcid prn  Abdominal pain: No  History of pancreatitis: No  Severe constipation: No  Hx of bowel obstruction: No  Gastrourinary  History of kidney stones: possibly a mild stone.  Not sure.  Went to urgent care many years ago  Psychiatric  Moods Stable: Yes  History of drug abuse: No  No history of eating disorder  Endocrine  Polydipsia: No  Polyuria: yes - feels like bladder doesn't fully empty  Personal or family history of thyroid cancer: No  Neurologic:  Hx of seizures: No  Hx of paresthesias: No      MEDICATIONS:   Current Outpatient Medications   Medication     albuterol (PROAIR HFA/PROVENTIL HFA/VENTOLIN HFA) 108 (90 Base) MCG/ACT  inhaler     atorvastatin (LIPITOR) 80 MG tablet     famotidine (PEPCID) 40 MG tablet     montelukast (SINGULAIR) 10 MG tablet     Multiple Vitamins-Minerals (MULTIVITAMIN OR)     phentermine (ADIPEX-P) 15 MG capsule     rOPINIRole (REQUIP) 2 MG tablet     tadalafil (CIALIS) 5 MG tablet     Cholecalciferol (VITAMIN D) 2000 UNITS tablet     COMPOUNDED NON-CONTROLLED SUBSTANCE (CMPD RX) - PHARMACY TO MIX COMPOUNDED MEDICATION     order for DME     order for DME     No current facility-administered medications for this visit.       PE:  Wt 220 lb (99.8 kg)   BMI 32.49 kg/m    GENERAL: Healthy, alert and no distress  EYES: Eyes grossly normal to inspection.  No discharge or erythema, or obvious scleral/conjunctival abnormalities.  RESP: No audible wheeze, cough, or visible cyanosis.  No visible retractions or increased work of breathing.    SKIN: Visible skin clear. No significant rash, abnormal pigmentation or lesions.  NEURO: Cranial nerves grossly intact.  Mentation and speech appropriate for age.  PSYCH: Mentation appears normal, affect normal/bright, judgement and insight intact, normal speech and appearance well-groomed.        ASSESSMENT/PLAN:   Class 1 severe obesity due to excess calories with Body Mass Index (BMI) of 33  Asthma  GERD without esophagitis  DIAMOND         Congratulated patient on his lifestyle changes and weight loss.  Will have him increase to phentermine 37.5 mg tablets. Will watch his BP. Patient has agreed to get his blood pressure checked twice in the next month at a [facility] nurse visit after increasing dose. Both to be reviewed by this provider. Initially sending over #30 phentermine 37.5 mg tablets. If reading are on will send over an additional 6 week supply.      Patient verbalizes understanding and is in agreement with this plan.     Follow up in 2 months.       Micha Arroyo MS, PA-C

## 2022-08-03 NOTE — PATIENT INSTRUCTIONS
It was good talking with you today!  Please call [phone] and schedule a return visit for the first week of October.  Also get 2 separate blood pressure readings at a [place] [facility] and have them forward over to me.     Have a great afternoon      Micha FLOWER        Eat Better ? Move More ? Live Well    Eat 3 nutrient-rich meals each day    Don t skip meals--it will cause you to overeat later in the day!    Eating fiber (vegetables/fruits/whole grains) and protein with meals helps you stay full longer    Choose foods with less than 10 grams of sugar and 5 grams of fat per serving to prevent excess calories and weight re-gain  Eat around the same times each day to develop a routine eating schedule   Avoid snacking unless physically hungry.   Planned snacks: 1-2 times per day and no more than 150 calories    Eat protein first   Protein helps with healing, maintaining adequate muscle mass, reducing hunger and optimizing nutritional status   Aim for 60-80 grams of protein per day   Fill up on Fiber   Fiber comes from plants--fruits, veggies, whole grains, nuts/seeds and beans   Fiber is low in calories, high in phytonutrients and helps you stay full longer   Aim for 25-35 grams per day by eating fiber with meals and snacks  Eat [id number]   Take 20-30 minutes to eat each meal by taking small bites, chewing foods to applesauce consistency or 20-30 times before you swallow   Eating foods too fast can delay satiety/fullness signals and increase overeating   Slow down your eating by using toddler utensils, putting your fork/spoon down between bites and not watching TV or emailing during meals!   Keep a Journal         Writing down what you eat, how you feel and when you are active helps you identify new changes to work on from week to week         Look for ways to cut 100 calories from your current diet 2-3 times per day  Drink 64 ounces of 0-Calorie drinks between meals   Water   Zero calorie Propel  or Vitamin  Water     SoBe Lifewater  Zero Calories   Crystal Light , Sugar-Free Joselito-Aid , and other sugar-free lemonade or flavored coleman   Keep Caffeine to less than 300mg per day ie: 3-6oz cups coffee     Work up to 45-60 minutes of physical activity most days of the week   Helps with losing weight and prevent regaining those extra pounds!    Do a combo of cardio (walking/water exercises) and strength training (lifting weights/Vinyasa yoga)    Avoid Mindless Eating   Be present when you eat--take note of the smell, taste and quality of your food   Make a list of alternative activities you could do to prevent eating out of boredom/stress  Go for a walk, call a friend, chew gum, paint your nails, re-organize the garage, etc

## 2022-08-09 ENCOUNTER — VIRTUAL VISIT (OUTPATIENT)
Dept: SURGERY | Facility: CLINIC | Age: 57
End: 2022-08-09
Payer: COMMERCIAL

## 2022-08-09 VITALS — HEIGHT: 69 IN | WEIGHT: 220 LBS | BODY MASS INDEX: 32.58 KG/M2

## 2022-08-09 DIAGNOSIS — E66.811 CLASS 1 OBESITY DUE TO EXCESS CALORIES WITH SERIOUS COMORBIDITY AND BODY MASS INDEX (BMI) OF 32.0 TO 32.9 IN ADULT: ICD-10-CM

## 2022-08-09 DIAGNOSIS — E66.09 CLASS 1 OBESITY DUE TO EXCESS CALORIES WITH SERIOUS COMORBIDITY AND BODY MASS INDEX (BMI) OF 32.0 TO 32.9 IN ADULT: ICD-10-CM

## 2022-08-09 PROCEDURE — 97803 MED NUTRITION INDIV SUBSEQ: CPT | Mod: 95 | Performed by: DIETITIAN, REGISTERED

## 2022-08-09 NOTE — PROGRESS NOTES
STEPHANI is a 57 year old who is being evaluated via a billable video visit.      How would you like to obtain your AVS? MyChart  If the video visit is dropped, the invitation should be resent by: Text to cell phone: [phone]  Will anyone else be joining your video visit? No          Video-Visit Details    Video Start Time: 8:28am    Type of service:  Video Visit    Video End Time:8:51am    Originating Location (pt. Location): Home    Distant Location (provider location):  [facility] [place]     Platform used for Video Visit: [employer]      MEDICAL WEIGHT LOSS FOLLOW UP    Reason for visit: medical weight loss     DIAGNOSIS:  Class I Obesity    ANTHROPOMETRICS:  Initial Weight: 227 pounds (6/23/2022)  Current Weight:  220 pounds  Weight Change: -7 lbs  BMI: 32.49 kg/m2    Weight Loss Medications:   Phentermine; note dose increase at provider visit on 8/3/2022    NUTRITION HISTORY:  Breakfast: [wakes at 6:30am; eats at 8:30am] hash browns + eggs (2)  deli meat + toast  tries to include fruit when able (peaches or grapes, lately)   Lunch: [12-12:30pm] toast  soup (canned)  salad + lunch meat or hard boiled egg   Dinner: [7:30pm] chicken, steak or fish + vegetables +/- starch (rice or corn)    Snacks: [evenings; ~10pm] chips  Beverage choices: water (60oz), diet coke (24-36oz), diet tea (occasional), beer or wine (w/dinner)    Dining Out:  How often do you dine out: 2x/week  What types of food do you order: Chipotle, burger, chicken sandwich       Exercise:  Type: treadmill or elliptical, walking outside  Duration: 60-90 minutes  Frequency: 2-3x/week    Additional Information: Pt did not notice any change in appetite with initial dose of Phentermine. Started increased dose today. Reviewed rationale for having balanced meals throughout the day to help curb appetite later in day/evening. Pt will increase fiber at breakfast and protein at lunch, as well as include afternoon protein-based  snack. Reviewed plate method w/visuals d/t video malfunction at last appointment.     EVALUATION/PROGRESS TOWARDS GOALS:  Previous Goals:   Increase water intake to 64oz per day - met  Eat balanced meals at regular intervals - improving    Previous Nutrition Diagnosis:  Obese class I related to excess energy intake and self-monitoring deficit as evidenced by BMI of 33.52 kg/m2.  No change, modified below     Current Nutrition Diagnosis:   Obese class I related to excess energy intake and self-monitoring deficit as evidenced by BMI of 32.49 kg/m2.    INTERVENTION:    Nutrition Prescription:  Recommend modified nutrient intake by decreasing energy intake.    Implementation:    Nutrition Education (Content):    Discussed previous goals and determined new goals    Encourage physical activity    Supported patient in attempted weight loss and behavior changes    Patient verbalizes understanding of diet by stating he will increase balance at breakfast and lunch.    Anticipate good compliance    Goals:  Increase fiber at breakfast by including a fruit  Increase protein at lunch by including a side of dairy in addition to your soup  Have a protein-based snack in the afternoon    Follow Up/Monitoring:  Other  -  patient to follow up in 4-8 weeks    Time Spent With Patient:  23 Minutes      Vaishnavi Vargas RD, LD  Clinical Dietitian   [facility]      [place]      Physical Exam

## 2022-08-09 NOTE — PATIENT INSTRUCTIONS
Pan Martinez!      It was great chatting with you again today! Here's a summary of the goals we discussed:    1. Increase fiber at breakfast  - Routinely include some fruit  - Aim for 3 food groups at breakfast - protein + fruit + starch    2. Increase protein at lunch  - Include a side of protein (ie cottage cheese or yogurt) at lunch    3. Add an afternoon snack  - See list below  - If trying a protein shake/bar, choose products with the following nutrient criteria:  <250 calories  15-30g protein  <10g fat  <10g sugar      Plan on following up in 1-2 months. This can be scheduled via our call center at . Reach out sooner with any questions or concerns. Have a great day!      Vaishnavi Vargas, RD, LD?  Clinical Dietitian     Easy Foods (by group)  Meals: pick 1 item from each food group  Snacks: pick 1 item from 1-2 food groups    Protein:  yogurt  cottage cheese  low-fat cheese sticks/string cheese  lean deli meat (ie: chicken, turkey or low-fat ham)  pre-cooked grilled chicken breast  hard boiled eggs  nuts/seeds (in moderation - up to 1 serving [1/4c] per day)  protein shakes/bars (ie:  Premier Protein )  low-fat milk       Vegetables:  baby carrots  celery sticks  sugar snap peas  cherry tomatoes  sliced bell peppers  sliced cucumber  chopped broccoli or cauliflower  raw string beans    Fruit:  grapes  berries  apples  pears  bananas  peaches  nectarines  plums  kiwi  pineapple      Starches:  whole wheat tasneem bread ( Torito s )  high-fiber tortillas ( [place] Carb Balance )  Bean-based tortilla chips ( Beanitos )  Whole wheat crackers ( Triscuits )  Whole wheat bread  Dried/roasted beans/lentils ( Bada Bean Bada Boom  dried Sharon beans - available online or in some stores)  roasted chickpeas  high-fiber cereal (Fiber One, Kashi, Shredded Wheat, Grape Nuts, etc)

## 2022-08-12 ENCOUNTER — ALLIED HEALTH/NURSE VISIT (OUTPATIENT)
Dept: FAMILY MEDICINE | Facility: CLINIC | Age: 57
End: 2022-08-12
Payer: COMMERCIAL

## 2022-08-12 VITALS — DIASTOLIC BLOOD PRESSURE: 80 MMHG | SYSTOLIC BLOOD PRESSURE: 112 MMHG

## 2022-08-12 DIAGNOSIS — Z01.30 BLOOD PRESSURE CHECK: Primary | ICD-10-CM

## 2022-08-12 PROCEDURE — 99207 PR NO CHARGE NURSE ONLY: CPT

## 2022-08-12 NOTE — PROGRESS NOTES
I met with Juan Treadwell at the request of Micha Arroyo PA-C to recheck his blood pressure.  Blood pressure medications on the med list were reviewed with patient.    Patient has taken all medications as per usual regimen: Yes  Patient reports tolerating them without any issues or concerns: Yes    Vitals:    08/12/22 1236 08/12/22 1242   BP: (!) 136/90 112/80       After 5 minutes, the patient's blood pressure was <140/90, the previous encounter was reviewed, recorded blood pressure below 140/90. Patient was discharged and the note will be sent to the provider for final review.    Yun Hernandez, CMA

## 2022-08-15 DIAGNOSIS — G25.81 RESTLESS LEGS SYNDROME (RLS): ICD-10-CM

## 2022-08-16 RX ORDER — ROPINIROLE 2 MG/1
TABLET, FILM COATED ORAL
Qty: 90 TABLET | Refills: 0 | Status: SHIPPED | OUTPATIENT
Start: 2022-08-16 | End: 2022-11-01

## 2022-08-16 NOTE — TELEPHONE ENCOUNTER
Discussed with the patient advised to take some decongestant something like Mucinex D   Routing refill request to provider for review/approval because:  Drug not on the [facility] refill protocol    Antiparkinson's Agents Protocol Failed 08/15/2022 11:07 PM   Protocol Details  CBC on record in past 12 months

## 2022-08-23 DIAGNOSIS — N52.9 ERECTILE DYSFUNCTION, UNSPECIFIED ERECTILE DYSFUNCTION TYPE: Chronic | ICD-10-CM

## 2022-08-24 RX ORDER — TADALAFIL 5 MG/1
TABLET ORAL
Qty: 90 TABLET | Refills: 0 | Status: SHIPPED | OUTPATIENT
Start: 2022-08-24 | End: 2022-11-07

## 2022-08-24 NOTE — TELEPHONE ENCOUNTER
Prescription approved per [facility] Refill Protocol.  Kae Pope RN

## 2022-09-07 ENCOUNTER — MYC REFILL (OUTPATIENT)
Dept: SURGERY | Facility: CLINIC | Age: 57
End: 2022-09-07

## 2022-09-07 DIAGNOSIS — E66.09 CLASS 1 OBESITY DUE TO EXCESS CALORIES WITH SERIOUS COMORBIDITY AND BODY MASS INDEX (BMI) OF 32.0 TO 32.9 IN ADULT: ICD-10-CM

## 2022-09-07 DIAGNOSIS — E66.811 CLASS 1 OBESITY DUE TO EXCESS CALORIES WITH SERIOUS COMORBIDITY AND BODY MASS INDEX (BMI) OF 32.0 TO 32.9 IN ADULT: ICD-10-CM

## 2022-09-07 RX ORDER — PHENTERMINE HYDROCHLORIDE 37.5 MG/1
TABLET ORAL
Qty: 30 TABLET | Refills: 0 | Status: SHIPPED | OUTPATIENT
Start: 2022-09-07 | End: 2022-12-22

## 2022-10-05 ENCOUNTER — TELEPHONE (OUTPATIENT)
Dept: SURGERY | Facility: CLINIC | Age: 57
End: 2022-10-05

## 2022-10-05 ENCOUNTER — VIRTUAL VISIT (OUTPATIENT)
Dept: SURGERY | Facility: CLINIC | Age: 57
End: 2022-10-05
Payer: COMMERCIAL

## 2022-10-05 VITALS — HEIGHT: 69 IN | WEIGHT: 210 LBS | BODY MASS INDEX: 31.1 KG/M2

## 2022-10-05 DIAGNOSIS — K21.9 GASTROESOPHAGEAL REFLUX DISEASE WITHOUT ESOPHAGITIS: Chronic | ICD-10-CM

## 2022-10-05 DIAGNOSIS — E66.09 CLASS 1 OBESITY DUE TO EXCESS CALORIES WITH SERIOUS COMORBIDITY AND BODY MASS INDEX (BMI) OF 31.0 TO 31.9 IN ADULT: Primary | ICD-10-CM

## 2022-10-05 DIAGNOSIS — E78.5 HYPERLIPIDEMIA LDL GOAL <130: Chronic | ICD-10-CM

## 2022-10-05 DIAGNOSIS — E66.811 CLASS 1 OBESITY DUE TO EXCESS CALORIES WITH SERIOUS COMORBIDITY AND BODY MASS INDEX (BMI) OF 31.0 TO 31.9 IN ADULT: Primary | ICD-10-CM

## 2022-10-05 PROCEDURE — 99214 OFFICE O/P EST MOD 30 MIN: CPT | Mod: 95 | Performed by: PHYSICIAN ASSISTANT

## 2022-10-05 NOTE — PATIENT INSTRUCTIONS
"Nice to talk with you today. Thank you for allowing me the privilege of caring for you. We hope we provided you with the excellent service you deserve.     To ensure the quality of our services you may receive a patient satisfaction survey from an independent monitoring company.  The greatest compliment you can give is \"Likely to Recommend\"    Below is our plan we discussed.-  MUNDO Muse        Please call [phone] and schedule a follow up with Micha Arroyo PA-C in 6-7 weeks.  If you need to reach me sooner you can do so by calling [phone].    Have a great day!         MEDICATION STARTED AT THIS APPOINTMENT    We are starting a GLP-1 (Glucagon-like Peptide-1) medication called Saxenda. One of the ways it works is by slowing down the rate that food leaves your stomach. You feel ng and will eat less. It also helps regulate hormones that can help improve your blood sugars.    If you are a patient that checks blood sugars, continue to check as instructed by your doctor. Low blood sugars are rare but can happen if patients are on insulin or other oral agents. If you notice consistent low sugars or high sugars, your medication may need to be adjusted after your appointment. If this is the case, please call RN and provide her your blood sugar record from the last 3-4 days. The RN will get in touch with the doctor and call you back/[facility] message with recommendations. We tolerate high sugars for a bit, so if sugars are running 180-200, this is ok. As weight starts dropping the blood sugars should too. If readings are consistently over 200 for 1-2 weeks, then you should call the doctor/nurse.    Dosing for this medication:   Week 1- Inject 0.6 mg daily  Week 2- Inject 1.2 mg daily  Week 3- Inject 1.8 mg daily  Week 4- Inject 2.4 mg daily  Week 5 and thereafter- Inject 3.0 mg daily    Side effects of GLP- Medications include: The most common side effects are all GI related and consist of: nausea, constipation, " diarrhea, burping, or gassiness. Patients are advised to eat slowly and less, and nausea typically passes if people can stick it out.     The risk of pancreatitis (inflammation of the pancreas) has been associated with this type of medication, but is very rare.  If you have had pancreatitis in the past, this medication may not be for you. Please let us know about any past history of pancreas problems.    Symptoms of pancreatitis include: Pain in your upper stomach area which may travel to your back and be worse after eating. Your stomach area may be tender to the touch.  You may have vomiting or nausea and/or have a fever. If you should develop any of these symptoms, stop the medication and contact your primary care doctor. They will do a blood test to check for pancreatitis.         There is a small chance you may have some low blood sugar after taking the medication.   The signs of low blood sugar are:  Weakness  Shaky   Hungry  Sweating  Confusion      See below for ways to treat low blood sugar without adding in lots of extra calories.      Treating Low Blood Sugar    If you have symptoms of low blood sugar (sweating, shaking, dizzy, confused) eat 15 grams of carbs and wait 15 minutes:    Glucose Tabs are best for sugars under 70 -  Dex4 or BD Glucose tablets are good, you will need to take 3-4 of these to equal 15 grams.     One small box of raisins  4 oz fruit juice box or   cup fruit juice  1 small apple  1 small banana    cup canned fruit in water    English muffin or a slice of bread with jelly   1 low fat frozen waffle with sugar-free syrup    cup cottage cheese with   cup frozen or fresh blueberries  1 cup skim or low-fat milk    cup whole grain cereal  4-6 crackers such as Triscuits      This medication is usually not covered by insurance and can be quite expensive. Sometimes a prior authorization is required, which may take up to 1-2 weeks for an insurance company to make a decision if they will cover  "the medication. Please be patient, you will be notified after a decision has been made.    Contact the nurse via [facility] or call [phone] if you have any questions or concerns. (Do not stop taking it if you don't think it's working. For some people it works without them knowing it.)     In order to get refills of this or any medication we prescribe you must be seen in the medical weight mgmt clinic every 2-4 months.      If you have access to a computer, you can get the Saxenda savings coupon by following the below information:     1. Go to this [employer] website: https://[email or web address]/saxenda/savings-card.[email or web address]    2. Click on \"get your card here\"    3. Select the button next to \"I need a new card or a replacement card\"    4.  Select the button next to \"No, I am not enrolled\" regarding the \"Are you enrolled in any government, state, or federally funded medical or prescription benefit programs? (Examples include but are not limited to Medicare, Medigap, VA, DOD, , or any similar federal or state health care program.)\" question.     5. Select \"Yes\" for the \"Do you have commercial (also known as private) insurance that covers your prescription? (Example: Insurance provided through an employer)\" question.     6. Follow the prompts and fill out your information, then press \"next\".    7. The site will construct a savings coupon card for you. You will be asked if you would like the card printed, emailed, etc and select the option you prefer. You can always take a picture of the card that it shows you.     8. Bring the savings card information to your pharmacy and they will use the savings card to reduce your copay.       "

## 2022-10-05 NOTE — PROGRESS NOTES
JUAN is a 57 year old who is being evaluated via a billable video visit.      If the video visit is dropped, the invitation should be resent by: Text to cell phone: [phone]  Will anyone else be joining your video visit? No      Video-Visit Details    Type of service:  Video Visit    Video Start Time: 1:34    Video End Time:  2:02      35 minutes spent on the date of the encounter doing chart review, review of test results, patient visit and documentation       Originating Location (pt. Location): Home    Distant Location (provider location):  [facility] [place]     Platform used for Video Visit: Second Light          2022          Return Virtual Medical Weight Management Note     Juan Treadwell  MRN:  [id number]  :  1965      Dear Kelli Porter,    I had the pleasure of doing a virtual visit with your patient Juan Treadwell.  He is a 57 year old male who I am continuing to see for treatment of obesity related to:       2022   I have the following health issues associated with obesity: High Cholesterol, Sleep Apnea, GERD (Reflux)   I have the following symptoms associated with obesity: Knee Pain, Back Pain, Hip Pain       INTERVAL HISTORY:  Patient was seen initially 2022. He was started on phentermine 37.5 mg capsules in the AM.  Has a good amount of appetite suppression. Feels he is on a bit of a plateau right now. Been around this weight for a couple of weeks. Had a blood pressure check done with the initial dose increase but has not been repeated.   No dry mouth, constipation or insomnia.  No chest pain or SOB. Has noticed some urinary urgency before starting the phentermine but has gotten a bit worse.   Felt like the dietitian appointment went well.    Has been doing better with exercise        CURRENT WEIGHT:   210 lbs 0 oz    Wt Readings from Last 4 Encounters:   10/05/22 210 lb (95.3 kg)   22 220 lb (99.8 kg)   22 220 lb  (99.8 kg)   07/14/22 230 lb 3.2 oz (104.4 kg)       BARIATRIC METRICS:  Current Weight: 210 lb (95.3 kg)  Body mass index is 31.01 kg/m .   Wt change since last visit (lbs): 0  Cumulative weight loss (lbs): 17         DIETARY HISTORY  Meals Per Day: 3  Food Diary:   B: protein bar - Can of diet coke  L: Has not eaten as of 1:45 pm- diet coke  D:  2 Tacos at a restaurant and a damaris  Fluid Intake: Getting in around 64 oz daily, propel 3-4 times weekly, 32 oz diet coke in the am. Drinks about 9 alcoholic beverages weekly. Mostly beer      Exercise:  Will walk on treadmill X 60 minutes.  Doing a couple times weekly. Tries to walk outside another 2 days weekly. Just got a recumbent bike      ROS 10/4/2022  General  Fatigue:better  Sleep Quality: OK  No dry mouth or insomnia  HEENT  Hx of glaucoma: No  Vision changes: No  Cardiovascular  Chest Pain with Exertion: No  Palpitations: No  Hx of heart disease: No - Reviewed EKG 2014 WNL  Hx of PVD No  Pulmonary  Shortness of breath at rest: No  Shortness of breath with exertion: No  Snoring: Has CPAP and uses regularly  Gastrointestinal  Heartburn: not had much of an issue with phentermine and weight loss  Abdominal pain: No  History of pancreatitis: No  Severe constipation: No  Hx of bowel obstruction: No  Gastrourinary  History of kidney stones: possibly a mild stone.  Not sure.  Went to urgent care many years ago  Psychiatric  Moods Stable: Yes  History of drug abuse: No  No history of eating disorder  Endocrine  Polydipsia: No  Polyuria: yes - feels like bladder doesn't fully empty  Personal or family history of thyroid cancer: No  Neurologic:  Hx of seizures: No  Hx of paresthesias: No      MEDICATIONS:   Current Outpatient Medications   Medication     albuterol (PROAIR HFA/PROVENTIL HFA/VENTOLIN HFA) 108 (90 Base) MCG/ACT inhaler     atorvastatin (LIPITOR) 80 MG tablet     Cholecalciferol (VITAMIN D) 2000 UNITS tablet     COMPOUNDED NON-CONTROLLED SUBSTANCE (CMPD  "RX) - PHARMACY TO MIX COMPOUNDED MEDICATION     famotidine (PEPCID) 40 MG tablet     montelukast (SINGULAIR) 10 MG tablet     Multiple Vitamins-Minerals (MULTIVITAMIN OR)     order for DME     order for DME     phentermine (ADIPEX-P) 15 MG capsule     phentermine (ADIPEX-P) 37.5 MG tablet     rOPINIRole (REQUIP) 2 MG tablet     tadalafil (CIALIS) 5 MG tablet     No current facility-administered medications for this visit.       PE:  Ht 5' 9\" (1.753 m)   Wt 210 lb (95.3 kg)   BMI 31.01 kg/m    GENERAL: Healthy, alert and no distress  EYES: Eyes grossly normal to inspection.  No discharge or erythema, or obvious scleral/conjunctival abnormalities.  RESP: No audible wheeze, cough, or visible cyanosis.  No visible retractions or increased work of breathing.    SKIN: Visible skin clear. No significant rash, abnormal pigmentation or lesions.  NEURO: Cranial nerves grossly intact.  Mentation and speech appropriate for age.  PSYCH: Mentation appears normal, affect normal/bright, judgement and insight intact, normal speech and appearance well-groomed.        ASSESSMENT/PLAN:   Class 1 severe obesity due to excess calories with Body Mass Index (BMI) of 31  Asthma  GERD without esophagitis  DIAMOND       Congratulated patient on his lifestyle changes and weight loss.    We discussed the role of pharmacological agents in the treatment of obesity and the \"off-label\" use of medications in this practice. We discussed the risks and benefits of each. We discussed indications, contraindications, potential side effects, and estimated costs of each. Discussed that medications must always be used together with lifestyle changes such as improvements in diet choices, portion control and establishing and maintaining a regular exercise program.     Stop the phentermine and follow up with primary regarding urinary retention.      Patient states insurance covers [facility] so RX sent to pharmacy today. Side effects reviewed and patient information " provided. He is advised to reduce alcohol use. Lab placed to have drawn in the next 1-2 weeks.        Follow up 6-7 weeks      Micha Arroyo MS, PA-C

## 2022-10-05 NOTE — TELEPHONE ENCOUNTER
Prior Authorization Retail Medication Request    Medication/Dose: liraglutide - Weight Management (SAXENDA) 18 MG/3ML pen  Sig: Week 1: 0.6 mg subcutaneous daily, Week 2: 1.2 mg daily, Week 3: 1.8 mg daily, Week 4: 2.4 mg daily, Week 5 & on: 3 mg daily    ICD code (if different than what is on RX):  Class 1 obesity due to excess calories with serious comorbidity and body mass index (BMI) of 31.0 to 31.9 in adult [E66.09, Z68.31]  - Primary    Previously Tried and Failed:  phentermine  Rationale:  Weight management/loss    Insurance Name:  [facility]  Insurance ID:  [id number]       Pharmacy Information (if different than what is on RX)  Name:  [id number] - [place] - [address]  Phone:  [phone]

## 2022-10-06 NOTE — TELEPHONE ENCOUNTER
Central Prior Authorization Team - Phone: [phone]     PA Initiation    Medication: liraglutide - Weight Management (SAXENDA) 18 MG/3ML pen- PA INITIATED  Insurance Company:    Pharmacy Filling the Rx: [facility] # [id number] - [place] - [address]  Filling Pharmacy Phone: [phone]  Filling Pharmacy Fax:    Start Date: 10/6/2022

## 2022-10-06 NOTE — TELEPHONE ENCOUNTER
Central Prior Authorization Team - Phone: [phone]     PRIOR AUTHORIZATION DENIED    Medication: liraglutide - Weight Management (SAXENDA) 18 MG/3ML pen- PA DENIED    Denial Date: 10/6/2022    Denial Rational:   Waiting for [employer] to resend denial letter with complete information. PA team will update this document when received.      Appeal Information:If the provider would like to appeal, please provide a letter of medical necessity and route back to the team. Otherwise you can close the encounter. Thank you, Central PA Team

## 2022-10-10 ENCOUNTER — HEALTH MAINTENANCE LETTER (OUTPATIENT)
Age: 57
End: 2022-10-10

## 2022-10-11 ENCOUNTER — TELEPHONE (OUTPATIENT)
Dept: FAMILY MEDICINE | Facility: CLINIC | Age: 57
End: 2022-10-11

## 2022-10-11 NOTE — TELEPHONE ENCOUNTER
Reason for call:  Other   Patient called regarding (reason for call): call back  Additional comments: PT is looking to see if he qualifies for the Inspire. Or what is the process for that. PT last saw Elias Donahue in Dec 2021. Pts insurance covers it, looking to have it done before end of year, please reach out to pt.     Phone number to reach patient:  Cell number on file:    Telephone Information:   Mobile [phone]       Best Time:  Anytime    Can we leave a detailed message on this number?  YES    Travel screening: Not Applicable

## 2022-10-11 NOTE — TELEPHONE ENCOUNTER
Central Prior Authorization Team - Phone: [phone]     I called [employer] again to request denial letter (see prior note) be resent to PA dept  to read denial rationale, spoke to representative who stated he saw it had been approved since the denial date.    Effective back dated 30 days to 9/7/22  And until 2/7/2023  Called pharmacy to verify they receive paid claim.    Prior Authorization Approval    Authorization Effective Date: 9/7/2022  Authorization Expiration Date: 2/7/2023  Medication: liraglutide - Weight Management (SAXENDA) 18 MG/3ML pen- PA approved  Approved Dose/Quantity: 15  Reference #:     Insurance Company:    Expected CoPay:       CoPay Card Available:      Foundation Assistance Needed:    Which Pharmacy is filling the prescription (Not needed for infusion/clinic administered): [id number] - [place], [place] - [address] [address]  Pharmacy Notified: YesComment:  called to verify if received paid claim, spoke to [facility] she stated they did have paid claim from insurance, copay high cost due to non preferred drug. will notify patient to see if still wants filled  Patient Notified: YesComment:  pharmacy will notify when ready

## 2022-10-12 ENCOUNTER — DOCUMENTATION ONLY (OUTPATIENT)
Dept: SLEEP MEDICINE | Facility: CLINIC | Age: 57
End: 2022-10-12
Payer: COMMERCIAL

## 2022-10-12 ENCOUNTER — LAB (OUTPATIENT)
Dept: LAB | Facility: CLINIC | Age: 57
End: 2022-10-12
Payer: COMMERCIAL

## 2022-10-12 DIAGNOSIS — E66.09 CLASS 1 OBESITY DUE TO EXCESS CALORIES WITH SERIOUS COMORBIDITY AND BODY MASS INDEX (BMI) OF 31.0 TO 31.9 IN ADULT: ICD-10-CM

## 2022-10-12 DIAGNOSIS — E66.811 CLASS 1 OBESITY DUE TO EXCESS CALORIES WITH SERIOUS COMORBIDITY AND BODY MASS INDEX (BMI) OF 31.0 TO 31.9 IN ADULT: ICD-10-CM

## 2022-10-12 LAB
ALBUMIN SERPL-MCNC: 4.4 G/DL (ref 3.4–5)
ALP SERPL-CCNC: 123 U/L (ref 40–150)
ALT SERPL W P-5'-P-CCNC: 35 U/L (ref 0–70)
ANION GAP SERPL CALCULATED.3IONS-SCNC: 5 MMOL/L (ref 3–14)
AST SERPL W P-5'-P-CCNC: 18 U/L (ref 0–45)
BILIRUB SERPL-MCNC: 0.5 MG/DL (ref 0.2–1.3)
BUN SERPL-MCNC: 11 MG/DL (ref 7–30)
CALCIUM SERPL-MCNC: 10.1 MG/DL (ref 8.5–10.1)
CHLORIDE BLD-SCNC: 103 MMOL/L (ref 94–109)
CO2 SERPL-SCNC: 27 MMOL/L (ref 20–32)
CREAT SERPL-MCNC: 1.04 MG/DL (ref 0.66–1.25)
GFR SERPL CREATININE-BSD FRML MDRD: 84 ML/MIN/1.73M2
GLUCOSE BLD-MCNC: 105 MG/DL (ref 70–99)
POTASSIUM BLD-SCNC: 4.4 MMOL/L (ref 3.4–5.3)
PROT SERPL-MCNC: 8.3 G/DL (ref 6.8–8.8)
SODIUM SERPL-SCNC: 135 MMOL/L (ref 133–144)

## 2022-10-12 PROCEDURE — 80053 COMPREHEN METABOLIC PANEL: CPT

## 2022-10-12 PROCEDURE — 36415 COLL VENOUS BLD VENIPUNCTURE: CPT

## 2022-10-12 NOTE — TELEPHONE ENCOUNTER
Patient is calling to get a referral to ENT for Inspire. Last visit was actually 9/21/2020. His 12/2021 encounter was just a telephone request for supplies. Patient is not understanding the need to see sleep provider before he is referred to ENT for an Inspire. He is asking if he can't just get the referral to expedite getting surgery. Told patient this CMA would check and get back to him.  Margarette Arana, KADE

## 2022-10-12 NOTE — PROGRESS NOTES
STM Recheck: Spoke with patient he wants to get Inspire by the end of year.  Told patient he has to see Dr Donahue first and Inspire is a long process to go through.

## 2022-10-18 ENCOUNTER — TELEPHONE (OUTPATIENT)
Dept: SURGERY | Facility: CLINIC | Age: 57
End: 2022-10-18

## 2022-10-18 NOTE — TELEPHONE ENCOUNTER
Pt is upset he was told to f/u the week after Thanksgiving and there is no available appts until [place]

## 2022-10-18 NOTE — TELEPHONE ENCOUNTER
Left  10/18, Left  10/20    Spoke with patient - explained that providers are booked 100% until January and Micha will be out on leave at the end of the year until March.  He was okay with scheduling with either Hien or Dr. Mcbride. Tried to transfer to call center to schedule but call dropped.

## 2022-10-26 NOTE — TELEPHONE ENCOUNTER
DIAGNOSIS: Left shoulder pain    APPOINTMENT DATE: 11/03/2022   NOTES STATUS DETAILS   OFFICE NOTE from referring provider N/A    OFFICE NOTE from other specialist Care Everywhere 11/10/2020 [facility]  02/10/2021 PT [facility]   DISCHARGE SUMMARY from hospital N/A    DISCHARGE REPORT from the ER Care Everywhere 10/21/2020 [facility]   OPERATIVE REPORT N/A    EMG report N/A    MEDICATION LIST N/A    MRI N/A    DEXA (osteoporosis/bone health) N/A    CT SCAN N/A    XRAYS (IMAGES & REPORTS) Received 10/21/2020 [facility] shoulder     Images in PACS

## 2022-11-01 DIAGNOSIS — J45.40 MODERATE PERSISTENT ASTHMA WITHOUT COMPLICATION: Chronic | ICD-10-CM

## 2022-11-01 DIAGNOSIS — M25.512 LEFT SHOULDER PAIN: Primary | ICD-10-CM

## 2022-11-01 DIAGNOSIS — N52.9 ERECTILE DYSFUNCTION, UNSPECIFIED ERECTILE DYSFUNCTION TYPE: Chronic | ICD-10-CM

## 2022-11-01 DIAGNOSIS — G25.81 RESTLESS LEGS SYNDROME (RLS): ICD-10-CM

## 2022-11-01 DIAGNOSIS — E78.5 HYPERLIPIDEMIA LDL GOAL <130: Chronic | ICD-10-CM

## 2022-11-01 RX ORDER — TADALAFIL 5 MG/1
TABLET ORAL
Qty: 90 TABLET | Refills: 0 | OUTPATIENT
Start: 2022-11-01

## 2022-11-01 RX ORDER — MONTELUKAST SODIUM 10 MG/1
TABLET ORAL
Qty: 90 TABLET | Refills: 0 | Status: SHIPPED | OUTPATIENT
Start: 2022-11-01 | End: 2023-01-20

## 2022-11-01 RX ORDER — ATORVASTATIN CALCIUM 80 MG/1
TABLET, FILM COATED ORAL
Qty: 90 TABLET | Refills: 0 | Status: SHIPPED | OUTPATIENT
Start: 2022-11-01 | End: 2023-01-20

## 2022-11-01 RX ORDER — ROPINIROLE 2 MG/1
TABLET, FILM COATED ORAL
Qty: 90 TABLET | Refills: 0 | Status: SHIPPED | OUTPATIENT
Start: 2022-11-01 | End: 2023-01-20

## 2022-11-03 ENCOUNTER — OFFICE VISIT (OUTPATIENT)
Dept: ORTHOPEDICS | Facility: CLINIC | Age: 57
End: 2022-11-03
Attending: FAMILY MEDICINE
Payer: COMMERCIAL

## 2022-11-03 ENCOUNTER — ANCILLARY PROCEDURE (OUTPATIENT)
Dept: GENERAL RADIOLOGY | Facility: CLINIC | Age: 57
End: 2022-11-03
Attending: FAMILY MEDICINE
Payer: COMMERCIAL

## 2022-11-03 ENCOUNTER — PRE VISIT (OUTPATIENT)
Dept: ORTHOPEDICS | Facility: CLINIC | Age: 57
End: 2022-11-03

## 2022-11-03 DIAGNOSIS — G89.29 CHRONIC LEFT SHOULDER PAIN: ICD-10-CM

## 2022-11-03 DIAGNOSIS — M25.512 CHRONIC LEFT SHOULDER PAIN: ICD-10-CM

## 2022-11-03 DIAGNOSIS — M25.512 LEFT SHOULDER PAIN: ICD-10-CM

## 2022-11-03 PROCEDURE — 99204 OFFICE O/P NEW MOD 45 MIN: CPT | Performed by: FAMILY MEDICINE

## 2022-11-03 PROCEDURE — 73030 X-RAY EXAM OF SHOULDER: CPT | Mod: LT | Performed by: RADIOLOGY

## 2022-11-03 NOTE — LETTER
11/3/2022         RE: Juan Treadwell  [address]  [place] [facility] [address]        Dear Colleague,    Thank you for referring your patient, Juan Treadwell, to the [facility] [place]. Please see a copy of my visit note below.    CHIEF COMPLAINT:  Pain of the Left Shoulder (Fell a couple years ago, did physical therapy for a bit which helped, pain is constant and achey worst at night)       HISTORY OF PRESENT ILLNESS  Mr. Treadwell is a pleasant 57 year old male who presents to clinic today with left shoulder pain.  Juan has pain in his left shoulder that is longstanding.  This has been worse over the past couple of years after he suffered a fall, he was taking out the trash when he slipped on some ice, breaking the fall with an outstretched left arm.  He points to the superior lateral aspect of his shoulder.  This is painful whenever he moves or rotates in certain ranges of motion.  He has had physical therapy in the past, unfortunately this only helped him slightly at the time.        Additional history: as documented    MEDICAL HISTORY  Patient Active Problem List   Diagnosis     DIAMOND (obstructive sleep apnea)- moderate (AHI 17)     Hyperlipidemia LDL goal <130     Allergic rhinitis, unspecified allergic rhinitis type     Male hypogonadism     Erectile dysfunction, unspecified erectile dysfunction type     Gastroesophageal reflux disease without esophagitis     Restless legs syndrome (RLS)     Moderate persistent asthma without complication     Nasal septal perforation     Class 1 obesity due to excess calories with serious comorbidity and body mass index (BMI) of 31.0 to 31.9 in adult       Current Outpatient Medications   Medication Sig Dispense Refill     albuterol (PROAIR HFA/PROVENTIL HFA/VENTOLIN HFA) 108 (90 Base) MCG/ACT inhaler Inhale 2 puffs into the lungs every 4 hours as needed for shortness of breath / dyspnea or wheezing 1 Inhaler 2     atorvastatin (LIPITOR) 80 MG  tablet TAKE ONE TABLET BY MOUTH ONCE DAILY 90 tablet 0     Cholecalciferol (VITAMIN D) 2000 UNITS tablet Take 2,000 Units by mouth       COMPOUNDED NON-CONTROLLED SUBSTANCE (CMPD RX) - PHARMACY TO MIX COMPOUNDED MEDICATION Apply 20 mLs into each nare 2 times daily Gentamicin/Dexamethasone 160/120mg/liter saline 2000 mL 6     famotidine (PEPCID) 40 MG tablet Take 1 tablet (40 mg) by mouth daily 90 tablet 3     insulin pen needle (31G X 5 MM) 31G X 5 MM miscellaneous Use 1 pen needles daily or as directed. 100 each 1     liraglutide - Weight Management (SAXENDA) 18 MG/3ML pen Week 1: 0.6 mg subcutaneous daily, Week 2: 1.2 mg daily, Week 3: 1.8 mg daily, Week 4: 2.4 mg daily, Week 5 & on: 3 mg daily 15 mL 1     montelukast (SINGULAIR) 10 MG tablet TAKE ONE TABLET BY MOUTH AT BEDTIME 90 tablet 0     Multiple Vitamins-Minerals (MULTIVITAMIN OR)        order for DME Equipment being ordered: 1 cc syringe, with 23 gauge 1 inch needles  Use twice monthly 10 Units 5     order for DME Resmed Airsense 10 auto cpap 10-14 cm, Mirage Fx nasal mask std       phentermine (ADIPEX-P) 15 MG capsule Take 1 capsule (15 mg) by mouth every morning 60 capsule 0     phentermine (ADIPEX-P) 37.5 MG tablet Take 1/2 tab in AM x 7-10 days. If ok can increase to 1 tab daily 30 tablet 0     rOPINIRole (REQUIP) 2 MG tablet TAKE ONE TABLET BY MOUTH AT BEDTIME 90 tablet 0     tadalafil (CIALIS) 5 MG tablet TAKE ONE TABLET BY MOUTH ONCE DAILY 90 tablet 0       Allergies   Allergen Reactions     Codeine Nausea and Vomiting       Family History   Problem Relation Age of Onset     Crohn's Disease Brother      Depression Brother      Prostate Cancer Father      Diabetes Father 80     Breast Cancer Sister      Breast Cancer Mother      Thyroid Disease Brother      Anesthesia Reaction No family hx of      Colon Polyps No family hx of      Ulcerative Colitis No family hx of      Cancer - colorectal No family hx of      Coronary Artery Disease No family hx of         Additional medical/Social/Surgical histories reviewed in [facility] and updated as appropriate.        PHYSICAL EXAM  General  - normal appearance, in no obvious distress  Musculoskeletal - left shoulder  - inspection: normal bone and joint alignment, no obvious deformity, no scapular winging, no AC step-off  - palpation: mildly tender AC  - ROM:  Painful end range flexion  - strength: 5/5  strength, 5/5 in all shoulder planes  - special tests:  (-) Speed's  (+) Neer  (+) Hawkin's  (-) Dana's  Neuro  - no sensory or motor deficit, grossly normal coordination, normal muscle tone                 ASSESSMENT & PLAN  Mr. Treadwell is a 57 year old male who presents to clinic today with chronic left shoulder pain.    I ordered and independently reviewed an x-ray of his shoulder, this shows enthesophytosis at the superior lateral aspect of the humeral head, along with mild narrowing of the subacromial interval.    His symptoms are consistent with a rotator cuff issue, given his lack of improvement with physical therapy I do think an MRI would be reasonable.  We also discussed revisiting physical therapy, which I do think will help to matter what with that short-term symptoms.  He is interested in both of these.    It was a pleasure seeing Juan today.    Juan Faye DO, [facility]  Primary Care Sports Medicine      This note was constructed using Dragon dictation software, please excuse any minor errors in spelling, grammar, or syntax.        Again, thank you for allowing me to participate in the care of your patient.        Sincerely,        Juan Faye DO

## 2022-11-03 NOTE — PROGRESS NOTES
CHIEF COMPLAINT:  Pain of the Left Shoulder (Fell a couple years ago, did physical therapy for a bit which helped, pain is constant and achey worst at night)       HISTORY OF PRESENT ILLNESS  Mr. Treadwell is a pleasant 57 year old male who presents to clinic today with left shoulder pain.  Juan has pain in his left shoulder that is longstanding.  This has been worse over the past couple of years after he suffered a fall, he was taking out the trash when he slipped on some ice, breaking the fall with an outstretched left arm.  He points to the superior lateral aspect of his shoulder.  This is painful whenever he moves or rotates in certain ranges of motion.  He has had physical therapy in the past, unfortunately this only helped him slightly at the time.        Additional history: as documented    MEDICAL HISTORY  Patient Active Problem List   Diagnosis     DIAMOND (obstructive sleep apnea)- moderate (AHI 17)     Hyperlipidemia LDL goal <130     Allergic rhinitis, unspecified allergic rhinitis type     Male hypogonadism     Erectile dysfunction, unspecified erectile dysfunction type     Gastroesophageal reflux disease without esophagitis     Restless legs syndrome (RLS)     Moderate persistent asthma without complication     Nasal septal perforation     Class 1 obesity due to excess calories with serious comorbidity and body mass index (BMI) of 31.0 to 31.9 in adult       Current Outpatient Medications   Medication Sig Dispense Refill     albuterol (PROAIR HFA/PROVENTIL HFA/VENTOLIN HFA) 108 (90 Base) MCG/ACT inhaler Inhale 2 puffs into the lungs every 4 hours as needed for shortness of breath / dyspnea or wheezing 1 Inhaler 2     atorvastatin (LIPITOR) 80 MG tablet TAKE ONE TABLET BY MOUTH ONCE DAILY 90 tablet 0     Cholecalciferol (VITAMIN D) 2000 UNITS tablet Take 2,000 Units by mouth       COMPOUNDED NON-CONTROLLED SUBSTANCE (CMPD RX) - PHARMACY TO MIX COMPOUNDED MEDICATION Apply 20 mLs into each nare 2 times daily  Gentamicin/Dexamethasone 160/120mg/liter saline 2000 mL 6     famotidine (PEPCID) 40 MG tablet Take 1 tablet (40 mg) by mouth daily 90 tablet 3     insulin pen needle (31G X 5 MM) 31G X 5 MM miscellaneous Use 1 pen needles daily or as directed. 100 each 1     liraglutide - Weight Management (SAXENDA) 18 MG/3ML pen Week 1: 0.6 mg subcutaneous daily, Week 2: 1.2 mg daily, Week 3: 1.8 mg daily, Week 4: 2.4 mg daily, Week 5 & on: 3 mg daily 15 mL 1     montelukast (SINGULAIR) 10 MG tablet TAKE ONE TABLET BY MOUTH AT BEDTIME 90 tablet 0     Multiple Vitamins-Minerals (MULTIVITAMIN OR)        order for DME Equipment being ordered: 1 cc syringe, with 23 gauge 1 inch needles  Use twice monthly 10 Units 5     order for DME Resmed Airsense 10 auto cpap 10-14 cm, Mirage Fx nasal mask std       phentermine (ADIPEX-P) 15 MG capsule Take 1 capsule (15 mg) by mouth every morning 60 capsule 0     phentermine (ADIPEX-P) 37.5 MG tablet Take 1/2 tab in AM x 7-10 days. If ok can increase to 1 tab daily 30 tablet 0     rOPINIRole (REQUIP) 2 MG tablet TAKE ONE TABLET BY MOUTH AT BEDTIME 90 tablet 0     tadalafil (CIALIS) 5 MG tablet TAKE ONE TABLET BY MOUTH ONCE DAILY 90 tablet 0       Allergies   Allergen Reactions     Codeine Nausea and Vomiting       Family History   Problem Relation Age of Onset     Crohn's Disease Brother      Depression Brother      Prostate Cancer Father      Diabetes Father 80     Breast Cancer Sister      Breast Cancer Mother      Thyroid Disease Brother      Anesthesia Reaction No family hx of      Colon Polyps No family hx of      Ulcerative Colitis No family hx of      Cancer - colorectal No family hx of      Coronary Artery Disease No family hx of        Additional medical/Social/Surgical histories reviewed in [facility] and updated as appropriate.        PHYSICAL EXAM  General  - normal appearance, in no obvious distress  Musculoskeletal - left shoulder  - inspection: normal bone and joint alignment, no obvious  deformity, no scapular winging, no AC step-off  - palpation: mildly tender AC  - ROM:  Painful end range flexion  - strength: 5/5  strength, 5/5 in all shoulder planes  - special tests:  (-) Speed's  (+) Neer  (+) Hawkin's  (-) Dana's  Neuro  - no sensory or motor deficit, grossly normal coordination, normal muscle tone                 ASSESSMENT & PLAN  Mr. Treadwell is a 57 year old male who presents to clinic today with chronic left shoulder pain.    I ordered and independently reviewed an x-ray of his shoulder, this shows enthesophytosis at the superior lateral aspect of the humeral head, along with mild narrowing of the subacromial interval.    His symptoms are consistent with a rotator cuff issue, given his lack of improvement with physical therapy I do think an MRI would be reasonable.  We also discussed revisiting physical therapy, which I do think will help to matter what with that short-term symptoms.  He is interested in both of these.    It was a pleasure seeing Juan today.    Juan Faye DO, [facility]  Primary Care Sports Medicine      This note was constructed using Dragon dictation software, please excuse any minor errors in spelling, grammar, or syntax.

## 2022-11-17 ENCOUNTER — THERAPY VISIT (OUTPATIENT)
Dept: PHYSICAL THERAPY | Facility: CLINIC | Age: 57
End: 2022-11-17
Attending: FAMILY MEDICINE
Payer: COMMERCIAL

## 2022-11-17 DIAGNOSIS — M25.512 CHRONIC LEFT SHOULDER PAIN: ICD-10-CM

## 2022-11-17 DIAGNOSIS — G89.29 CHRONIC LEFT SHOULDER PAIN: ICD-10-CM

## 2022-11-17 PROCEDURE — 97110 THERAPEUTIC EXERCISES: CPT | Mod: GP | Performed by: PHYSICAL THERAPIST

## 2022-11-17 PROCEDURE — 97112 NEUROMUSCULAR REEDUCATION: CPT | Mod: GP | Performed by: PHYSICAL THERAPIST

## 2022-11-17 PROCEDURE — 97161 PT EVAL LOW COMPLEX 20 MIN: CPT | Mod: GP | Performed by: PHYSICAL THERAPIST

## 2022-11-17 NOTE — PROGRESS NOTES
Physical Therapy Initial Evaluation  Subjective:    Patient Health History  Juan Treadwell being seen for chronic L shoulder pain.     Problem began: 11/3/2022 (MD visit).   Problem occurred: 2 years ago fell on ice and produced L shoulder pain, had physical therapy that helped mildly but never fully went away.  2 months ago insidious worsening pain especially at night.   Pain is reported as 8/10 on pain scale.  General health as reported by patient is good.  Pertinent medical history includes: asthma, overweight and sleep disorder/apnea.   Red flags:  Pain at rest/night.  Medical allergies: other. Other medical allergies details: codeine.    Other surgery history details: fistula.    Current medications:  Anti-inflammatory. Other medications details: tylenol, advil.    Current occupation is  IT.   Primary job tasks include:  Computer work and prolonged sitting.                  Therapist Generated HPI Evaluation  Problem details: Torn rotator cuff on right shoulder due to previously injury.  Sometimes has pain in R shoulder as well but not as bad as left.    Eat and write left handed and all sports right handed..         Type of problem:  Left shoulder.    This is a chronic condition.      Patient reports pain:  Lateral, posterior and upper arm.  Pain is described as aching, sharp and shooting and is intermittent (mostly constant ache).  Pain radiates to:  Cervical (B neck pain and knot in L UT region). Pain is worse during the night (mostly same all the time but overall worst at night).  Since onset symptoms are gradually worsening.  Associated symptoms:  Loss of motion/stiffness, catching and loss of strength (mild painful catch/crack, very intermittent tingling upper arm). Symptoms are exacerbated by lying on extremity and using arm behind back (lying down is the worst, losing 1 our of sleep per night, pickle ball)  and relieved by NSAID's and analgesics (biofreeze patches).  Special tests  included:  X-ray (xray normal joint space, no fracture-enthesophytosis at superior lateral aspect of humeral head, mild narrowing of subacromial interval).  Previous treatment includes physical therapy. There was mild improvement following previous treatment.  Restrictions due to condition include:  Working in normal job without restrictions.  Barriers include:  None as reported by patient (lives with wife and son).                        Objective:  System              Cervical/Thoracic Evaluation  Arom wnl cervical: cervical retraction no loss.     AROM:  AROM Cervical:    Flexion:            No loss  Extension:       Mod loss  Rotation:         Left: min loss L proximal UT and lateral L shoulder pain     Right: min loss  Side Bend:      Left: min loss contralateral stretching     Right:  Min loss                               Shoulder Evaluation:  ROM:  AROM:    Flexion:  Left:  145 + strain/tightness    Right:  150    Abduction:  Left: 145 + painful arc   Right:  145 + painful arc      External Rotation:  Left:  54    Right:  58            Extension/Internal Rotation:  Left:  T7    Right:  T7          Strength:  : MMT supraspinatus empty can L 5-/5 mild pain, R 5/5, full can L 5-/5 no pain, R 5/5.  Flexion: Left:4/5 Weak/painful    Pain:    Right: 5/5     Pain:     Abduction:  Left: 5/5  Pain:    Right: 5/5     Pain:    Internal Rotation:  Left:5/5     Pain:    Right: 5/5     Pain:  External Rotation:   Left:4/5     Pain:   Right:4+/5     Pain:        Elbow Flexion:  Left:5/5   Strong/painful    Pain:    Right:5/5     Pain:  Elbow Extension:  Left:5/5     Pain:    Right:5/5     Pain:    Special Tests:  Special tests assessed shoulder: (-) B speeds.  Left shoulder positive for the following special tests:  Impingement (mild (+) De La Torre, (+) horizontal ADD, (-) neer)  Left shoulder negative for the following special tests:  Rotator cuff tear    Right shoulder negative for the following special tests:Impingement  and Rotator cuff tear                                       General     ROS    Assessment/Plan:    Patient is a 57 year old male with left side shoulder complaints consistent with rotator cuff impingement.  Potential cervical involvement as well (vs just tight upper trapezius due to shoulder weakness)    Patient has the following significant findings with corresponding treatment plan.                Diagnosis 1:  L shoulder pain    Pain -  hot/cold therapy, manual therapy, self management, education and home program  Decreased ROM/flexibility - manual therapy, therapeutic exercise, therapeutic activity and home program  Decreased strength - therapeutic exercise, therapeutic activities and home program  Inflammation - cold therapy and self management/home program  Decreased function - therapeutic activities and home program  Impaired posture - neuro re-education, therapeutic activities and home program    Therapy Evaluation Codes:       Cumulative Therapy Evaluation is: Low complexity.    Previous and current functional limitations:  (See Goal Flow Sheet for this information)    Short term and Long term goals: (See Goal Flow Sheet for this information)     Communication ability:  Patient appears to be able to clearly communicate and understand verbal and written communication and follow directions correctly.  Treatment Explanation - The following has been discussed with the patient:   RX ordered/plan of care  Anticipated outcomes  Possible risks and side effects  This patient would benefit from PT intervention to resume normal activities.   Rehab potential is good.    Frequency:  1 X week, once daily  Duration:  for 8 weeks  Discharge Plan:  Achieve all LTG.  Independent in home treatment program.  Reach maximal therapeutic benefit.    Please refer to the daily flowsheet for treatment today, total treatment time and time spent performing 1:1 timed codes.

## 2022-11-29 ENCOUNTER — ANCILLARY PROCEDURE (OUTPATIENT)
Dept: MRI IMAGING | Facility: CLINIC | Age: 57
End: 2022-11-29
Attending: FAMILY MEDICINE
Payer: COMMERCIAL

## 2022-11-29 DIAGNOSIS — M25.512 CHRONIC LEFT SHOULDER PAIN: ICD-10-CM

## 2022-11-29 DIAGNOSIS — G89.29 CHRONIC LEFT SHOULDER PAIN: ICD-10-CM

## 2022-11-29 PROCEDURE — 73221 MRI JOINT UPR EXTREM W/O DYE: CPT | Mod: LT | Performed by: RADIOLOGY

## 2022-12-07 NOTE — TELEPHONE ENCOUNTER
DIAGNOSIS: left shoulder  rotator cuff surg consult    APPOINTMENT DATE: 01/02/2023   NOTES STATUS DETAILS   OFFICE NOTE from referring provider Internal 11/03/2022 Dr Faye [facility]    OFFICE NOTE from other specialist Internal 11/17/2022 PT [facility]   11/10/2020 [facility]  02/10/2021 PT [facility]   DISCHARGE SUMMARY from hospital N/A    DISCHARGE REPORT from the ER Care Everywhere 10/21/2020 [facility]   OPERATIVE REPORT N/A    MEDICATION LIST N/A    EMG (for Spine) N/A    IMPLANT RECORD/STICKER N/A    LABS     CBC/DIFF N/A    CULTURES N/A    INJECTIONS DONE IN RADIOLOGY N/A    MRI Internal 11/29/2022 LFT shoulder   CT SCAN N/A    XRAYS (IMAGES & REPORTS) Internal 11/03/2022 LFT shoulder   TUMOR     PATHOLOGY  Slides & report N/A

## 2022-12-22 ENCOUNTER — VIRTUAL VISIT (OUTPATIENT)
Dept: SLEEP MEDICINE | Facility: CLINIC | Age: 57
End: 2022-12-22
Payer: COMMERCIAL

## 2022-12-22 VITALS — BODY MASS INDEX: 31.1 KG/M2 | HEIGHT: 69 IN | WEIGHT: 210 LBS

## 2022-12-22 DIAGNOSIS — G25.81 RESTLESS LEGS SYNDROME (RLS): ICD-10-CM

## 2022-12-22 DIAGNOSIS — G47.33 OSA (OBSTRUCTIVE SLEEP APNEA): Primary | Chronic | ICD-10-CM

## 2022-12-22 PROBLEM — E66.09 CLASS 1 OBESITY DUE TO EXCESS CALORIES WITH SERIOUS COMORBIDITY AND BODY MASS INDEX (BMI) OF 31.0 TO 31.9 IN ADULT: Chronic | Status: ACTIVE | Noted: 2022-08-03

## 2022-12-22 PROBLEM — M54.50 LOW BACK PAIN: Status: ACTIVE | Noted: 2017-08-23

## 2022-12-22 PROBLEM — G89.29 CHRONIC LEFT SHOULDER PAIN: Status: RESOLVED | Noted: 2022-11-17 | Resolved: 2022-12-22

## 2022-12-22 PROBLEM — N20.0 KIDNEY STONES: Status: RESOLVED | Noted: 2022-12-22 | Resolved: 2022-12-22

## 2022-12-22 PROBLEM — M54.50 LOW BACK PAIN: Status: RESOLVED | Noted: 2017-08-23 | Resolved: 2022-12-22

## 2022-12-22 PROBLEM — M25.512 CHRONIC LEFT SHOULDER PAIN: Status: RESOLVED | Noted: 2022-11-17 | Resolved: 2022-12-22

## 2022-12-22 PROBLEM — E66.811 CLASS 1 OBESITY DUE TO EXCESS CALORIES WITH SERIOUS COMORBIDITY AND BODY MASS INDEX (BMI) OF 31.0 TO 31.9 IN ADULT: Chronic | Status: ACTIVE | Noted: 2022-08-03

## 2022-12-22 PROBLEM — N20.0 KIDNEY STONES: Status: ACTIVE | Noted: 2022-12-22

## 2022-12-22 PROCEDURE — 99214 OFFICE O/P EST MOD 30 MIN: CPT | Mod: 95 | Performed by: INTERNAL MEDICINE

## 2022-12-22 ASSESSMENT — PAIN SCALES - GENERAL: PAINLEVEL: NO PAIN (0)

## 2022-12-22 ASSESSMENT — SLEEP AND FATIGUE QUESTIONNAIRES
HOW LIKELY ARE YOU TO NOD OFF OR FALL ASLEEP IN A CAR, WHILE STOPPED FOR A FEW MINUTES IN TRAFFIC: SLIGHT CHANCE OF DOZING
HOW LIKELY ARE YOU TO NOD OFF OR FALL ASLEEP WHILE SITTING AND READING: HIGH CHANCE OF DOZING
HOW LIKELY ARE YOU TO NOD OFF OR FALL ASLEEP WHEN YOU ARE A PASSENGER IN A CAR FOR AN HOUR WITHOUT A BREAK: SLIGHT CHANCE OF DOZING
HOW LIKELY ARE YOU TO NOD OFF OR FALL ASLEEP WHILE WATCHING TV: SLIGHT CHANCE OF DOZING
HOW LIKELY ARE YOU TO NOD OFF OR FALL ASLEEP WHILE SITTING AND TALKING TO SOMEONE: SLIGHT CHANCE OF DOZING
HOW LIKELY ARE YOU TO NOD OFF OR FALL ASLEEP WHILE SITTING QUIETLY AFTER LUNCH WITHOUT ALCOHOL: SLIGHT CHANCE OF DOZING
HOW LIKELY ARE YOU TO NOD OFF OR FALL ASLEEP WHILE LYING DOWN TO REST IN THE AFTERNOON WHEN CIRCUMSTANCES PERMIT: HIGH CHANCE OF DOZING
HOW LIKELY ARE YOU TO NOD OFF OR FALL ASLEEP WHILE SITTING INACTIVE IN A PUBLIC PLACE: SLIGHT CHANCE OF DOZING

## 2022-12-22 NOTE — PROGRESS NOTES
STEPHANI is a 57 year old who is being evaluated via a billable video visit.      How would you like to obtain your AVS? MyChart  If the video visit is dropped, the invitation should be resent by: Text to cell phone: [phone]  Will anyone else be joining your video visit? No    Man Faye      Video-Visit Details    Type of service:  Video Visit     Originating Location (pt. Location): Home    Distant Location (provider location):  Off-site  Platform used for Video Visit: Daojia         9:57 AM   10:25 AM       Sleep Apnea - Follow-up Visit:    Impression/Plan:     Moderate Sleep apnea.   Tolerating PAP well.   - Continue current treatments.   - Get download, Rx for refills after I see that   - Ruiire discussed    Restless leg syndrome  Low ferritin  SSRIS, antihistamines/ tricyclic antidepressants (zyrtec), antipsychotics, and low iron levels can cause or worsen restless leg syndrome symptoms. Managed by Kelli Porter   - We discussed Augmentation  - Check ferritin     Obesity  We discussed the link between obesity, sleep apnea   5-10 # weight gain since diagnosis  - See patient instructions         Stephani Treadwell will follow up in about 2 year(s).     I spent 25 minutes with patient including counseling, and 10 minutes with chart review, and documentation         History of Present Illness:  Chief Complaint   Patient presents with     Video Visit       DME [facility]     Stephani JEFFERSON Treadwell for follow-up of their moderate sleep apnea, managed with CPAP.     He initially presented to [facility] in 2011 with snoring. He denies daytime symptoms. He was diagnosed with moderate obstructive sleep apnea and was started on CPAP.     Sleep Study 10/9/2011 [facility] (202#)- AHI 17, worse supine, RDI 30, low O2 86%, PLMI 45  Sleep Study 12/5/2011 [facility] (202#)- CPAP 10 cm effective    He had follow-up with Dr. Juarez in 2011.     He presented to [facility] in 2019 for continuing care with break-through  snoring, sleep maintenance difficulties, nocturnal reflux, excessive daytime sleepiness (ESS 12), machine malfunction. Recommended new machine with autoPAP 10-14 cm.      Mask leaks on his side    What type of mask do you use:  nasal     What is your typical bedtime:  11-12, later on weekends   What time do you typically get out of bed for the day:  630, weekends 8-830    He has trouble falling asleep because of tingling in his legs and urge to move   He sometimes feels a 'thump' with his breaths in certain positions     He takes ropinirole, sometimes takes 2  Has sleep onset difficulties 2-3 nights a week   Rare daytime symptoms   Has had restless leg syndrome symptoms for several years  On ropinirole for 2 years, prescribed 1/2, then now taking 2 mg, and occasionally 4 mg  2 mg has worked most of the time since he started     No sleep maintenance difficulties       ResMed   CPAP  usage data: no data available since 3/3022          EPWORTH SLEEPINESS SCALE      Willard Sleepiness Scale ( RAJAN Chan  1990-1997[facility] [place]/English - Final version - 21 Nov 07 - [facility] [place].) 12/22/2022   Sitting and reading High chance of dozing   Watching TV Slight chance of dozing   Sitting, inactive in a public place (e.g. a theatre or a meeting) Slight chance of dozing   As a passenger in a car for an hour without a break Slight chance of dozing   Lying down to rest in the afternoon when circumstances permit High chance of dozing   Sitting and talking to someone Slight chance of dozing   Sitting quietly after a lunch without alcohol Slight chance of dozing   In a car, while stopped for a few minutes in traffic Slight chance of dozing   Willard Score (MC) 12   Willard Score (Sleep) 12       INSOMNIA SEVERITY INDEX (GORAN)      Insomnia Severity Index (GORAN) 12/22/2022   Difficulty falling asleep 2   Difficulty staying asleep 0   Problems waking up too early 0   How SATISFIED/DISSATISFIED are you with your CURRENT sleep  pattern? 2   How NOTICEABLE to others do you think your sleep problem is in terms of impairing the quality of your life? 2   How WORRIED/DISTRESSED are you about your current sleep problem? 1   To what extent do you consider your sleep problem to INTERFERE with your daily functioning (e.g. daytime fatigue, mood, ability to function at work/daily chores, concentration, memory, mood, etc.) CURRENTLY? 0   GORAN Total Score 7       Guidelines for Scoring/Interpretation:  Total score categories:  0-7 = No clinically significant insomnia   8-14 = Subthreshold insomnia   15-21 = Clinical insomnia (moderate severity)  22-28 = Clinical insomnia (severe)  Used via courtesy of www.ebooxter.comealth.va.gov with permission from Devyn Gary PhD., [facility]        Past medical/surgical history, family history, social history, medications and allergies were reviewed.      Current Outpatient Medications   Medication     albuterol (PROAIR HFA/PROVENTIL HFA/VENTOLIN HFA) 108 (90 Base) MCG/ACT inhaler     atorvastatin (LIPITOR) 80 MG tablet     Cholecalciferol (VITAMIN D) 2000 UNITS tablet     COMPOUNDED NON-CONTROLLED SUBSTANCE (CMPD RX) - PHARMACY TO MIX COMPOUNDED MEDICATION     famotidine (PEPCID) 40 MG tablet     insulin pen needle (31G X 5 MM) 31G X 5 MM miscellaneous     montelukast (SINGULAIR) 10 MG tablet     Multiple Vitamins-Minerals (MULTIVITAMIN OR)     order for DME     order for DME     rOPINIRole (REQUIP) 2 MG tablet     SAXENDA 18 MG/3ML pen     tadalafil (CIALIS) 5 MG tablet     phentermine (ADIPEX-P) 15 MG capsule     phentermine (ADIPEX-P) 37.5 MG tablet     No current facility-administered medications for this visit.          Problem List:  Patient Active Problem List    Diagnosis Date Noted     Moderate persistent asthma without complication 01/14/2016     Priority: Medium     DIAMOND (obstructive sleep apnea)- moderate (AHI 17) 11/06/2015     Priority: Medium     ve       Hyperlipidemia LDL goal <130 11/06/2015      "Priority: Medium     Chronic left shoulder pain 11/17/2022     Priority: Low     Class 1 obesity due to excess calories with serious comorbidity and body mass index (BMI) of 31.0 to 31.9 in adult 08/03/2022     Priority: Low     Nasal septal perforation 10/28/2021     Priority: Low     Impingement syndrome of left shoulder 11/10/2020     Priority: Low     Restless legs syndrome (RLS) 12/02/2015     Priority: Low     Gastroesophageal reflux disease without esophagitis      Priority: Low     Erectile dysfunction, unspecified erectile dysfunction type 11/16/2015     Priority: Low     Allergic rhinitis, unspecified allergic rhinitis type 11/06/2015     Priority: Low     Male hypogonadism 11/06/2015     Priority: Low        Ht 1.753 m (5' 9\")   Wt 95.3 kg (210 lb)   BMI 31.01 kg/m     "

## 2022-12-22 NOTE — PROGRESS NOTES
Please refer to initial evaluation for D/C report as patient never returned to therapy.  Patient will be seeing surgeon for potential surgery.

## 2022-12-22 NOTE — PATIENT INSTRUCTIONS

## 2022-12-28 DIAGNOSIS — M25.512 LEFT SHOULDER PAIN: Primary | ICD-10-CM

## 2023-01-02 ENCOUNTER — PRE VISIT (OUTPATIENT)
Dept: ORTHOPEDICS | Facility: CLINIC | Age: 58
End: 2023-01-02

## 2023-01-02 ENCOUNTER — OFFICE VISIT (OUTPATIENT)
Dept: ORTHOPEDICS | Facility: CLINIC | Age: 58
End: 2023-01-02
Payer: COMMERCIAL

## 2023-01-02 ENCOUNTER — ANCILLARY PROCEDURE (OUTPATIENT)
Dept: GENERAL RADIOLOGY | Facility: CLINIC | Age: 58
End: 2023-01-02
Attending: ORTHOPAEDIC SURGERY
Payer: COMMERCIAL

## 2023-01-02 VITALS — BODY MASS INDEX: 31.21 KG/M2 | WEIGHT: 218 LBS | HEIGHT: 70 IN

## 2023-01-02 DIAGNOSIS — M75.122 COMPLETE TEAR OF LEFT ROTATOR CUFF, UNSPECIFIED WHETHER TRAUMATIC: Primary | ICD-10-CM

## 2023-01-02 DIAGNOSIS — M25.512 LEFT SHOULDER PAIN: ICD-10-CM

## 2023-01-02 PROCEDURE — 73020 X-RAY EXAM OF SHOULDER: CPT | Mod: LT | Performed by: RADIOLOGY

## 2023-01-02 PROCEDURE — 99203 OFFICE O/P NEW LOW 30 MIN: CPT | Performed by: ORTHOPAEDIC SURGERY

## 2023-01-02 ASSESSMENT — PAIN SCALES - GENERAL: PAINLEVEL: MILD PAIN (3)

## 2023-01-02 NOTE — NURSING NOTE
"Reason For Visit:   Chief Complaint   Patient presents with     Consult     Possible surgery for L shoulder RC repair.        PCP: Kelli Porter    ?  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  Work status?  Full time.  Date of injury: 10/2019/2020  Type of injury: Fall  .  Date of surgery: None   Type of surgery: None.  Smoker: No  Request smoking cessation information: No    Pain is worst at night, rolling over and grabbing blankets.      Ambi. Write/eat Left hand dominant    SANE score  Affected shoulder: Left  Right shoulder SANE: 60   Left shoulder SANE: 50-60    Ht 1.765 m (5' 9.5\")   Wt 98.9 kg (218 lb)   BMI 31.73 kg/m      Madhu Reynolds ATC    "

## 2023-01-02 NOTE — PROGRESS NOTES
CHIEF CONCERN:  Left shoulder pain    HISTORY OF PRESENT ILLNESS:  Mr. Treadwell is a 57 year old LHD man who is referred for L shoulder pain. He notes that he had an injury in Oct 2020 and went to PT at [facility] after the injury. He got better and it was manageable but it has been worse in recent months. He saw Dr. Faye in Nov 2022 and an MRI was obtained. He was referred for those findings. He notes his primary problem is pain at night. He says today that he feels his shoulder is not so bad he would pursue a surgery (he would like to avoid surgery).     Past Medical History:   Diagnosis Date     Anal fistula      Impingement syndrome of left shoulder 11/10/2020     Kidney stones 2016 2016 One episode     Low back pain 08/23/2017    Herniated disk     Uncomplicated asthma        Past Surgical History:   Procedure Laterality Date     COLONOSCOPY N/A 12/16/2014    Procedure: COLONOSCOPY;  Surgeon: Raz Ortiz MD;  Location: [facility] OR     EXAM UNDER ANESTHESIA, FISTULOTOMY RECTUM, COMBINED N/A 12/16/2014    Procedure: COMBINED EXAM UNDER ANESTHESIA, FISTULOTOMY RECTUM;  Surgeon: Raz Ortiz MD;  Location: [facility] OR     [facility]         Current Outpatient Medications   Medication Sig Dispense Refill     albuterol (PROAIR HFA/PROVENTIL HFA/VENTOLIN HFA) 108 (90 Base) MCG/ACT inhaler Inhale 2 puffs into the lungs every 4 hours as needed for shortness of breath / dyspnea or wheezing 1 Inhaler 2     atorvastatin (LIPITOR) 80 MG tablet TAKE ONE TABLET BY MOUTH ONCE DAILY 90 tablet 0     Cholecalciferol (VITAMIN D) 2000 UNITS tablet Take 2,000 Units by mouth       COMPOUNDED NON-CONTROLLED SUBSTANCE (CMPD RX) - PHARMACY TO MIX COMPOUNDED MEDICATION Apply 20 mLs into each nare 2 times daily Gentamicin/Dexamethasone 160/120mg/liter saline 2000 mL 6     famotidine (PEPCID) 40 MG tablet Take 1 tablet (40 mg) by mouth daily 90 tablet 3     insulin pen needle (31G X 5 MM) 31G X 5 MM  miscellaneous Use 1 pen needles daily or as directed. 100 each 1     montelukast (SINGULAIR) 10 MG tablet TAKE ONE TABLET BY MOUTH AT BEDTIME 90 tablet 0     Multiple Vitamins-Minerals (MULTIVITAMIN OR)        order for DME Equipment being ordered: 1 cc syringe, with 23 gauge 1 inch needles  Use twice monthly 10 Units 5     order for DME Resmed Airsense 10 auto cpap 10-14 cm, Mirage Fx nasal mask std       rOPINIRole (REQUIP) 2 MG tablet TAKE ONE TABLET BY MOUTH AT BEDTIME 90 tablet 0     SAXENDA 18 MG/3ML pen WEEK 1: INJECT 0.6MG SUB-Q DAILY. WEEK 2: 1.2MG DAILY. WEEK 3: 1.8MG DAILY. WEEK 4: 2.4MG DAILY. WEEK 5 & ON INJECT 3MG DAILY. 6 mL 0     tadalafil (CIALIS) 5 MG tablet TAKE ONE TABLET BY MOUTH ONCE DAILY 90 tablet 0          Allergies   Allergen Reactions     Codeine Nausea and Vomiting       SOCIAL HISTORY:    Social History     Socioeconomic History     Marital status:      Spouse name: Not on file     Number of children: Not on file     Years of education: Not on file     Highest education level: Not on file   Occupational History     Occupation:  IT   Tobacco Use     Smoking status: Never     Smokeless tobacco: Never     Tobacco comments:     no 2nd hand smoke exposure   Substance and Sexual Activity     Alcohol use: Yes     Alcohol/week: 3.0 - 4.0 standard drinks     Comment: 3-4 drinks weekly     Drug use: Never     Sexual activity: Yes     Partners: Female     Birth control/protection: I.U.D.   Other Topics Concern     Parent/sibling w/ CABG, MI or angioplasty before 65F 55M? No   Social History Narrative     Not on file     Social Determinants of Health     Financial Resource Strain: Not on file   Food Insecurity: Not on file   Transportation Needs: Not on file   Physical Activity: Not on file   Stress: Not on file   Social Connections: Not on file   Intimate Partner Violence: Not on file   Housing Stability: Not on file       FAMILY HISTORY: Reviewed in EMR      REVIEW OF  SYSTEMS: Positive for that noted in past medical history and history of present illness and otherwise reviewed in EMR     PHYSICAL EXAM:    Adult male in no acute distress. Articulates and communicates with normal affect.  Respirations even and unlabored  Focused upper extremity exam: Skin intact. No erythema. Sensation intact all dermatomes into the hand to light touch. EPL, FPL, and Intrinsics intact. Right shoulder active motion is FE to 175, ER at side to 75, and IR to L5. Left shoulder active motion is FE to 170, ER to 70, and IR to L2.  On the L he has pain with Neer and De La Torre. No pain on palpation over the AC joint. Mod pain on palpation over the long head of the biceps. Pain with OBriens. Some discomfort with Speeds. Strength in FE and ER is 5-/5.    IMAGING:  Left shoulder XR demonstrates no arthrosis or fracture    Left shoulder MRI dated 11/29/22 was reviewed and I agree with the Impression below:  Impression:  1. Rotator cuff:  *  Complete tear of the supraspinatus with medial retraction to the  level of the center of the humeral head.   *  Moderate grade intrasubstance tearing of the mid infraspinatus at  the footprint.   *  Subscapularis tendinosis with low to moderate grade intrasubstance  tearing of the upper fibers at the footprint.  *  Mild fatty infiltration of the supraspinatus muscle belly.  2. Thinning of cartilage of the superior humeral head.  3. Irregular area of increased fluid signal within the superior medial  humeral head suspicious for a small area of avascular necrosis.    ASSESSMENT:    1. Left full thickness rotator cuff tear with retraction and atrophy  2. Left humeral chondral loss/thinning    PLAN:  I reviewed the imaging and exam findings with the patient. We discussed the specifics of a potential rotator cuff repair with likely allograft augmentation. We discussed surgical options for an irreparable cuff tear. The patient had the opportunity for questions to be answered. He  "states again that he feels his shoulder is \"not so bad\" and he would like to avoid surgery. He prefers instead to focus on a home exercise program. He will follow up as needed.     Nicole Weeks MD    "

## 2023-01-02 NOTE — LETTER
1/2/2023         RE: Juan Treadwell  [address]  [facility] [address]        Dear Colleague,    Thank you for referring your patient, Juan Treadwell, to the [facility]. Please see a copy of my visit note below.    CHIEF CONCERN:  Left shoulder pain    HISTORY OF PRESENT ILLNESS:  Mr. Treadwell is a 57 year old LHD man who is referred for L shoulder pain. He notes that he had an injury in Oct 2020 and went to PT at [facility] after the injury. He got better and it was manageable but it has been worse in recent months. He saw Dr. Faye in Nov 2022 and an MRI was obtained. He was referred for those findings. He notes his primary problem is pain at night. He says today that he feels his shoulder is not so bad he would pursue a surgery (he would like to avoid surgery).     Past Medical History:   Diagnosis Date     Anal fistula      Impingement syndrome of left shoulder 11/10/2020     Kidney stones 2016 2016 One episode     Low back pain 08/23/2017    Herniated disk     Uncomplicated asthma        Past Surgical History:   Procedure Laterality Date     COLONOSCOPY N/A 12/16/2014    Procedure: COLONOSCOPY;  Surgeon: Raz Ortiz MD;  Location: [facility] OR     EXAM UNDER ANESTHESIA, FISTULOTOMY RECTUM, COMBINED N/A 12/16/2014    Procedure: COMBINED EXAM UNDER ANESTHESIA, FISTULOTOMY RECTUM;  Surgeon: Raz Ortiz MD;  Location: [facility] OR     [facility]         Current Outpatient Medications   Medication Sig Dispense Refill     albuterol (PROAIR HFA/PROVENTIL HFA/VENTOLIN HFA) 108 (90 Base) MCG/ACT inhaler Inhale 2 puffs into the lungs every 4 hours as needed for shortness of breath / dyspnea or wheezing 1 Inhaler 2     atorvastatin (LIPITOR) 80 MG tablet TAKE ONE TABLET BY MOUTH ONCE DAILY 90 tablet 0     Cholecalciferol (VITAMIN D) 2000 UNITS tablet Take 2,000 Units by mouth       COMPOUNDED NON-CONTROLLED SUBSTANCE (CMPD RX) - PHARMACY TO MIX  COMPOUNDED MEDICATION Apply 20 mLs into each nare 2 times daily Gentamicin/Dexamethasone 160/120mg/liter saline 2000 mL 6     famotidine (PEPCID) 40 MG tablet Take 1 tablet (40 mg) by mouth daily 90 tablet 3     insulin pen needle (31G X 5 MM) 31G X 5 MM miscellaneous Use 1 pen needles daily or as directed. 100 each 1     montelukast (SINGULAIR) 10 MG tablet TAKE ONE TABLET BY MOUTH AT BEDTIME 90 tablet 0     Multiple Vitamins-Minerals (MULTIVITAMIN OR)        order for DME Equipment being ordered: 1 cc syringe, with 23 gauge 1 inch needles  Use twice monthly 10 Units 5     order for DME Resmed Airsense 10 auto cpap 10-14 cm, Mirage Fx nasal mask std       rOPINIRole (REQUIP) 2 MG tablet TAKE ONE TABLET BY MOUTH AT BEDTIME 90 tablet 0     SAXENDA 18 MG/3ML pen WEEK 1: INJECT 0.6MG SUB-Q DAILY. WEEK 2: 1.2MG DAILY. WEEK 3: 1.8MG DAILY. WEEK 4: 2.4MG DAILY. WEEK 5 & ON INJECT 3MG DAILY. 6 mL 0     tadalafil (CIALIS) 5 MG tablet TAKE ONE TABLET BY MOUTH ONCE DAILY 90 tablet 0          Allergies   Allergen Reactions     Codeine Nausea and Vomiting       SOCIAL HISTORY:    Social History     Socioeconomic History     Marital status:      Spouse name: Not on file     Number of children: Not on file     Years of education: Not on file     Highest education level: Not on file   Occupational History     Occupation:  IT   Tobacco Use     Smoking status: Never     Smokeless tobacco: Never     Tobacco comments:     no 2nd hand smoke exposure   Substance and Sexual Activity     Alcohol use: Yes     Alcohol/week: 3.0 - 4.0 standard drinks     Comment: 3-4 drinks weekly     Drug use: Never     Sexual activity: Yes     Partners: Female     Birth control/protection: I.U.D.   Other Topics Concern     Parent/sibling w/ CABG, MI or angioplasty before 65F 55M? No   Social History Narrative     Not on file     Social Determinants of Health     Financial Resource Strain: Not on file   Food Insecurity: Not on file    Transportation Needs: Not on file   Physical Activity: Not on file   Stress: Not on file   Social Connections: Not on file   Intimate Partner Violence: Not on file   Housing Stability: Not on file       FAMILY HISTORY: Reviewed in EMR      REVIEW OF SYSTEMS: Positive for that noted in past medical history and history of present illness and otherwise reviewed in EMR     PHYSICAL EXAM:    Adult male in no acute distress. Articulates and communicates with normal affect.  Respirations even and unlabored  Focused upper extremity exam: Skin intact. No erythema. Sensation intact all dermatomes into the hand to light touch. EPL, FPL, and Intrinsics intact. Right shoulder active motion is FE to 175, ER at side to 75, and IR to L5. Left shoulder active motion is FE to 170, ER to 70, and IR to L2.  On the L he has pain with Neer and De La Torre. No pain on palpation over the AC joint. Mod pain on palpation over the long head of the biceps. Pain with OBriens. Some discomfort with Speeds. Strength in FE and ER is 5-/5.    IMAGING:  Left shoulder XR demonstrates no arthrosis or fracture    Left shoulder MRI dated 11/29/22 was reviewed and I agree with the Impression below:  Impression:  1. Rotator cuff:  *  Complete tear of the supraspinatus with medial retraction to the  level of the center of the humeral head.   *  Moderate grade intrasubstance tearing of the mid infraspinatus at  the footprint.   *  Subscapularis tendinosis with low to moderate grade intrasubstance  tearing of the upper fibers at the footprint.  *  Mild fatty infiltration of the supraspinatus muscle belly.  2. Thinning of cartilage of the superior humeral head.  3. Irregular area of increased fluid signal within the superior medial  humeral head suspicious for a small area of avascular necrosis.    ASSESSMENT:    1. Left full thickness rotator cuff tear with retraction and atrophy  2. Left humeral chondral loss/thinning    PLAN:  I reviewed the imaging and exam  "findings with the patient. We discussed the specifics of a potential rotator cuff repair with likely allograft augmentation. We discussed surgical options for an irreparable cuff tear. The patient had the opportunity for questions to be answered. He states again that he feels his shoulder is \"not so bad\" and he would like to avoid surgery. He prefers instead to focus on a home exercise program. He will follow up as needed.     Nicole Weeks MD        Again, thank you for allowing me to participate in the care of your patient.        Sincerely,        Nicole Weeks MD    "

## 2023-01-07 NOTE — PROGRESS NOTES
STEPHANI is a 57 year old who is being evaluated via a billable video visit.      If the video visit is dropped, the invitation should be resent by: Text to cell phone: [phone]  Will anyone else be joining your video visit? No      Video-Visit Details    Type of service:  Video Visit    Originating Location (pt. Location): Home    Distant Location (provider location):  [facility]     Platform used for Video Visit: [employer]        1/10/2023      Return Medical Weight Management Note     Stephani Treadwell  MRN:  [id number]  :  1965    Dear Kelli Porter MD,    I had the pleasure of seeing your patient Stephani Treadwell. He is a 57 year old male who I am continuing to see for treatment of obesity related to:       2022   I have the following health issues associated with obesity: High Cholesterol, Sleep Apnea, GERD (Reflux)   I have the following symptoms associated with obesity: Knee Pain, Back Pain, Hip Pain       Assessment & Plan   Problem List Items Addressed This Visit     DIAMOND (obstructive sleep apnea)- moderate (AHI 17) (Chronic)     May improve with wt loss         Class 1 obesity due to excess calories with serious comorbidity and body mass index (BMI) of 30.0 to 30.9 in adult - Primary     Patient was congratulated on wt loss success thus far. Healthy habits to assist with further weight loss were discussed. STEPHANI will continue the Saxenda. He plans on returning to the level of exercise he was doing before the holidays and has joined  for accountability.                Relevant Medications    liraglutide - Weight Management (SAXENDA) 18 MG/3ML pen   Other Visit Diagnoses     Class 1 obesity due to excess calories with serious comorbidity and body mass index (BMI) of 31.0 to 31.9 in adult        Relevant Medications    liraglutide - Weight Management (SAXENDA) 18 MG/3ML pen           PATIENT INSTRUCTIONS:  Continue he Saxenda 2.4 mg daily.  If you  notice portions are not controlled or you are having more hunger you can always increase before your next appointment.    Goals:  Get back to you level of exercise you we doing before the holidays.   Continue to following [employer] and use this of accountability.     FOLLOW-UP:    Please call [phone] to schedule your next visit in 3 months.     31 minutes spent on the date of the encounter doing chart review, history and exam, result review, counseling, developing plan of care, documentation, and further activities as noted      INTERVAL HISTORY:  STEPHANI returns for medical weight management follow up He sees Micha Arroyo PA-C usually. Last seen in 10/5/2022 where he stopped Phentermine and started Saxenda. The medication went well at first lost about 5 lbs weight at first but then gained about 3-4 lbs back and now is at a plateau.  Also decreased his exercise significantly from Thanksgiving on. Is taking 2.4 mg daily.  Was on 3 mg but over holidays had some nausea so dropped back.     Did not seem to have much hunger control when on Phentermine.  Has not tried topiramate with Phentermine. Has a probably hx of a kidney stone.  Saxenda costs him 900 dollars a month until deductible is met.  Has $3000/year.       WEIGHT METRICS:  Body mass index is 30.72 kg/m .   Current Weight: 208 lb (94.3 kg) (pt reportd)  Last Visits Weight: 210 lb (95.3 kg)  Initial Weight (lbs): 227 lbs  Cumulative weight loss (lbs): 19  Weight Loss Percentage: 8.37%    Wt Readings from Last 10 Encounters:   01/10/23 208 lb (94.3 kg)   01/02/23 218 lb (98.9 kg)   12/22/22 210 lb (95.3 kg)   10/05/22 210 lb (95.3 kg)   08/09/22 220 lb (99.8 kg)   08/03/22 220 lb (99.8 kg)   07/14/22 230 lb 3.2 oz (104.4 kg)   06/23/22 227 lb (103 kg)   06/23/22 227 lb (103 kg)   12/15/21 224 lb 11.2 oz (101.9 kg)        Weight Loss Medication History Reviewed With Patient 1/9/2023   Which weight loss medications are you currently taking on a regular basis?  Saxenda  (liraglutide)   Are you having any side effects from the weight loss medication that we have prescribed you? No       Changes and Difficulties 1/9/2023   I have made the following changes to my diet since my last visit: Reduced portions eatimg more chicken and fish   With regards to my diet, I am still struggling with: Late night snacking   I have made the following changes to my activity/exercise since my last visit: Getting a little less than i should but am starting a new routine this week to get more regular excercise   With regards to my activity/exercise, I am still struggling with: Finding time.     MEDICATIONS:   Current Outpatient Medications   Medication Sig Dispense Refill     albuterol (PROAIR HFA/PROVENTIL HFA/VENTOLIN HFA) 108 (90 Base) MCG/ACT inhaler Inhale 2 puffs into the lungs every 4 hours as needed for shortness of breath / dyspnea or wheezing 1 Inhaler 2     atorvastatin (LIPITOR) 80 MG tablet TAKE ONE TABLET BY MOUTH ONCE DAILY 90 tablet 0     Cholecalciferol (VITAMIN D) 2000 UNITS tablet Take 2,000 Units by mouth       COMPOUNDED NON-CONTROLLED SUBSTANCE (CMPD RX) - PHARMACY TO MIX COMPOUNDED MEDICATION Apply 20 mLs into each nare 2 times daily Gentamicin/Dexamethasone 160/120mg/liter saline 2000 mL 6     famotidine (PEPCID) 40 MG tablet Take 1 tablet (40 mg) by mouth daily 90 tablet 3     insulin pen needle (31G X 5 MM) 31G X 5 MM miscellaneous Use 1 pen needles daily or as directed. 100 each 1     liraglutide - Weight Management (SAXENDA) 18 MG/3ML pen Inject 2.4 mg Subcutaneous daily 45 mL 0     montelukast (SINGULAIR) 10 MG tablet TAKE ONE TABLET BY MOUTH AT BEDTIME 90 tablet 0     Multiple Vitamins-Minerals (MULTIVITAMIN OR)        order for DME Equipment being ordered: 1 cc syringe, with 23 gauge 1 inch needles  Use twice monthly 10 Units 5     order for DME Resmed Airsense 10 auto cpap 10-14 cm, Mirage Fx nasal mask std       rOPINIRole (REQUIP) 2 MG tablet TAKE ONE TABLET BY MOUTH AT  BEDTIME 90 tablet 0     tadalafil (CIALIS) 5 MG tablet TAKE ONE TABLET BY MOUTH ONCE DAILY 90 tablet 0       LABS:  Hemoglobin A1C   Date Value Ref Range Status   06/29/2022 5.6 0.0 - 5.6 % Final     Comment:     Normal <5.7%   Prediabetes 5.7-6.4%    Diabetes 6.5% or higher     Note: Adopted from ADA consensus guidelines.     TSH   Date Value Ref Range Status   12/11/2019 2.20 0.40 - 4.00 mU/L Final     Sodium   Date Value Ref Range Status   10/12/2022 135 133 - 144 mmol/L Final   12/11/2020 138 133 - 144 mmol/L Final     Potassium   Date Value Ref Range Status   10/12/2022 4.4 3.4 - 5.3 mmol/L Final   12/11/2020 4.3 3.4 - 5.3 mmol/L Final     Chloride   Date Value Ref Range Status   10/12/2022 103 94 - 109 mmol/L Final   12/11/2020 105 94 - 109 mmol/L Final     Carbon Dioxide   Date Value Ref Range Status   12/11/2020 25 20 - 32 mmol/L Final     Carbon Dioxide (CO2)   Date Value Ref Range Status   10/12/2022 27 20 - 32 mmol/L Final     Anion Gap   Date Value Ref Range Status   10/12/2022 5 3 - 14 mmol/L Final   12/11/2020 8 3 - 14 mmol/L Final     Glucose   Date Value Ref Range Status   10/12/2022 105 (H) 70 - 99 mg/dL Final   12/11/2020 112 (H) 70 - 99 mg/dL Final     Comment:     Fasting specimen     Urea Nitrogen   Date Value Ref Range Status   10/12/2022 11 7 - 30 mg/dL Final   12/11/2020 16 7 - 30 mg/dL Final     Creatinine   Date Value Ref Range Status   10/12/2022 1.04 0.66 - 1.25 mg/dL Final   12/11/2020 1.02 0.66 - 1.25 mg/dL Final     GFR Estimate   Date Value Ref Range Status   10/12/2022 84 >60 mL/min/1.73m2 Final     Comment:     Effective December 21, 2021 eGFRcr in adults is calculated using the 2021 CKD-EPI creatinine equation which includes age and gender (Rupa armenta al., NEJM, DOI: 10.1056/BPUVjh5659857)   12/11/2020 83 >60 mL/min/[1.73_m2] Final     Comment:     Non  GFR Calc  Starting 12/18/2018, serum creatinine based estimated GFR (eGFR) will be   calculated using the Chronic  Kidney Disease Epidemiology Collaboration   (CKD-EPI) equation.       Calcium   Date Value Ref Range Status   10/12/2022 10.1 8.5 - 10.1 mg/dL Final   12/11/2020 9.7 8.5 - 10.1 mg/dL Final     Bilirubin Total   Date Value Ref Range Status   10/12/2022 0.5 0.2 - 1.3 mg/dL Final   12/11/2020 0.4 0.2 - 1.3 mg/dL Final     Alkaline Phosphatase   Date Value Ref Range Status   10/12/2022 123 40 - 150 U/L Final   12/11/2020 117 40 - 150 U/L Final     ALT   Date Value Ref Range Status   10/12/2022 35 0 - 70 U/L Final   12/11/2020 38 0 - 70 U/L Final     AST   Date Value Ref Range Status   10/12/2022 18 0 - 45 U/L Final   12/11/2020 17 0 - 45 U/L Final     Cholesterol   Date Value Ref Range Status   12/15/2021 157 <200 mg/dL Final   12/11/2020 185 <200 mg/dL Final     HDL Cholesterol   Date Value Ref Range Status   12/11/2020 50 >39 mg/dL Final     Direct Measure HDL   Date Value Ref Range Status   12/15/2021 57 >=40 mg/dL Final     LDL Cholesterol Calculated   Date Value Ref Range Status   12/15/2021 49 <=100 mg/dL Final   12/11/2020 94 <100 mg/dL Final     Comment:     Desirable:       <100 mg/dl     Triglycerides   Date Value Ref Range Status   12/15/2021 253 (H) <150 mg/dL Final   12/11/2020 205 (H) <150 mg/dL Final     Comment:     Borderline high:  150-199 mg/dl  High:             200-499 mg/dl  Very high:       >499 mg/dl  Fasting specimen       WBC   Date Value Ref Range Status   12/11/2019 7.9 4.0 - 11.0 10e9/L Final     Hemoglobin   Date Value Ref Range Status   12/11/2019 14.6 13.3 - 17.7 g/dL Final     Hematocrit   Date Value Ref Range Status   12/11/2019 43.3 40.0 - 53.0 % Final     MCV   Date Value Ref Range Status   12/11/2019 93 78 - 100 fl Final     Platelet Count   Date Value Ref Range Status   12/11/2019 338 150 - 450 10e9/L Final         BP Readings from Last 6 Encounters:   08/12/22 112/80   07/14/22 (!) 145/85   07/07/22 130/81   12/15/21 122/76   10/28/21 129/76   08/20/21 117/79       Pulse Readings  "from Last 6 Encounters:   07/14/22 73   07/07/22 67   12/15/21 77   10/28/21 63   08/20/21 80   07/01/21 80       PE:  Ht 5' 9\" (1.753 m)   Wt 208 lb (94.3 kg)   BMI 30.72 kg/m    GENERAL: Healthy, alert and no distress  EYES: Eyes grossly normal to inspection.  No discharge or erythema, or obvious scleral/conjunctival abnormalities.  RESP: No audible wheeze, cough, or visible cyanosis.  No visible retractions or increased work of breathing.    SKIN: Visible skin clear. No significant rash, abnormal pigmentation or lesions.  NEURO: Cranial nerves grossly intact.  Mentation and speech appropriate for age.  PSYCH: Mentation appears normal, affect normal/bright, judgement and insight intact, normal speech and appearance well-groomed.      Sincerely,      Hien Echevarria PA-C        "

## 2023-01-10 ENCOUNTER — MYC MEDICAL ADVICE (OUTPATIENT)
Dept: SLEEP MEDICINE | Facility: CLINIC | Age: 58
End: 2023-01-10

## 2023-01-10 ENCOUNTER — VIRTUAL VISIT (OUTPATIENT)
Dept: SURGERY | Facility: CLINIC | Age: 58
End: 2023-01-10
Payer: COMMERCIAL

## 2023-01-10 VITALS — BODY MASS INDEX: 30.81 KG/M2 | WEIGHT: 208 LBS | HEIGHT: 69 IN

## 2023-01-10 DIAGNOSIS — G25.81 RESTLESS LEGS SYNDROME (RLS): ICD-10-CM

## 2023-01-10 DIAGNOSIS — G47.33 OSA (OBSTRUCTIVE SLEEP APNEA): Chronic | ICD-10-CM

## 2023-01-10 DIAGNOSIS — E66.811 CLASS 1 OBESITY DUE TO EXCESS CALORIES WITH SERIOUS COMORBIDITY AND BODY MASS INDEX (BMI) OF 31.0 TO 31.9 IN ADULT: ICD-10-CM

## 2023-01-10 DIAGNOSIS — E66.811 CLASS 1 OBESITY DUE TO EXCESS CALORIES WITH SERIOUS COMORBIDITY AND BODY MASS INDEX (BMI) OF 30.0 TO 30.9 IN ADULT: Primary | ICD-10-CM

## 2023-01-10 DIAGNOSIS — E66.09 CLASS 1 OBESITY DUE TO EXCESS CALORIES WITH SERIOUS COMORBIDITY AND BODY MASS INDEX (BMI) OF 31.0 TO 31.9 IN ADULT: ICD-10-CM

## 2023-01-10 DIAGNOSIS — E66.09 CLASS 1 OBESITY DUE TO EXCESS CALORIES WITH SERIOUS COMORBIDITY AND BODY MASS INDEX (BMI) OF 30.0 TO 30.9 IN ADULT: Primary | ICD-10-CM

## 2023-01-10 PROCEDURE — 99214 OFFICE O/P EST MOD 30 MIN: CPT | Mod: 95 | Performed by: PHYSICIAN ASSISTANT

## 2023-01-10 RX ORDER — LIRAGLUTIDE 6 MG/ML
2.4 INJECTION, SOLUTION SUBCUTANEOUS DAILY
Qty: 45 ML | Refills: 0 | Status: SHIPPED | OUTPATIENT
Start: 2023-01-10 | End: 2023-08-15

## 2023-01-10 NOTE — ASSESSMENT & PLAN NOTE
Patient was congratulated on wt loss success thus far. Healthy habits to assist with further weight loss were discussed. STEPHANI will continue the Saxenda. He plans on returning to the level of exercise he was doing before the holidays and has joined [employer] for accountability.

## 2023-01-20 DIAGNOSIS — J45.40 MODERATE PERSISTENT ASTHMA WITHOUT COMPLICATION: Chronic | ICD-10-CM

## 2023-01-20 DIAGNOSIS — N52.9 ERECTILE DYSFUNCTION, UNSPECIFIED ERECTILE DYSFUNCTION TYPE: Chronic | ICD-10-CM

## 2023-01-20 DIAGNOSIS — E78.5 HYPERLIPIDEMIA LDL GOAL <130: Chronic | ICD-10-CM

## 2023-01-20 DIAGNOSIS — G25.81 RESTLESS LEGS SYNDROME (RLS): ICD-10-CM

## 2023-01-20 RX ORDER — TADALAFIL 5 MG/1
TABLET ORAL
Qty: 90 TABLET | Refills: 0 | Status: SHIPPED | OUTPATIENT
Start: 2023-01-20 | End: 2023-04-04

## 2023-01-20 RX ORDER — ATORVASTATIN CALCIUM 80 MG/1
TABLET, FILM COATED ORAL
Qty: 90 TABLET | Refills: 0 | Status: SHIPPED | OUTPATIENT
Start: 2023-01-20 | End: 2023-04-04

## 2023-01-20 RX ORDER — ROPINIROLE 2 MG/1
TABLET, FILM COATED ORAL
Qty: 90 TABLET | Refills: 0 | Status: SHIPPED | OUTPATIENT
Start: 2023-01-20 | End: 2023-04-04

## 2023-01-20 RX ORDER — MONTELUKAST SODIUM 10 MG/1
TABLET ORAL
Qty: 90 TABLET | Refills: 0 | Status: SHIPPED | OUTPATIENT
Start: 2023-01-20 | End: 2023-04-04

## 2023-02-06 ENCOUNTER — TELEPHONE (OUTPATIENT)
Dept: FAMILY MEDICINE | Facility: CLINIC | Age: 58
End: 2023-02-06

## 2023-02-06 ENCOUNTER — VIRTUAL VISIT (OUTPATIENT)
Dept: FAMILY MEDICINE | Facility: CLINIC | Age: 58
End: 2023-02-06
Payer: COMMERCIAL

## 2023-02-06 DIAGNOSIS — U07.1 INFECTION DUE TO 2019 NOVEL CORONAVIRUS: Primary | ICD-10-CM

## 2023-02-06 PROCEDURE — 99214 OFFICE O/P EST MOD 30 MIN: CPT | Mod: CS | Performed by: INTERNAL MEDICINE

## 2023-02-06 ASSESSMENT — ASTHMA QUESTIONNAIRES
QUESTION_4 LAST FOUR WEEKS HOW OFTEN HAVE YOU USED YOUR RESCUE INHALER OR NEBULIZER MEDICATION (SUCH AS ALBUTEROL): NOT AT ALL
ACT_TOTALSCORE: 25
QUESTION_1 LAST FOUR WEEKS HOW MUCH OF THE TIME DID YOUR ASTHMA KEEP YOU FROM GETTING AS MUCH DONE AT WORK, SCHOOL OR AT HOME: NONE OF THE TIME
QUESTION_5 LAST FOUR WEEKS HOW WOULD YOU RATE YOUR ASTHMA CONTROL: COMPLETELY CONTROLLED
ACT_TOTALSCORE: 25
QUESTION_3 LAST FOUR WEEKS HOW OFTEN DID YOUR ASTHMA SYMPTOMS (WHEEZING, COUGHING, SHORTNESS OF BREATH, CHEST TIGHTNESS OR PAIN) WAKE YOU UP AT NIGHT OR EARLIER THAN USUAL IN THE MORNING: NOT AT ALL
QUESTION_2 LAST FOUR WEEKS HOW OFTEN HAVE YOU HAD SHORTNESS OF BREATH: NOT AT ALL

## 2023-02-06 NOTE — TELEPHONE ENCOUNTER
RN called patient after completing chart review. RN advised on appointment with provider to discuss treatment options for antivirals as well as to discuss other medications either prescribed or OTC to treat other symptoms. Patient verbalized understanding and agreed to plan. Appointment scheduled for 02/06/23.    Mirta Ta RN    [facility]

## 2023-02-06 NOTE — PROGRESS NOTES
Answers for HPI/ROS submitted by the patient on 2/6/2023  How many servings of fruits and vegetables do you eat daily?: 2-3  On average, how many sweetened beverages do you drink each day (Examples: soda, juice, sweet tea, etc.  Do NOT count diet or artificially sweetened beverages)?: 0  How many minutes a day do you exercise enough to make your heart beat faster?: 30 to 60  How many days a week do you exercise enough to make your heart beat faster?: 5  How many days per week do you miss taking your medication?: 0  What is the reason for your visit today?: Positive covid trst  When did your symptoms begin?: 1-3 days ago  What are your symptoms?: Fever, diarrhea, sore throat, sinus pain and congestion, coughand headaches.  How would you describe these symptoms?: Moderate  Are your symptoms:: Staying the same  Have you had these symptoms before?: No  Is there anything that makes you feel worse?: No  Is there anything that makes you feel better?: No    STEPHANI is a 57 year old who is being evaluated via a billable video visit.      How would you like to obtain your AVS? MyChart  If the video visit is dropped, the invitation should be resent by: Text to cell phone: [phone]  Will anyone else be joining your video visit? No          Assessment & Plan     Infection due to 2019 novel coronavirus  Has been having symptoms of COVID since Saturday  Having diarrhea/headache/fever/sore throat  No increasing shortness of breath'  Interested in exploring the treatment options with Paxlovid  Discussed about the side effects of the same  Discussed about dizziness/nausea/diarrhea which can potentially get worse as he is already having diarrhea  Discussed about bitter taste in the mouth  Discussed about the rare skin reaction that can happen  Discussed about quarantine measures  Discussed about drug interactions and rebound COVID  He is on Lipitor which is to be held for 5 days when he is taking this medication  Also he cannot take  "tadalafil when he is taking this  - nirmatrelvir and ritonavir (PAXLOVID) therapy pack; Take 3 tablets by mouth 2 times daily for 5 days (Take 2 Nirmatrelvir tablets and 1 Ritonavir tablet twice daily for 5 days)      25 minutes spent on the date of the encounter doing chart review, history and exam, documentation and further activities per the note       BMI:   Estimated body mass index is 30.72 kg/m  as calculated from the following:    Height as of 1/10/23: 1.753 m (5' 9\").    Weight as of 1/10/23: 94.3 kg (208 lb).           Return in about 4 weeks (around 3/6/2023), or if symptoms worsen or fail to improve.    Jorge Luis Leiva MD  [employer] [employer] DEONTE STYLES is a 57 year old, presenting for the following health issues:  No chief complaint on file.      History of Present Illness       Reason for visit:  Positive covid trst  Symptom onset:  1-3 days ago  Symptoms include:  Fever, diarrhea, sore throat, sinus pain and congestion, coughand headaches.  Symptom intensity:  Moderate  Symptom progression:  Staying the same  Had these symptoms before:  No  What makes it worse:  No  What makes it better:  No    He eats 2-3 servings of fruits and vegetables daily.He consumes 0 sweetened beverage(s) daily.He exercises with enough effort to increase his heart rate 30 to 60 minutes per day.  He exercises with enough effort to increase his heart rate 5 days per week.   He is taking medications regularly.             Review of Systems   Constitutional, HEENT, cardiovascular, pulmonary, gi and gu systems are negative, except as otherwise noted.      Objective           Vitals:  No vitals were obtained today due to virtual visit.    Physical Exam   GENERAL: Healthy, alert and no distress  EYES: Eyes grossly normal to inspection.  No discharge or erythema, or obvious scleral/conjunctival abnormalities.  RESP: No audible wheeze, cough, or visible cyanosis.  No visible retractions or increased work of " breathing.    SKIN: Visible skin clear. No significant rash, abnormal pigmentation or lesions.  NEURO: Cranial nerves grossly intact.  Mentation and speech appropriate for age.  PSYCH: Mentation appears normal, affect normal/bright, judgement and insight intact, normal speech and appearance well-groomed.                Video-Visit Details    Type of service:  Video Visit     Originating Location (pt. Location): Home    Distant Location (provider location):  Off-site  Platform used for Video Visit: Ayde

## 2023-02-06 NOTE — TELEPHONE ENCOUNTER
COVID Positive/Requesting COVID treatment  Patient is positive for COVID and requesting treatment options.      Date of positive COVID test (PCR or at home) 2/6/23     Current COVID symptoms: cold, fever, cough, chest congestion    Date COVID symptoms began: Saturday 2/4     Message should be routed to clinic RN pool. Best phone number to use for call back: [phone]

## 2023-02-18 ENCOUNTER — HEALTH MAINTENANCE LETTER (OUTPATIENT)
Age: 58
End: 2023-02-18

## 2023-02-23 ENCOUNTER — OFFICE VISIT (OUTPATIENT)
Dept: OTOLARYNGOLOGY | Facility: CLINIC | Age: 58
End: 2023-02-23
Payer: COMMERCIAL

## 2023-02-23 VITALS
DIASTOLIC BLOOD PRESSURE: 80 MMHG | RESPIRATION RATE: 16 BRPM | WEIGHT: 218 LBS | SYSTOLIC BLOOD PRESSURE: 125 MMHG | BODY MASS INDEX: 32.19 KG/M2 | HEART RATE: 72 BPM | OXYGEN SATURATION: 96 %

## 2023-02-23 DIAGNOSIS — J34.89 NASAL SEPTAL PERFORATION: ICD-10-CM

## 2023-02-23 DIAGNOSIS — J31.0 PURULENT RHINITIS: Primary | ICD-10-CM

## 2023-02-23 DIAGNOSIS — J31.0 CHRONIC RHINITIS: ICD-10-CM

## 2023-02-23 PROCEDURE — 99214 OFFICE O/P EST MOD 30 MIN: CPT | Performed by: OTOLARYNGOLOGY

## 2023-02-23 ASSESSMENT — PAIN SCALES - GENERAL: PAINLEVEL: MODERATE PAIN (4)

## 2023-02-23 NOTE — PROGRESS NOTES
History of Present Illness - Juan Treadwell is a 57 year old male here to see me in follow up from 10/28/2021, with an intiial visit on 7/1/21.    To review,  for a long time he has had a chronic ulceration and crust in the nose.  It can be painful and he always has to be cleaning out these crusts.  There is also post nasal drainage from this as well.  It has been off an don for years, but has become very unbearable for the last 2 months.  It is causing a lot of issues with his CPAP as well.  He has used CPAP for over five years.    On exam, I found a previously undiagnosed large septal perforation and florid purulent rhinitis with pus covered crusts bilaterally.  I cultured, and used the results to place him on an antibiotic nasal irrigation.  He is here for follow up.  Of note, it was not possible to understand if the purulent rhinitis and recurrent digital manipulation have caused the perforation, or if the perforation was there and caused the dryness and subsequent infeciton.    On exam on 8/20/21, there had been a dramatic improvement.  About 90% better, but the perforation was unchanged.  Therefore I asked him to continue the medicated irrigations, to see if control of the purulence would allow some spontaneous improvement of the perforation.  He tells me that his symptoms are all dramatically better.  No congestion or fullness and he was lost to follow up until now.    He has returned due to the slow return of all his previous symptoms of congestion, nasal discomfort, and bloody crusts.  He tells me that things were good for a while, but over the last 6 months, especially with the onset of winter things all came back.    Past medical history -   Patient Active Problem List   Diagnosis     DIAMOND (obstructive sleep apnea)- moderate (AHI 17)     Hyperlipidemia LDL goal <130     Allergic rhinitis, unspecified allergic rhinitis type     Male hypogonadism     Erectile dysfunction, unspecified erectile dysfunction  type     Gastroesophageal reflux disease without esophagitis     Restless legs syndrome (RLS)     Moderate persistent asthma without complication     Nasal septal perforation     Class 1 obesity due to excess calories with serious comorbidity and body mass index (BMI) of 30.0 to 30.9 in adult     Impingement syndrome of left shoulder       /80   Pulse 72   Resp 16   Wt 98.9 kg (218 lb)   SpO2 96%   BMI 32.19 kg/m      General - The patient is well nourished and well developed, and appears to have good nutritional status.  Alert and oriented to person and place, answers questions and cooperates with examination appropriately.   Head and Face - Normocephalic and atraumatic, with no gross asymmetry noted of the contour of the facial features.  The facial nerve is intact, with strong symmetric movements.  Eyes - Extraocular movements intact, and the pupils were reactive to light.  Sclera were not icteric or injected, conjunctiva were pink and moist.  Mouth - Examination of the oral cavity shows pink, healthy, moist mucosa.  No lesions or ulceration noted.  The dentition are in good repair.  The tongue is mobile and midline.  Nose - The nasal cavity is back to where it was before, covered with bloody and purulent crusts, the perforation is the same size, about 1.5 cm    A/P - Juan Treadwell  (J31.0) Purulent rhinitis  (primary encounter diagnosis)  (J34.89) Nasal septal perforation  (J31.0) Chronic rhinitis  (J34.89) Nasal obstruction    The same issues of rhinitis sicca and purulent crusting has returned.  We discussed again the difficulty in this situation, as with a perforation of his size, there will always be abnormal air flow and drying, leading to this situation.    I will try the same compounded nasal irrigations we used with success last time. But this time I have intructed him to stay with it twice daily for 2-3 months, then follow up electronically.     If it fails, we should consider a septal  button placement in clinic.  Or, we could bring him back in and get cultures to see if we need to fine tune medical management

## 2023-02-23 NOTE — LETTER
2/23/2023         RE: Juan Treadwell  [id number]  [facility] [address]        Dear Colleague,    Thank you for referring your patient, Juan Treadwell, to the [facility]. Please see a copy of my visit note below.    History of Present Illness - Juan Treadwell is a 57 year old male here to see me in follow up from 10/28/2021, with an intiial visit on 7/1/21.    To review,  for a long time he has had a chronic ulceration and crust in the nose.  It can be painful and he always has to be cleaning out these crusts.  There is also post nasal drainage from this as well.  It has been off an don for years, but has become very unbearable for the last 2 months.  It is causing a lot of issues with his CPAP as well.  He has used CPAP for over five years.    On exam, I found a previously undiagnosed large septal perforation and florid purulent rhinitis with pus covered crusts bilaterally.  I cultured, and used the results to place him on an antibiotic nasal irrigation.  He is here for follow up.  Of note, it was not possible to understand if the purulent rhinitis and recurrent digital manipulation have caused the perforation, or if the perforation was there and caused the dryness and subsequent infeciton.    On exam on 8/20/21, there had been a dramatic improvement.  About 90% better, but the perforation was unchanged.  Therefore I asked him to continue the medicated irrigations, to see if control of the purulence would allow some spontaneous improvement of the perforation.  He tells me that his symptoms are all dramatically better.  No congestion or fullness and he was lost to follow up until now.    He has returned due to the slow return of all his previous symptoms of congestion, nasal discomfort, and bloody crusts.  He tells me that things were good for a while, but over the last 6 months, especially with the onset of winter things all came back.    Past medical history -   Patient Active Problem  List   Diagnosis     DIAMOND (obstructive sleep apnea)- moderate (AHI 17)     Hyperlipidemia LDL goal <130     Allergic rhinitis, unspecified allergic rhinitis type     Male hypogonadism     Erectile dysfunction, unspecified erectile dysfunction type     Gastroesophageal reflux disease without esophagitis     Restless legs syndrome (RLS)     Moderate persistent asthma without complication     Nasal septal perforation     Class 1 obesity due to excess calories with serious comorbidity and body mass index (BMI) of 30.0 to 30.9 in adult     Impingement syndrome of left shoulder       /80   Pulse 72   Resp 16   Wt 98.9 kg (218 lb)   SpO2 96%   BMI 32.19 kg/m      General - The patient is well nourished and well developed, and appears to have good nutritional status.  Alert and oriented to person and place, answers questions and cooperates with examination appropriately.   Head and Face - Normocephalic and atraumatic, with no gross asymmetry noted of the contour of the facial features.  The facial nerve is intact, with strong symmetric movements.  Eyes - Extraocular movements intact, and the pupils were reactive to light.  Sclera were not icteric or injected, conjunctiva were pink and moist.  Mouth - Examination of the oral cavity shows pink, healthy, moist mucosa.  No lesions or ulceration noted.  The dentition are in good repair.  The tongue is mobile and midline.  Nose - The nasal cavity is back to where it was before, covered with bloody and purulent crusts, the perforation is the same size, about 1.5 cm    A/P - Juan Treadwell  (J31.0) Purulent rhinitis  (primary encounter diagnosis)  (J34.89) Nasal septal perforation  (J31.0) Chronic rhinitis  (J34.89) Nasal obstruction    The same issues of rhinitis sicca and purulent crusting has returned.  We discussed again the difficulty in this situation, as with a perforation of his size, there will always be abnormal air flow and drying, leading to this  situation.    I will try the same compounded nasal irrigations we used with success last time. But this time I have intructed him to stay with it twice daily for 2-3 months, then follow up electronically.     If it fails, we should consider a septal button placement in clinic.  Or, we could bring him back in and get cultures to see if we need to fine tune medical management        Again, thank you for allowing me to participate in the care of your patient.        Sincerely,        Mendez Palmer MD

## 2023-02-24 ENCOUNTER — TELEPHONE (OUTPATIENT)
Dept: OTOLARYNGOLOGY | Facility: CLINIC | Age: 58
End: 2023-02-24
Payer: COMMERCIAL

## 2023-02-24 DIAGNOSIS — J34.89 NASAL SEPTAL PERFORATION: ICD-10-CM

## 2023-02-24 DIAGNOSIS — J31.0 CHRONIC RHINITIS: ICD-10-CM

## 2023-02-24 DIAGNOSIS — J31.0 PURULENT RHINITIS: ICD-10-CM

## 2023-02-24 NOTE — TELEPHONE ENCOUNTER
Hello,    We received a prescription for gentamicin/dexamethasone 160/120mg/liter saline with no directions. Please send a new prescription or call to clarify at [phone].    Thank you,    Siobhan Evans CPhT, ALAINA    [facility]  [address] [place] [id number]   [email or web address]  [email or web address]   Phone: [phone]  Fax: [phone]

## 2023-03-22 NOTE — PROGRESS NOTES
"JUAN is a 57 year old who is being evaluated via a billable video visit.      The patient has been notified of following:     \"This video visit will be conducted via a call between you and your physician/provider. We have found that certain health care needs can be provided without the need for an in-person physical exam.  This service lets us provide the care you need with a video conversation.  If a prescription is necessary we can send it directly to your pharmacy.  If lab work is needed we can place an order for that and you can then stop by our lab to have the test done at a later time.    Video visits are billed at different rates depending on your insurance coverage.  Please reach out to your insurance provider with any questions.    If during the course of the call the physician/provider feels a video visit is not appropriate, you will not be charged for this service.\"    Patient has given verbal consent for Video visit? Yes    How would you like to obtain your AVS? MyChart    If the video visit is dropped, the invitation should be resent by: Text to cell phone: [phone]    Will anyone else be joining your video visit? No    I    Video-Visit Details    Type of service:  Video Visit    Video Start Time: 12:01    Video End Time: 12:13    Originating Location (pt. Location): Home    Distant Location (provider location):  [facility] [place]     Platform used for Video Visit: [facility] Medical Weight Management Note         Juan Treadwell  MRN:  [id number]  :  1965  MAURICIO:  3/23/2023    Dear Kelli Porter MD,    I had the pleasure of seeing your patient Juan Treadwell. He is a 57 year old male who I am continuing to see for treatment of obesity related to:       2022   I have the following health issues associated with obesity: High Cholesterol, Sleep Apnea, GERD (Reflux)   I have the following symptoms associated with obesity: Knee Pain, " Back Pain, Hip Pain       Assessment & Plan   Problem List Items Addressed This Visit     Overweight with body mass index (BMI) of 29 to 29.9 in adult - Primary     Patient will continue with Saxenda 2.4 mg daily         Relevant Orders    Basic metabolic panel        Patient will get labs drawn and will request refill when ready.       PATIENT INSTRUCTIONS:  Get lab drawn        FOLLOW-UP:  Early August 20 minutes spent on the date of the encounter doing chart review, history and exam, result review, counseling, developing plan of care, documentation, and further activities as noted       INTERVAL HISTORY:  Patient last seen by Hien Echevarria PA-C 1/2023. At that time was injecting Saxenda 2.4 mg every morning. Feels help with appetite. Does not have late night snack cravings like before. Was at Saxenda 3.0 mg but went down and feels like this is a good dose. Reports no side effects  Patient has increased exercise since last visit. Is walking most days. Has lost 10 lbs in the past 2 months.       Recent diet changes:  48 oz water daily, 42 oz diet coke, couple days a week will have a drink  B: hashbrowns and a turkey sausage marianne  L: chicken marianne from [employer]  D: Sixes with Caesar Salad  Snacks: infrequent      Recent exercise/activity changes:  Using treadmill, bike and elliptical for about an hour 5 days per week    Recent stressors: low      CURRENT WEIGHT:   198 lbs 0 oz    Initial Weight (lbs): 227 lbs  Last Visits Weight: 218 lb (98.9 kg)  Cumulative weight loss (lbs): 29  Weight Loss Percentage: 12.78%    Changes and Difficulties 3/20/2023   I have made the following changes to my diet since my last visit: Eating much better and making better choices   With regards to my diet, I am still struggling with: Water intake   I have made the following changes to my activity/exercise since my last visit: Excercising 4-5 days a week walking 4-8 miles on treadmill and a little resistance training.   With regards  to my activity/exercise, I am still struggling with: Nothing at this time         MEDICATIONS:   Current Outpatient Medications   Medication Sig Dispense Refill     albuterol (PROAIR HFA/PROVENTIL HFA/VENTOLIN HFA) 108 (90 Base) MCG/ACT inhaler Inhale 2 puffs into the lungs every 4 hours as needed for shortness of breath / dyspnea or wheezing 1 Inhaler 2     atorvastatin (LIPITOR) 80 MG tablet TAKE ONE TABLET BY MOUTH ONCE DAILY 90 tablet 0     Cholecalciferol (VITAMIN D) 2000 UNITS tablet Take 2,000 Units by mouth       COMPOUNDED NON-CONTROLLED SUBSTANCE (CMPD RX) - PHARMACY TO MIX COMPOUNDED MEDICATION Apply 20 mLs into each nare 2 times daily Gentamicin/Dexamethasone 160/120mg/liter saline 2000 mL 6     COMPOUNDED NON-CONTROLLED SUBSTANCE (CMPD RX) - PHARMACY TO MIX COMPOUNDED MEDICATION Gentamicin/Dexamethasone, 160/120mg/liter saline 2000 mL 11     famotidine (PEPCID) 40 MG tablet Take 1 tablet (40 mg) by mouth daily 90 tablet 3     insulin pen needle (31G X 5 MM) 31G X 5 MM miscellaneous Use 1 pen needles daily or as directed. 100 each 1     liraglutide - Weight Management (SAXENDA) 18 MG/3ML pen Inject 2.4 mg Subcutaneous daily 45 mL 0     montelukast (SINGULAIR) 10 MG tablet TAKE ONE TABLET BY MOUTH AT BEDTIME 90 tablet 0     Multiple Vitamins-Minerals (MULTIVITAMIN OR)        order for DME Equipment being ordered: 1 cc syringe, with 23 gauge 1 inch needles  Use twice monthly 10 Units 5     order for DME Resmed Airsense 10 auto cpap 10-14 cm, Mirage Fx nasal mask std       rOPINIRole (REQUIP) 2 MG tablet TAKE ONE TABLET BY MOUTH AT BEDTIME 90 tablet 0     tadalafil (CIALIS) 5 MG tablet TAKE ONE TABLET BY MOUTH ONCE DAILY 90 tablet 0       Weight Loss Medication History Reviewed With Patient 3/20/2023   Which weight loss medications are you currently taking on a regular basis? Saxenda (liraglutide)   Are you having any side effects from the weight loss medication that we have prescribed you? No  "      BRODERICK 10/4/2022  Gastrointestinal  Heartburn: not had much of an issue  Abdominal pain: No  History of pancreatitis: No  Severe constipation: No  Hx of bowel obstruction: No        PHYSICAL EXAM:  Objective    Ht 5' 9\" (1.753 m)   Wt 198 lb (89.8 kg)   BMI 29.24 kg/m      GENERAL: Healthy, alert and no distress  EYES: Eyes grossly normal to inspection.  No discharge or erythema, or obvious scleral/conjunctival abnormalities.  RESP: No audible wheeze, cough, or visible cyanosis.  No visible retractions or increased work of breathing.    SKIN: Visible skin clear. No significant rash, abnormal pigmentation or lesions.  NEURO: Cranial nerves grossly intact.  Mentation and speech appropriate for age.  PSYCH: Mentation appears normal, affect normal/bright, judgement and insight intact, normal speech and appearance well-groomed.        Sincerely,    Micha Arroyo PA-C    "

## 2023-03-23 ENCOUNTER — VIRTUAL VISIT (OUTPATIENT)
Dept: SURGERY | Facility: CLINIC | Age: 58
End: 2023-03-23
Payer: COMMERCIAL

## 2023-03-23 VITALS — WEIGHT: 198 LBS | HEIGHT: 69 IN | BODY MASS INDEX: 29.33 KG/M2

## 2023-03-23 DIAGNOSIS — E66.3 OVERWEIGHT WITH BODY MASS INDEX (BMI) OF 29 TO 29.9 IN ADULT: Primary | ICD-10-CM

## 2023-03-23 PROCEDURE — 99213 OFFICE O/P EST LOW 20 MIN: CPT | Mod: VID | Performed by: PHYSICIAN ASSISTANT

## 2023-03-23 NOTE — PATIENT INSTRUCTIONS
"Nice to talk with you today. Thank you for allowing me the privilege of caring for you. We hope we provided you with the excellent service you deserve.     To ensure the quality of our services you may receive a patient satisfaction survey from an independent monitoring company.  The greatest compliment you can give is \"Likely to Recommend\"    Below is our plan we discussed.-  MUNDO Muse        Please call [phone] and schedule a follow up with Micha Arroyo PA-C in early August.  If you need to reach me sooner you can do so by calling [phone].    Have a great day!       Eat Better ? Move More ? Live Well    Eat 3 nutrient-rich meals each day    Don t skip meals--it will cause you to overeat later in the day!    Eating fiber (vegetables/fruits/whole grains) and protein with meals helps you stay full longer    Choose foods with less than 10 grams of sugar and 5 grams of fat per serving to prevent excess calories and weight re-gain  Eat around the same times each day to develop a routine eating schedule   Avoid snacking unless physically hungry.   Planned snacks: 1-2 times per day and no more than 150 calories    Eat protein first   Protein helps with healing, maintaining adequate muscle mass, reducing hunger and optimizing nutritional status   Aim for 60-80 grams of protein per day   Fill up on Fiber   Fiber comes from plants--fruits, veggies, whole grains, nuts/seeds and beans   Fiber is low in calories, high in phytonutrients and helps you stay full longer   Aim for 25-35 grams per day by eating fiber with meals and snacks  Eat [id number]   Take 20-30 minutes to eat each meal by taking small bites, chewing foods to applesauce consistency or 20-30 times before you swallow   Eating foods too fast can delay satiety/fullness signals and increase overeating   Slow down your eating by using toddler utensils, putting your fork/spoon down between bites and not watching TV or emailing during meals!   Keep a " Journal         Writing down what you eat, how you feel and when you are active helps you identify new changes to work on from week to week         Look for ways to cut 100 calories from your current diet 2-3 times per day  Drink 64 ounces of 0-Calorie drinks between meals   Water   Zero calorie Propel  or Vitamin Water     SoBe Lifewater  Zero Calories   Crystal Light , Sugar-Free Joselito-Aid , and other sugar-free lemonade or flavored coleman   Keep Caffeine to less than 300mg per day ie: 3-6oz cups coffee     Work up to 45-60 minutes of physical activity most days of the week   Helps with losing weight and prevent regaining those extra pounds!    Do a combo of cardio (walking/water exercises) and strength training (lifting weights/Vinyasa yoga)    Avoid Mindless Eating   Be present when you eat--take note of the smell, taste and quality of your food   Make a list of alternative activities you could do to prevent eating out of boredom/stress  Go for a walk, call a friend, chew gum, paint your nails, re-organize the garage, etc

## 2023-04-04 DIAGNOSIS — N52.9 ERECTILE DYSFUNCTION, UNSPECIFIED ERECTILE DYSFUNCTION TYPE: Chronic | ICD-10-CM

## 2023-04-04 DIAGNOSIS — E78.5 HYPERLIPIDEMIA LDL GOAL <130: Chronic | ICD-10-CM

## 2023-04-04 DIAGNOSIS — J45.40 MODERATE PERSISTENT ASTHMA WITHOUT COMPLICATION: Chronic | ICD-10-CM

## 2023-04-04 DIAGNOSIS — G25.81 RESTLESS LEGS SYNDROME (RLS): ICD-10-CM

## 2023-04-04 RX ORDER — ATORVASTATIN CALCIUM 80 MG/1
TABLET, FILM COATED ORAL
Qty: 90 TABLET | Refills: 0 | Status: SHIPPED | OUTPATIENT
Start: 2023-04-04 | End: 2023-04-07

## 2023-04-04 RX ORDER — ROPINIROLE 2 MG/1
TABLET, FILM COATED ORAL
Qty: 90 TABLET | Refills: 0 | Status: SHIPPED | OUTPATIENT
Start: 2023-04-04 | End: 2023-04-07

## 2023-04-04 RX ORDER — MONTELUKAST SODIUM 10 MG/1
TABLET ORAL
Qty: 90 TABLET | Refills: 0 | Status: SHIPPED | OUTPATIENT
Start: 2023-04-04 | End: 2023-04-07

## 2023-04-04 RX ORDER — TADALAFIL 5 MG/1
TABLET ORAL
Qty: 90 TABLET | Refills: 0 | Status: SHIPPED | OUTPATIENT
Start: 2023-04-04 | End: 2023-06-27

## 2023-04-07 ENCOUNTER — OFFICE VISIT (OUTPATIENT)
Dept: FAMILY MEDICINE | Facility: CLINIC | Age: 58
End: 2023-04-07
Payer: COMMERCIAL

## 2023-04-07 VITALS
SYSTOLIC BLOOD PRESSURE: 119 MMHG | BODY MASS INDEX: 30.9 KG/M2 | RESPIRATION RATE: 24 BRPM | TEMPERATURE: 98.5 F | OXYGEN SATURATION: 91 % | DIASTOLIC BLOOD PRESSURE: 74 MMHG | HEIGHT: 69 IN | HEART RATE: 85 BPM | WEIGHT: 208.6 LBS

## 2023-04-07 DIAGNOSIS — Z12.5 PROSTATE CANCER SCREENING: ICD-10-CM

## 2023-04-07 DIAGNOSIS — Z00.00 ROUTINE GENERAL MEDICAL EXAMINATION AT A HEALTH CARE FACILITY: Primary | ICD-10-CM

## 2023-04-07 DIAGNOSIS — E78.5 HYPERLIPIDEMIA LDL GOAL <130: Chronic | ICD-10-CM

## 2023-04-07 DIAGNOSIS — G25.81 RESTLESS LEGS SYNDROME (RLS): ICD-10-CM

## 2023-04-07 DIAGNOSIS — J45.40 MODERATE PERSISTENT ASTHMA WITHOUT COMPLICATION: Chronic | ICD-10-CM

## 2023-04-07 LAB
ALT SERPL W P-5'-P-CCNC: 42 U/L (ref 0–70)
AST SERPL W P-5'-P-CCNC: 23 U/L (ref 0–45)
CHOLEST SERPL-MCNC: 121 MG/DL
FASTING STATUS PATIENT QL REPORTED: NO
HDLC SERPL-MCNC: 64 MG/DL
LDLC SERPL CALC-MCNC: 39 MG/DL
NONHDLC SERPL-MCNC: 57 MG/DL
PSA SERPL-MCNC: 2.77 UG/L (ref 0–4)
TRIGL SERPL-MCNC: 90 MG/DL

## 2023-04-07 PROCEDURE — 84460 ALANINE AMINO (ALT) (SGPT): CPT | Performed by: FAMILY MEDICINE

## 2023-04-07 PROCEDURE — 80061 LIPID PANEL: CPT | Performed by: FAMILY MEDICINE

## 2023-04-07 PROCEDURE — 99396 PREV VISIT EST AGE 40-64: CPT | Performed by: FAMILY MEDICINE

## 2023-04-07 PROCEDURE — 36415 COLL VENOUS BLD VENIPUNCTURE: CPT | Performed by: FAMILY MEDICINE

## 2023-04-07 PROCEDURE — G0103 PSA SCREENING: HCPCS | Performed by: FAMILY MEDICINE

## 2023-04-07 PROCEDURE — 84450 TRANSFERASE (AST) (SGOT): CPT | Performed by: FAMILY MEDICINE

## 2023-04-07 RX ORDER — ROPINIROLE 2 MG/1
4 TABLET, FILM COATED ORAL AT BEDTIME
Qty: 180 TABLET | Refills: 3 | Status: SHIPPED | OUTPATIENT
Start: 2023-04-07 | End: 2024-06-12

## 2023-04-07 RX ORDER — ROPINIROLE 2 MG/1
2 TABLET, FILM COATED ORAL AT BEDTIME
Qty: 90 TABLET | Refills: 2 | Status: SHIPPED | OUTPATIENT
Start: 2023-04-07 | End: 2023-04-07

## 2023-04-07 RX ORDER — ATORVASTATIN CALCIUM 80 MG/1
80 TABLET, FILM COATED ORAL DAILY
Qty: 90 TABLET | Refills: 2 | Status: SHIPPED | OUTPATIENT
Start: 2023-04-07 | End: 2024-03-05

## 2023-04-07 RX ORDER — MONTELUKAST SODIUM 10 MG/1
TABLET ORAL
Qty: 90 TABLET | Refills: 2 | Status: SHIPPED | OUTPATIENT
Start: 2023-04-07 | End: 2024-03-05

## 2023-04-07 ASSESSMENT — ASTHMA QUESTIONNAIRES
QUESTION_5 LAST FOUR WEEKS HOW WOULD YOU RATE YOUR ASTHMA CONTROL: COMPLETELY CONTROLLED
QUESTION_3 LAST FOUR WEEKS HOW OFTEN DID YOUR ASTHMA SYMPTOMS (WHEEZING, COUGHING, SHORTNESS OF BREATH, CHEST TIGHTNESS OR PAIN) WAKE YOU UP AT NIGHT OR EARLIER THAN USUAL IN THE MORNING: NOT AT ALL
ACT_TOTALSCORE: 25
ACT_TOTALSCORE: 25
QUESTION_4 LAST FOUR WEEKS HOW OFTEN HAVE YOU USED YOUR RESCUE INHALER OR NEBULIZER MEDICATION (SUCH AS ALBUTEROL): NOT AT ALL
QUESTION_2 LAST FOUR WEEKS HOW OFTEN HAVE YOU HAD SHORTNESS OF BREATH: NOT AT ALL
QUESTION_1 LAST FOUR WEEKS HOW MUCH OF THE TIME DID YOUR ASTHMA KEEP YOU FROM GETTING AS MUCH DONE AT WORK, SCHOOL OR AT HOME: NONE OF THE TIME

## 2023-04-07 ASSESSMENT — ENCOUNTER SYMPTOMS
MYALGIAS: 1
PALPITATIONS: 0
FEVER: 0
DIARRHEA: 0
CONSTIPATION: 0
CHILLS: 0
ARTHRALGIAS: 1
HEARTBURN: 0
WEAKNESS: 0
DIZZINESS: 0
SORE THROAT: 0
NERVOUS/ANXIOUS: 0
NAUSEA: 0
HEMATURIA: 0
HEMATOCHEZIA: 0
DYSURIA: 0
EYE PAIN: 0
PARESTHESIAS: 0
FREQUENCY: 1
HEADACHES: 0
SHORTNESS OF BREATH: 0
ABDOMINAL PAIN: 0
COUGH: 0
JOINT SWELLING: 0

## 2023-04-07 ASSESSMENT — PAIN SCALES - GENERAL: PAINLEVEL: NO PAIN (0)

## 2023-04-07 NOTE — PROGRESS NOTES
SUBJECTIVE:   CC: STEPHANI is an 57 year old who presents for preventative health visit.       4/7/2023     4:06 PM   Additional Questions   Roomed by Jessica SORENSON   Accompanied by self     Patient has been advised of split billing requirements and indicates understanding: Yes  Healthy Habits:     Getting at least 3 servings of Calcium per day:  Yes    Bi-annual eye exam:  NO    Dental care twice a year:  Yes    Sleep apnea or symptoms of sleep apnea:  Sleep apnea    Diet:  Regular (no restrictions)    Frequency of exercise:  4-5 days/week    Duration of exercise:  45-60 minutes    Taking medications regularly:  Yes    Medication side effects:  None, No muscle aches and No significant flushing    PHQ-2 Total Score: 0    Additional concerns today:  No              Today's PHQ-2 Score:       4/7/2023     8:28 AM   PHQ-2 ( 1999 [employer])   Q1: Little interest or pleasure in doing things 0   Q2: Feeling down, depressed or hopeless 0   PHQ-2 Score 0   Q1: Little interest or pleasure in doing things Not at all   Q2: Feeling down, depressed or hopeless Not at all   PHQ-2 Score 0           Social History     Tobacco Use     Smoking status: Never     Smokeless tobacco: Never     Tobacco comments:     no 2nd hand smoke exposure   Vaping Use     Vaping status: Never Used   Substance Use Topics     Alcohol use: Yes     Alcohol/week: 3.0 - 4.0 standard drinks of alcohol     Comment: 3-4 drinks weekly             4/7/2023     8:27 AM   Alcohol Use   Prescreen: >3 drinks/day or >7 drinks/week? No          View : No data to display.                Last PSA:   PSA   Date Value Ref Range Status   12/11/2020 2.49 0 - 4 ug/L Final     Comment:     Assay Method:  Chemiluminescence using Siemens Vista analyzer     Prostate Specific Antigen Screen   Date Value Ref Range Status   12/15/2021 2.64 0.00 - 4.00 ug/L Final       Reviewed orders with patient. Reviewed health maintenance and updated orders accordingly - Yes  Lab work is in process  Labs  "reviewed in EPIC  BP Readings from Last 3 Encounters:   04/07/23 119/74   02/23/23 125/80   08/12/22 112/80    Wt Readings from Last 3 Encounters:   04/07/23 94.6 kg (208 lb 9.6 oz)   03/23/23 89.8 kg (198 lb)   02/23/23 98.9 kg (218 lb)                    Reviewed and updated as needed this visit by clinical staff   Tobacco  Allergies  Meds              Reviewed and updated as needed this visit by Provider                 Here today for routine checkup and follow-up on lipids.  We reviewed interval history together and in his chart.      Review of Systems   Constitutional: Negative for chills and fever.   HENT: Negative for congestion, ear pain, hearing loss and sore throat.    Eyes: Negative for pain and visual disturbance.   Respiratory: Negative for cough and shortness of breath.    Cardiovascular: Negative for chest pain, palpitations and peripheral edema.   Gastrointestinal: Negative for abdominal pain, constipation, diarrhea, heartburn, hematochezia and nausea.   Genitourinary: Positive for frequency, impotence and urgency. Negative for dysuria, genital sores, hematuria and penile discharge.   Musculoskeletal: Positive for arthralgias and myalgias. Negative for joint swelling.   Skin: Negative for rash.   Neurological: Negative for dizziness, weakness, headaches and paresthesias.   Psychiatric/Behavioral: Negative for mood changes. The patient is not nervous/anxious.        OBJECTIVE:   /74 (BP Location: Right arm, Patient Position: Sitting, Cuff Size: Adult Regular)   Pulse 85   Temp 98.5  F (36.9  C) (Oral)   Resp 24   Ht 1.746 m (5' 8.75\")   Wt 94.6 kg (208 lb 9.6 oz)   SpO2 91%   BMI 31.03 kg/m      Physical Exam  GENERAL: healthy, alert and no distress  EYES: Eyes grossly normal to inspection, PERRL and conjunctivae and sclerae normal  HENT: ear canals and TM's normal, nose and mouth without ulcers or lesions  NECK: no adenopathy, no asymmetry, masses, or scars and thyroid normal to " "palpation  RESP: lungs clear to auscultation - no rales, rhonchi or wheezes  CV: regular rate and rhythm, normal S1 S2, no S3 or S4, no murmur, click or rub, no peripheral edema and peripheral pulses strong  ABDOMEN: soft, nontender, no hepatosplenomegaly, no masses and bowel sounds normal  MS: no gross musculoskeletal defects noted, no edema  SKIN: no suspicious lesions or rashes  NEURO: Normal strength and tone, mentation intact and speech normal  PSYCH: mentation appears normal, affect normal/bright    Diagnostic Test Results:  Labs reviewed in Epic    ASSESSMENT/PLAN:   (Z00.00) Routine general medical examination at a health care facility  (primary encounter diagnosis)  Comment: Routine health maintenance otherwise up-to-date  Plan:     (E78.5) Hyperlipidemia LDL goal <130  Comment: Recheck and adjust as needed  Plan: atorvastatin (LIPITOR) 80 MG tablet,         Comprehensive metabolic panel (BMP + Alb, Alk         Phos, ALT, AST, Total. Bili, TP), Lipid panel         reflex to direct LDL Non-fasting            (J45.40) Moderate persistent asthma without complication  Comment: Stable on current regimen.  Continue same plan and routine follow-up.   Plan: montelukast (SINGULAIR) 10 MG tablet            (G25.81) Restless legs syndrome (RLS)  Comment: Rechecking on ferritin level  Plan: rOPINIRole (REQUIP) 2 MG tablet            (Z12.5) Prostate cancer screening  Comment:   Plan: Prostate Specific Antigen Screen              Patient has been advised of split billing requirements and indicates understanding: Yes      COUNSELING:   Reviewed preventive health counseling, as reflected in patient instructions       Regular exercise       Healthy diet/nutrition       Vision screening       Colorectal cancer screening       Prostate cancer screening      BMI:   Estimated body mass index is 31.03 kg/m  as calculated from the following:    Height as of this encounter: 1.746 m (5' 8.75\").    Weight as of this encounter: 94.6 " kg (208 lb 9.6 oz).   Weight management plan: Discussed healthy diet and exercise guidelines      He reports that he has never smoked. He has never used smokeless tobacco.            Kelli Porter MD  [facility]   difficulty starting stream

## 2023-04-13 ENCOUNTER — MYC MEDICAL ADVICE (OUTPATIENT)
Dept: FAMILY MEDICINE | Facility: CLINIC | Age: 58
End: 2023-04-13
Payer: COMMERCIAL

## 2023-04-13 NOTE — TELEPHONE ENCOUNTER
RN notes the following below. Patient did not complete other labs ordered by other providers.        Rosalie Hatch RN  [facility]

## 2023-04-13 NOTE — TELEPHONE ENCOUNTER
Routing Noknoker message to provider to review and advise.     GÉNESIS Brink  [facility]

## 2023-04-14 NOTE — TELEPHONE ENCOUNTER
Joann Strickland,  So this is a patient that is well-known to me and I saw him for physical the other day.  I ordered lab work but he had lab work that was supposed to be run ordered by outside physicians.  I even checked that box on our lab card.  But they never ran those tests and now it is too late to add them onto the samples.  So unfortunately we need to poke him again and I let him know that.  But I want to make sure that we can do this without charging him another lab visit because it was our screwed up and not his.  How do we go about doing that?

## 2023-05-02 ENCOUNTER — LAB (OUTPATIENT)
Dept: LAB | Facility: CLINIC | Age: 58
End: 2023-05-02
Payer: COMMERCIAL

## 2023-05-02 DIAGNOSIS — E66.3 OVERWEIGHT WITH BODY MASS INDEX (BMI) OF 29 TO 29.9 IN ADULT: ICD-10-CM

## 2023-05-02 DIAGNOSIS — G25.81 RESTLESS LEGS SYNDROME (RLS): ICD-10-CM

## 2023-05-02 LAB
ANION GAP SERPL CALCULATED.3IONS-SCNC: 12 MMOL/L (ref 7–15)
BUN SERPL-MCNC: 12.3 MG/DL (ref 6–20)
CALCIUM SERPL-MCNC: 9.7 MG/DL (ref 8.6–10)
CHLORIDE SERPL-SCNC: 102 MMOL/L (ref 98–107)
CREAT SERPL-MCNC: 1.28 MG/DL (ref 0.67–1.17)
DEPRECATED HCO3 PLAS-SCNC: 25 MMOL/L (ref 22–29)
FERRITIN SERPL-MCNC: 92 NG/ML (ref 31–409)
GFR SERPL CREATININE-BSD FRML MDRD: 66 ML/MIN/1.73M2
GLUCOSE SERPL-MCNC: 97 MG/DL (ref 70–99)
POTASSIUM SERPL-SCNC: 4.3 MMOL/L (ref 3.4–5.3)
SODIUM SERPL-SCNC: 139 MMOL/L (ref 136–145)

## 2023-05-02 PROCEDURE — 80048 BASIC METABOLIC PNL TOTAL CA: CPT

## 2023-05-02 PROCEDURE — 82728 ASSAY OF FERRITIN: CPT

## 2023-05-02 PROCEDURE — 36415 COLL VENOUS BLD VENIPUNCTURE: CPT

## 2023-05-07 DIAGNOSIS — E66.811 CLASS 1 OBESITY DUE TO EXCESS CALORIES WITH SERIOUS COMORBIDITY AND BODY MASS INDEX (BMI) OF 31.0 TO 31.9 IN ADULT: Primary | ICD-10-CM

## 2023-05-07 DIAGNOSIS — E66.09 CLASS 1 OBESITY DUE TO EXCESS CALORIES WITH SERIOUS COMORBIDITY AND BODY MASS INDEX (BMI) OF 31.0 TO 31.9 IN ADULT: Primary | ICD-10-CM

## 2023-06-27 DIAGNOSIS — N52.9 ERECTILE DYSFUNCTION, UNSPECIFIED ERECTILE DYSFUNCTION TYPE: Chronic | ICD-10-CM

## 2023-06-27 RX ORDER — TADALAFIL 5 MG/1
TABLET ORAL
Qty: 90 TABLET | Refills: 0 | Status: SHIPPED | OUTPATIENT
Start: 2023-06-27 | End: 2023-09-18

## 2023-07-19 ENCOUNTER — APPOINTMENT (OUTPATIENT)
Dept: URBAN - METROPOLITAN AREA CLINIC 257 | Age: 58
Setting detail: DERMATOLOGY
End: 2023-07-20

## 2023-07-19 DIAGNOSIS — L57.8 OTHER SKIN CHANGES DUE TO CHRONIC EXPOSURE TO NONIONIZING RADIATION: ICD-10-CM

## 2023-07-19 DIAGNOSIS — Z71.89 OTHER SPECIFIED COUNSELING: ICD-10-CM

## 2023-07-19 DIAGNOSIS — L82.1 OTHER SEBORRHEIC KERATOSIS: ICD-10-CM

## 2023-07-19 DIAGNOSIS — L81.4 OTHER MELANIN HYPERPIGMENTATION: ICD-10-CM

## 2023-07-19 DIAGNOSIS — D22 MELANOCYTIC NEVI: ICD-10-CM

## 2023-07-19 DIAGNOSIS — L30.4 ERYTHEMA INTERTRIGO: ICD-10-CM

## 2023-07-19 DIAGNOSIS — D18.0 HEMANGIOMA: ICD-10-CM

## 2023-07-19 PROBLEM — D18.01 HEMANGIOMA OF SKIN AND SUBCUTANEOUS TISSUE: Status: ACTIVE | Noted: 2023-07-19

## 2023-07-19 PROBLEM — D22.5 MELANOCYTIC NEVI OF TRUNK: Status: ACTIVE | Noted: 2023-07-19

## 2023-07-19 PROCEDURE — OTHER COUNSELING: OTHER

## 2023-07-19 PROCEDURE — OTHER MIPS QUALITY: OTHER

## 2023-07-19 PROCEDURE — 99203 OFFICE O/P NEW LOW 30 MIN: CPT

## 2023-07-19 PROCEDURE — OTHER PRESCRIPTION: OTHER

## 2023-07-19 RX ORDER — HYDROCORTISONE 25 MG/G
CREAM TOPICAL
Qty: 2 | Refills: 4 | Status: ERX | COMMUNITY
Start: 2023-07-19

## 2023-07-19 ASSESSMENT — LOCATION ZONE DERM
LOCATION ZONE: AXILLAE
LOCATION ZONE: TRUNK

## 2023-07-19 ASSESSMENT — LOCATION DETAILED DESCRIPTION DERM
LOCATION DETAILED: LEFT AXILLARY VAULT
LOCATION DETAILED: LEFT MEDIAL UPPER BACK
LOCATION DETAILED: RIGHT AXILLARY VAULT
LOCATION DETAILED: RIGHT SUPERIOR MEDIAL LOWER BACK
LOCATION DETAILED: LEFT SUPERIOR MEDIAL UPPER BACK

## 2023-07-19 ASSESSMENT — LOCATION SIMPLE DESCRIPTION DERM
LOCATION SIMPLE: LEFT UPPER BACK
LOCATION SIMPLE: LEFT AXILLARY VAULT
LOCATION SIMPLE: RIGHT LOWER BACK
LOCATION SIMPLE: RIGHT AXILLARY VAULT

## 2023-08-01 ENCOUNTER — TRANSFERRED RECORDS (OUTPATIENT)
Dept: HEALTH INFORMATION MANAGEMENT | Facility: CLINIC | Age: 58
End: 2023-08-01
Payer: COMMERCIAL

## 2023-08-14 ENCOUNTER — LAB (OUTPATIENT)
Dept: LAB | Facility: CLINIC | Age: 58
End: 2023-08-14
Payer: COMMERCIAL

## 2023-08-14 DIAGNOSIS — E66.811 CLASS 1 OBESITY DUE TO EXCESS CALORIES WITH SERIOUS COMORBIDITY AND BODY MASS INDEX (BMI) OF 31.0 TO 31.9 IN ADULT: ICD-10-CM

## 2023-08-14 DIAGNOSIS — E66.09 CLASS 1 OBESITY DUE TO EXCESS CALORIES WITH SERIOUS COMORBIDITY AND BODY MASS INDEX (BMI) OF 31.0 TO 31.9 IN ADULT: ICD-10-CM

## 2023-08-14 LAB
ANION GAP SERPL CALCULATED.3IONS-SCNC: 13 MMOL/L (ref 7–15)
BUN SERPL-MCNC: 15.5 MG/DL (ref 6–20)
CALCIUM SERPL-MCNC: 9.9 MG/DL (ref 8.6–10)
CHLORIDE SERPL-SCNC: 103 MMOL/L (ref 98–107)
CREAT SERPL-MCNC: 1.03 MG/DL (ref 0.67–1.17)
DEPRECATED HCO3 PLAS-SCNC: 23 MMOL/L (ref 22–29)
GFR SERPL CREATININE-BSD FRML MDRD: 85 ML/MIN/1.73M2
GLUCOSE SERPL-MCNC: 91 MG/DL (ref 70–99)
POTASSIUM SERPL-SCNC: 4.7 MMOL/L (ref 3.4–5.3)
SODIUM SERPL-SCNC: 139 MMOL/L (ref 136–145)

## 2023-08-14 PROCEDURE — 36415 COLL VENOUS BLD VENIPUNCTURE: CPT

## 2023-08-14 PROCEDURE — 80048 BASIC METABOLIC PNL TOTAL CA: CPT

## 2023-08-15 DIAGNOSIS — E66.811 CLASS 1 OBESITY DUE TO EXCESS CALORIES WITH SERIOUS COMORBIDITY AND BODY MASS INDEX (BMI) OF 31.0 TO 31.9 IN ADULT: ICD-10-CM

## 2023-08-15 DIAGNOSIS — E66.09 CLASS 1 OBESITY DUE TO EXCESS CALORIES WITH SERIOUS COMORBIDITY AND BODY MASS INDEX (BMI) OF 31.0 TO 31.9 IN ADULT: ICD-10-CM

## 2023-08-15 RX ORDER — LIRAGLUTIDE 6 MG/ML
1.8 INJECTION, SOLUTION SUBCUTANEOUS DAILY
Qty: 15 ML | Refills: 1 | Status: SHIPPED | OUTPATIENT
Start: 2023-08-15 | End: 2024-08-13

## 2023-08-15 RX ORDER — PEN NEEDLE, DIABETIC 31 GX3/16"
NEEDLE, DISPOSABLE MISCELLANEOUS
Qty: 100 EACH | Refills: 1 | Status: SHIPPED | OUTPATIENT
Start: 2023-08-15 | End: 2024-08-13

## 2023-08-15 NOTE — TELEPHONE ENCOUNTER
Per pt's message:   Yes I am taking it daily, 1.8ml, I do think it works well for me. I have two pens left. I will call and make an appointment.   Appt made 10/24/23.  Will refill until buddy per protocol.    Simi COLEMAN RN

## 2023-09-05 ENCOUNTER — TRANSFERRED RECORDS (OUTPATIENT)
Dept: HEALTH INFORMATION MANAGEMENT | Facility: CLINIC | Age: 58
End: 2023-09-05
Payer: COMMERCIAL

## 2023-09-17 DIAGNOSIS — N52.9 ERECTILE DYSFUNCTION, UNSPECIFIED ERECTILE DYSFUNCTION TYPE: Chronic | ICD-10-CM

## 2023-09-18 ENCOUNTER — APPOINTMENT (OUTPATIENT)
Dept: URBAN - METROPOLITAN AREA CLINIC 257 | Age: 58
Setting detail: DERMATOLOGY
End: 2023-09-18

## 2023-09-18 DIAGNOSIS — M70.2 OLECRANON BURSITIS: ICD-10-CM

## 2023-09-18 PROBLEM — M70.22 OLECRANON BURSITIS, LEFT ELBOW: Status: ACTIVE | Noted: 2023-09-18

## 2023-09-18 PROCEDURE — OTHER COUNSELING: OTHER

## 2023-09-18 PROCEDURE — OTHER PRESCRIPTION: OTHER

## 2023-09-18 PROCEDURE — OTHER MIPS QUALITY: OTHER

## 2023-09-18 PROCEDURE — OTHER PRESCRIPTION MEDICATION MANAGEMENT: OTHER

## 2023-09-18 PROCEDURE — 99213 OFFICE O/P EST LOW 20 MIN: CPT

## 2023-09-18 RX ORDER — TADALAFIL 5 MG/1
TABLET ORAL
Qty: 90 TABLET | Refills: 0 | Status: SHIPPED | OUTPATIENT
Start: 2023-09-18 | End: 2023-12-11

## 2023-09-18 RX ORDER — CEPHALEXIN 500 MG/1
CAPSULE ORAL
Qty: 28 | Refills: 0 | Status: ERX | COMMUNITY
Start: 2023-09-18

## 2023-09-18 ASSESSMENT — LOCATION ZONE DERM: LOCATION ZONE: ARM

## 2023-09-18 ASSESSMENT — LOCATION DETAILED DESCRIPTION DERM: LOCATION DETAILED: LEFT ELBOW

## 2023-09-18 ASSESSMENT — LOCATION SIMPLE DESCRIPTION DERM: LOCATION SIMPLE: LEFT ELBOW

## 2023-09-18 NOTE — PROCEDURE: PRESCRIPTION MEDICATION MANAGEMENT
Detail Level: Zone
Initiate Treatment: Keflex 500 mg bid for 2 weeks
Render In Strict Bullet Format?: No

## 2023-09-18 NOTE — HPI: SKIN LESION
What Type Of Note Output Would You Prefer (Optional)?: Standard Output
Has Your Skin Lesion Been Treated?: not been treated
Is This A New Presentation, Or A Follow-Up?: Skin Lesion
Additional History: Andrea attempted to dig out the stinger, and now has a scab in that location. He applied Neosporin for a couple of days after the sting occurred, which was not helpful.

## 2023-09-18 NOTE — PROCEDURE: COUNSELING
Patient Specific Counseling (Will Not Stick From Patient To Patient): * Recommended warm compression  \\n* Advised to see Orthopedic surgeon to drain if does not resolve in 2 weeks. Advised to make appt now for 2 weeks out, can cancel if not needed. \\n* Return to clinic in 2 weeks if inflammation has improved but not completely resolved.
Detail Level: Detailed

## 2023-09-24 ENCOUNTER — TRANSFERRED RECORDS (OUTPATIENT)
Dept: HEALTH INFORMATION MANAGEMENT | Facility: CLINIC | Age: 58
End: 2023-09-24
Payer: COMMERCIAL

## 2023-09-25 ENCOUNTER — TRANSFERRED RECORDS (OUTPATIENT)
Dept: HEALTH INFORMATION MANAGEMENT | Facility: CLINIC | Age: 58
End: 2023-09-25
Payer: COMMERCIAL

## 2023-10-10 ENCOUNTER — TELEPHONE (OUTPATIENT)
Dept: OTOLARYNGOLOGY | Facility: CLINIC | Age: 58
End: 2023-10-10
Payer: COMMERCIAL

## 2023-10-10 DIAGNOSIS — J34.89 NASAL SEPTAL PERFORATION: ICD-10-CM

## 2023-10-10 DIAGNOSIS — J31.0 PURULENT RHINITIS: ICD-10-CM

## 2023-10-10 DIAGNOSIS — J31.0 CHRONIC RHINITIS: ICD-10-CM

## 2023-10-10 NOTE — TELEPHONE ENCOUNTER
[facility] Call Center    Phone Message    May a detailed message be left on voicemail: yes     Reason for Call: Other: Please call Pharmacy back to discuss prescription from Dr Palmer.  There were no dosing or frequency instructions.  Please call [phone] and ask to speak with a pharmacist.  Clarke vides, Glynn     Action Taken: Other: ENT    Travel Screening: Not Applicable

## 2023-10-24 ENCOUNTER — OFFICE VISIT (OUTPATIENT)
Dept: SURGERY | Facility: CLINIC | Age: 58
End: 2023-10-24
Payer: COMMERCIAL

## 2023-10-24 VITALS
SYSTOLIC BLOOD PRESSURE: 136 MMHG | RESPIRATION RATE: 16 BRPM | WEIGHT: 225 LBS | OXYGEN SATURATION: 94 % | DIASTOLIC BLOOD PRESSURE: 82 MMHG | BODY MASS INDEX: 34.1 KG/M2 | HEIGHT: 68 IN | HEART RATE: 68 BPM

## 2023-10-24 DIAGNOSIS — E66.811 CLASS 1 OBESITY DUE TO EXCESS CALORIES WITH SERIOUS COMORBIDITY AND BODY MASS INDEX (BMI) OF 34.0 TO 34.9 IN ADULT: Primary | ICD-10-CM

## 2023-10-24 DIAGNOSIS — K21.9 GASTROESOPHAGEAL REFLUX DISEASE WITHOUT ESOPHAGITIS: Chronic | ICD-10-CM

## 2023-10-24 DIAGNOSIS — E66.09 CLASS 1 OBESITY DUE TO EXCESS CALORIES WITH SERIOUS COMORBIDITY AND BODY MASS INDEX (BMI) OF 34.0 TO 34.9 IN ADULT: Primary | ICD-10-CM

## 2023-10-24 DIAGNOSIS — G47.33 OSA (OBSTRUCTIVE SLEEP APNEA): Chronic | ICD-10-CM

## 2023-10-24 PROCEDURE — 99213 OFFICE O/P EST LOW 20 MIN: CPT | Performed by: PHYSICIAN ASSISTANT

## 2023-10-24 RX ORDER — SEMAGLUTIDE 1 MG/.5ML
1 INJECTION, SOLUTION SUBCUTANEOUS
Qty: 2 ML | Refills: 1 | Status: SHIPPED | OUTPATIENT
Start: 2023-10-24 | End: 2023-12-20

## 2023-10-24 NOTE — PATIENT INSTRUCTIONS
"Nice to talk with you today! Thank you for allowing me the privilege of caring for you.   We hope we provided you with the excellent service you deserve.     To ensure the quality of our services you may receive a patient satisfaction survey from an independent monitoring company.  The greatest compliment you can give is \"Likely to Recommend\"      Below is our plan we discussed.-  MUNDO Muse      Will bridge from Saxenda to Wegovy 1.0 mg  Decrease alcohol use and increase water use  Get lab drawn before next visit    Please call [phone] and schedule a follow up with Micha Arroyo PA-C in 3 months.  If you need to reach me sooner you can do so by calling [phone].    Have a great day!     WEGOVY (semaglutide)      Wegovy (semaglutide) injection 2.4 mg is an injectable prescription medicine used for adults with obesity (BMI ?30) or overweight (excess weight) (BMI ?27) who also have weight-related medical problems to help them lose weight and keep the weight off.  It is a GLP-1 agonist medication. GLP1 agonists stimulate the hormone GLP-1 in your body o allow you to feel full quickly and stay full longer.    Due to the shortage, You may need to be persistent and patient to get these initial dosages due to the shortage.  Once you are able to obtain the 0.25 and 0.5 mg and 1 mg dose \"12 weeks of therapy\" you can begin treatment.     Directions:  Start Wegovy (semaglutide) 0.25 mg once weekly for 4 weeks, then if tolerating increase to 0.5 mg weekly for 4 weeks, then if tolerating increase to 1 mg weekly for 4 weeks, then if tolerating increase to 1.7 mg weekly for 4 weeks, then if tolerating increase to 2.4 mg weekly thereafter.      -Each Wegovy pen is a once weekly single-dose prefilled pen with a pen injector already built within the pen. Discard the Wegovy pen after use in sharps container.     Common Side Effects:    nausea, headache, diarrhea, stomach upset.  If these become unmanageable call or " urvashi.    Serious Side effects:   Pancreatitis (inflammation of the pancreas) has been associated with this type of medication, but is very rare.Symptoms of pancreatitis include: Pain in your upper stomach area which may travel to your back and be worse after eating.     Storage:   Store the prescription in the refrigerator. Once it is time to use the Wegovy pen, you can keep the pen at room temperature and it is good for up to 28 days at room temperature.     How to inject:  For a video on how to use the pen please go to:  https://[email or web address]/about-wegovy/how-to-use-the-wegovy-pen.html#itemTwo       For any questions or concerns please send a [employer] message to our team or call our weight management call center at [phone] during regular business hours. For questions during evenings or weekends your messages will be addressed during the next business day.  For emergencies please call 911 or seek immediate medical care.

## 2023-10-24 NOTE — PROGRESS NOTES
Return Medical Weight Management Note         Juan Treadwell  MRN:  [id number]  :  1965  MAURICIO:  10/24/2023        Dear Kelli Porter MD,    I had the pleasure of seeing your patient Juan Treadwell. He is a 58 year old male who I am continuing to see for treatment of obesity related to:        2022     2:11 PM   --   I have the following health issues associated with obesity High Cholesterol    Sleep Apnea    GERD (Reflux)   I have the following symptoms associated with obesity Knee Pain    Back Pain    Hip Pain         Assessment   Problem List Items Addressed This Visit       DIAMOND (obstructive sleep apnea)- moderate (AHI 17) (Chronic)     Stable. Continue use of CPAP          Gastroesophageal reflux disease without esophagitis (Chronic)     Controlled well         Class 1 obesity due to excess calories with serious comorbidity and body mass index (BMI) of 34.0 to 34.9 in adult - Primary     Stop Saxenda  Start Wegovy          Relevant Medications    Semaglutide-Weight Management (WEGOVY) 1 MG/0.5ML pen    Other Relevant Orders    Basic metabolic panel          PLAN/DISCUSSION:  Emphasized the importance of diet and lifestyle for long term success    Will bridge from Saxenda to Wegovy 1.0 mg. Patient information provided  Decrease alcohol use and increase water use  2.  Get lab drawn before next visit  3.  Stop Saxenda and start Wegovy       FOLLOW-UP:  3 months      SUBJECTIVE/OBJECTIVE:  Patient last seen 3/23/2023 and was continue on Saxenda. Is currently on 1.8 mg after taking some time off this summer. Was worried about lab work that came back a bit abnormal. After a couple of months restarted the Saxenda and most recent lab 2023 lab dose was normal. Is aware of the supply issue with this medication    Went to daughters wedding in [place] this summer. Has gone back to old habits.     Anti-obesity medications:   Current: Saxenda 1.8 mg  Side effects: Denies nausea, vomiting, abdominal pain,  severe constipation, diarrhea, or heartburn      Failed/contraindicated:   Phentermine - not real effective  Topiramate - caution due to hx of kidney stones  naltrexone and bupropion - did lose weight initially maybe 10-12 lbs         10/24/2023     8:05 AM   Weight Loss Medication History Reviewed With Patient   Which weight loss medications are you currently taking on a regular basis? Saxenda (liraglutide)   Are you having any side effects from the weight loss medication that we have prescribed you? No       Recent diet changes: 48-64 oz water daily. Most days closer to 60 oz water. No coffee. Will have a 42 oz diet soda - with ice  Drinks 8 beers per week  3 meals most days. Breakfast is hard to get in as not hungry in the AM  Doing more snacking lately    Recent exercise/activity changes: 5-6 days per week will walk 2-3 miles and plays pickleball         CURRENT WEIGHT:   225 lbs 0 oz    Initial Weight (lbs): 227 lbs  Last Visits Weight: 208 lb (94.3 kg)  Cumulative weight loss (lbs): 2  Weight Loss Percentage: 0.88%        10/24/2023     8:05 AM   Changes and Difficulties   I have made the following changes to my diet since my last visit: Have not made many changes   With regards to my diet, I am still struggling with: Late night snacks   I have made the following changes to my activity/exercise since my last visit: Playing pickle ball regularly and walking daily   With regards to my activity/exercise, I am still struggling with: Shoulder, hip and back pain.     Uses CPAP regularly       MEDICATIONS:   Current Outpatient Medications   Medication Sig Dispense Refill    atorvastatin (LIPITOR) 80 MG tablet Take 1 tablet (80 mg) by mouth daily 90 tablet 2    Cholecalciferol (VITAMIN D) 2000 UNITS tablet Take 2,000 Units by mouth      COMPOUNDED NON-CONTROLLED SUBSTANCE (CMPD RX) - PHARMACY TO MIX COMPOUNDED MEDICATION Apply 20 mLs into each nare 2 times daily Gentamicin/Dexamethasone, 160/120mg/liter saline 2000 mL  "11    COMPOUNDED NON-CONTROLLED SUBSTANCE (CMPD RX) - PHARMACY TO MIX COMPOUNDED MEDICATION Apply 20 mLs into each nare 2 times daily Gentamicin/Dexamethasone 160/120mg/liter saline 2000 mL 6    famotidine (PEPCID) 40 MG tablet TAKE ONE TABLET BY MOUTH ONCE DAILY 90 tablet 3    insulin pen needle (UNIFINE PENTIPS PLUS) 31G X 5 MM miscellaneous USE 1 PEN NEEDLE DAILY OR AS DIRECTED 100 each 1    liraglutide - Weight Management (SAXENDA) 18 MG/3ML pen Inject 1.8 mg Subcutaneous daily Inject 1.8 mg subcutaneous daily 15 mL 1    montelukast (SINGULAIR) 10 MG tablet TAKE ONE TABLET BY MOUTH AT BEDTIME 90 tablet 2    Multiple Vitamins-Minerals (MULTIVITAMIN OR)       order for DME Resmed Airsense 10 auto cpap 10-14 cm, Mirage Fx nasal mask std      rOPINIRole (REQUIP) 2 MG tablet Take 2 tablets (4 mg) by mouth At Bedtime 180 tablet 3    Semaglutide-Weight Management (WEGOVY) 1 MG/0.5ML pen Inject 1 mg Subcutaneous every 7 days 2 mL 1    tadalafil (CIALIS) 5 MG tablet TAKE ONE TABLET BY MOUTH ONCE DAILY 90 tablet 0    albuterol (PROAIR HFA/PROVENTIL HFA/VENTOLIN HFA) 108 (90 Base) MCG/ACT inhaler Inhale 2 puffs into the lungs every 4 hours as needed for shortness of breath / dyspnea or wheezing (Patient not taking: Reported on 10/24/2023) 1 Inhaler 2       ROS 10/24/2023  General  Fatigue:better  Sleep Quality: OK - uses CPAP  No dry mouth or insomnia  Gastrointestinal  Heartburn: not had much of an issue - knows triggers takes pepcid prn   Abdominal pain: No  History of pancreatitis: No  Severe constipation: No  Hx of bowel obstruction: No  Gastrourinary  History of kidney stones: possibly a mild stone.  Not sure.  Went to urgent care many years ago  Personal or family history of thyroid cancer: No        PHYSICAL EXAM:  Objective    /82   Pulse 68   Resp 16   Ht 5' 8\" (1.727 m)   Wt 225 lb (102.1 kg)   SpO2 94%   BMI 34.21 kg/m    GENERAL: Healthy, alert and no distress  EYES: Eyes grossly normal to " inspection.  No discharge or erythema, or obvious scleral/conjunctival abnormalities.  RESP: No audible wheeze, cough, or visible cyanosis.  No visible retractions or increased work of breathing.    SKIN: Visible skin clear. No significant rash, abnormal pigmentation or lesions.  NEURO: Cranial nerves grossly intact.  Mentation and speech appropriate for age.  PSYCH: Mentation appears normal, affect normal/bright, judgement and insight intact, normal speech and appearance well-groomed.        Sincerely,    Micha Arroyo PA-C    27 minutes spent on the date of the encounter doing chart review, review of test results, patient visit and documentation

## 2023-10-31 ENCOUNTER — TRANSFERRED RECORDS (OUTPATIENT)
Dept: HEALTH INFORMATION MANAGEMENT | Facility: CLINIC | Age: 58
End: 2023-10-31
Payer: COMMERCIAL

## 2023-11-01 ENCOUNTER — TRANSFERRED RECORDS (OUTPATIENT)
Dept: HEALTH INFORMATION MANAGEMENT | Facility: CLINIC | Age: 58
End: 2023-11-01
Payer: COMMERCIAL

## 2023-11-02 NOTE — PROGRESS NOTES
History of Present Illness - Juan Treadwell is a 58 year old male here to see me in follow up from 2/24/2023, with an intiial visit on 7/1/21.    To review,  for a long time he has had a chronic ulceration and crust in the nose.  It can be painful and he always has to be cleaning out these crusts.  There is also post nasal drainage from this as well.  It has been off an don for years, but has become very unbearable for the last 2 months.  It is causing a lot of issues with his CPAP as well.  He has used CPAP for over five years.    On exam, I found a previously undiagnosed large septal perforation and florid purulent rhinitis with pus covered crusts bilaterally.  I cultured, and used the results to place him on an antibiotic nasal irrigation.  He is here for follow up.  Of note, it was not possible to understand if the purulent rhinitis and recurrent digital manipulation have caused the perforation, or if the perforation was there and caused the dryness and subsequent infeciton.    On exam on 8/20/21, there had been a dramatic improvement.  About 90% better, but the perforation was unchanged.  Therefore I asked him to continue the medicated irrigations, to see if control of the purulence would allow some spontaneous improvement of the perforation.  He tells me that his symptoms are all dramatically better.  No congestion or fullness and he was lost to follow up until now.    He has returned due to the slow return of all his previous symptoms of congestion, nasal discomfort, and bloody crusts.  He tells me that things were good for a while, but over the last 6 months, especially with the onset of winter 2021 things all came back.  I use the same nasal irrigation and he was lost to follow up.    He tells me that it worked again but about mid summer the symptoms started to come back.  It comes and goes, but the main irritation is the throat is very raw and sensitive by the end of summer.      Past medical history -    Patient Active Problem List   Diagnosis    DIAMOND (obstructive sleep apnea)- moderate (AHI 17)    Hyperlipidemia LDL goal <130    Allergic rhinitis, unspecified allergic rhinitis type    Male hypogonadism    Erectile dysfunction, unspecified erectile dysfunction type    Gastroesophageal reflux disease without esophagitis    Restless legs syndrome (RLS)    Moderate persistent asthma without complication    Nasal septal perforation    Class 1 obesity due to excess calories with serious comorbidity and body mass index (BMI) of 34.0 to 34.9 in adult    Impingement syndrome of left shoulder       /87   Pulse 80   Wt 101 kg (222 lb 9.6 oz)   SpO2 96%   BMI 33.85 kg/m      General - The patient is well nourished and well developed, and appears to have good nutritional status.  Alert and oriented to person and place, answers questions and cooperates with examination appropriately.   Head and Face - Normocephalic and atraumatic, with no gross asymmetry noted of the contour of the facial features.  The facial nerve is intact, with strong symmetric movements.  Eyes - Extraocular movements intact, and the pupils were reactive to light.  Sclera were not icteric or injected, conjunctiva were pink and moist.  Mouth - Examination of the oral cavity shows pink, healthy, moist mucosa.  No lesions or ulceration noted.  The dentition are in good repair.  The tongue is mobile and midline.  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  We discussed again the difficulty in this situation, as with a perforation of his size, there will always be abnormal air flow and drying, leading to this  situation.    I have taken cultures and will design a new rinse based on that.    For his irritated throat, I will send in magic mouthwash

## 2023-11-09 ENCOUNTER — OFFICE VISIT (OUTPATIENT)
Dept: OTOLARYNGOLOGY | Facility: CLINIC | Age: 58
End: 2023-11-09
Payer: COMMERCIAL

## 2023-11-09 VITALS
OXYGEN SATURATION: 96 % | BODY MASS INDEX: 33.85 KG/M2 | HEART RATE: 80 BPM | SYSTOLIC BLOOD PRESSURE: 135 MMHG | WEIGHT: 222.6 LBS | DIASTOLIC BLOOD PRESSURE: 87 MMHG

## 2023-11-09 DIAGNOSIS — J31.0 CHRONIC RHINITIS: ICD-10-CM

## 2023-11-09 DIAGNOSIS — J31.0 PURULENT RHINITIS: Primary | ICD-10-CM

## 2023-11-09 DIAGNOSIS — J31.2 CHRONIC PHARYNGITIS: ICD-10-CM

## 2023-11-09 DIAGNOSIS — J34.89 NASAL SEPTAL PERFORATION: ICD-10-CM

## 2023-11-09 PROCEDURE — 87075 CULTR BACTERIA EXCEPT BLOOD: CPT | Performed by: OTOLARYNGOLOGY

## 2023-11-09 PROCEDURE — 2894A FUNGAL OR YEAST CULTURE ROUTINE: CPT | Performed by: OTOLARYNGOLOGY

## 2023-11-09 PROCEDURE — 87102 FUNGUS ISOLATION CULTURE: CPT | Performed by: OTOLARYNGOLOGY

## 2023-11-09 PROCEDURE — 87070 CULTURE OTHR SPECIMN AEROBIC: CPT | Performed by: OTOLARYNGOLOGY

## 2023-11-09 PROCEDURE — 87076 CULTURE ANAEROBE IDENT EACH: CPT | Performed by: OTOLARYNGOLOGY

## 2023-11-09 PROCEDURE — 99213 OFFICE O/P EST LOW 20 MIN: CPT | Performed by: OTOLARYNGOLOGY

## 2023-11-09 PROCEDURE — 87107 FUNGI IDENTIFICATION MOLD: CPT | Performed by: OTOLARYNGOLOGY

## 2023-11-09 NOTE — LETTER
11/9/2023         RE: Juan Treadwell  [address]  [facility] [address]        Dear Colleague,    Thank you for referring your patient, Juan Treadwell, to the [facility]. Please see a copy of my visit note below.    History of Present Illness - Juan Treadwell is a 58 year old male here to see me in follow up from 2/24/2023, with an intiial visit on 7/1/21.    To review,  for a long time he has had a chronic ulceration and crust in the nose.  It can be painful and he always has to be cleaning out these crusts.  There is also post nasal drainage from this as well.  It has been off an don for years, but has become very unbearable for the last 2 months.  It is causing a lot of issues with his CPAP as well.  He has used CPAP for over five years.    On exam, I found a previously undiagnosed large septal perforation and florid purulent rhinitis with pus covered crusts bilaterally.  I cultured, and used the results to place him on an antibiotic nasal irrigation.  He is here for follow up.  Of note, it was not possible to understand if the purulent rhinitis and recurrent digital manipulation have caused the perforation, or if the perforation was there and caused the dryness and subsequent infeciton.    On exam on 8/20/21, there had been a dramatic improvement.  About 90% better, but the perforation was unchanged.  Therefore I asked him to continue the medicated irrigations, to see if control of the purulence would allow some spontaneous improvement of the perforation.  He tells me that his symptoms are all dramatically better.  No congestion or fullness and he was lost to follow up until now.    He has returned due to the slow return of all his previous symptoms of congestion, nasal discomfort, and bloody crusts.  He tells me that things were good for a while, but over the last 6 months, especially with the onset of winter 2021 things all came back.  I use the same nasal irrigation and he was lost  to follow up.    He tells me that it worked again but about mid summer the symptoms started to come back.  It comes and goes, but the main irritation is the throat is very raw and sensitive by the end of summer.      Past medical history -   Patient Active Problem List   Diagnosis     DIAMOND (obstructive sleep apnea)- moderate (AHI 17)     Hyperlipidemia LDL goal <130     Allergic rhinitis, unspecified allergic rhinitis type     Male hypogonadism     Erectile dysfunction, unspecified erectile dysfunction type     Gastroesophageal reflux disease without esophagitis     Restless legs syndrome (RLS)     Moderate persistent asthma without complication     Nasal septal perforation     Class 1 obesity due to excess calories with serious comorbidity and body mass index (BMI) of 34.0 to 34.9 in adult     Impingement syndrome of left shoulder       /87   Pulse 80   Wt 101 kg (222 lb 9.6 oz)   SpO2 96%   BMI 33.85 kg/m      General - The patient is well nourished and well developed, and appears to have good nutritional status.  Alert and oriented to person and place, answers questions and cooperates with examination appropriately.   Head and Face - Normocephalic and atraumatic, with no gross asymmetry noted of the contour of the facial features.  The facial nerve is intact, with strong symmetric movements.  Eyes - Extraocular movements intact, and the pupils were reactive to light.  Sclera were not icteric or injected, conjunctiva were pink and moist.  Mouth - Examination of the oral cavity shows pink, healthy, moist mucosa.  No lesions or ulceration noted.  The dentition are in good repair.  The tongue is mobile and midline.  Nose - The nasal cavity is back to where it was before, covered with bloody and purulent crusts, the perforation is the same size, about 1.5 cm    A/P - Juan Treadwell  (J31.0) Purulent rhinitis  (primary encounter diagnosis)  (J34.89) Nasal septal perforation  (J31.0) Chronic rhinitis  (J34.89)  Nasal obstruction    The same issues of rhinitis sicca and purulent crusting has returned.  We discussed again the difficulty in this situation, as with a perforation of his size, there will always be abnormal air flow and drying, leading to this situation.    I have taken cultures and will design a new rinse based on that.    For his irritated throat, I will send in magic mouthwash      Again, thank you for allowing me to participate in the care of your patient.        Sincerely,        Mendez Palmer MD

## 2023-11-11 LAB — BACTERIA SPEC CULT: NORMAL

## 2023-11-13 DIAGNOSIS — J31.0 PURULENT RHINITIS: ICD-10-CM

## 2023-11-13 DIAGNOSIS — J31.0 CHRONIC RHINITIS: ICD-10-CM

## 2023-11-13 DIAGNOSIS — J34.89 NASAL SEPTAL PERFORATION: ICD-10-CM

## 2023-11-13 NOTE — RESULT ENCOUNTER NOTE
Called patient and left a voicemail.  Cultures show new growth of fungus.  Will change the medicated nasal rinses to include Itraconazole in addition to Gent and Dex.

## 2023-11-16 LAB — BACTERIA SPEC CULT: ABNORMAL

## 2023-11-20 ENCOUNTER — TELEPHONE (OUTPATIENT)
Dept: OTOLARYNGOLOGY | Facility: CLINIC | Age: 58
End: 2023-11-20
Payer: COMMERCIAL

## 2023-11-20 NOTE — TELEPHONE ENCOUNTER
A prior authorization is needed for the following compounded medications prescribed.  Please complete a prior authorization with the information included below.    Medication:Gentamic 160/bsgb677/ybda223th/l milagro (COMPOUNDED)    Ingredients                                                                  NDCs                                                Quantities                         Itraconazole 10mg/ml soln    [address]-90   20  Gentamicin sulfate 40mg/ml soln   [address]-73   4  Dexamethasone sodium phosp 12   [address]-58   30  Propylene glycol liqd     [address]-75   25  Sodium chloride 0.9% soln    [address]-06   1000      RX #: [id number]  Reason for Rejection:product/service not covered- plan/benefit exclusion    Pharmacy Insurance plan:[facility] commercial  BIN #:[id number]  ID #:[id number]  PCN #:[facility]  Phone #:[phone]      Pharmacy NPI:[id number]      Please advise the Compounding Pharmacy @ [phone] when the prior authorization is approved or denied.     Thank you for your time.    Siobhan Evans CPhT, ALAINA    [facility]  [address] [place] [id number]   [email or web address]  [email or web address]   Phone: [phone]  Fax: [phone]

## 2023-11-22 NOTE — TELEPHONE ENCOUNTER
Central Prior Authorization Team  Phone: [phone]    PA Initiation    Medication: COMPOUNDED NON-CONTROLLED (CMPD RX) - PHARMACY TO MIX COMPOUNDED MEDICATION  Insurance Company: [employer] - Phone [phone] Fax [phone]  Pharmacy Filling the Rx: [facility] - [place], [place] - [address]  Filling Pharmacy Phone: [phone]  Filling Pharmacy Fax:    Start Date: 11/22/2023  Faxed pa to insurance.

## 2023-11-28 NOTE — TELEPHONE ENCOUNTER
PRIOR AUTHORIZATION DENIED    Medication: COMPOUNDED NON-CONTROLLED (CMPD RX) - PHARMACY TO MIX COMPOUNDED MEDICATION  Insurance Company: [employer] Review - Phone [phone] Fax [phone]  Denial Date: 11/28/2023    Denial Rational: Ingredient(s) are excluded from the pt's pharmacy benefit.      Appeal Information: If provider would like to appeal please provide a letter of medical necessity.

## 2023-11-28 NOTE — TELEPHONE ENCOUNTER
Per call to [employer], there is no pa in process for this compound drug.  Re-faxed pa to insurance.

## 2023-12-07 LAB
BACTERIA SPEC CULT: ABNORMAL

## 2023-12-10 DIAGNOSIS — N52.9 ERECTILE DYSFUNCTION, UNSPECIFIED ERECTILE DYSFUNCTION TYPE: Chronic | ICD-10-CM

## 2023-12-11 RX ORDER — TADALAFIL 5 MG/1
TABLET ORAL
Qty: 90 TABLET | Refills: 0 | Status: SHIPPED | OUTPATIENT
Start: 2023-12-11 | End: 2024-03-05

## 2023-12-13 DIAGNOSIS — N52.9 ERECTILE DYSFUNCTION, UNSPECIFIED ERECTILE DYSFUNCTION TYPE: Chronic | ICD-10-CM

## 2023-12-13 RX ORDER — TADALAFIL 5 MG/1
TABLET ORAL
Qty: 90 TABLET | Refills: 0 | OUTPATIENT
Start: 2023-12-13

## 2023-12-14 ENCOUNTER — MYC MEDICAL ADVICE (OUTPATIENT)
Dept: OTOLARYNGOLOGY | Facility: CLINIC | Age: 58
End: 2023-12-14
Payer: COMMERCIAL

## 2023-12-14 NOTE — LETTER
12/14/2023        RE: Juan Treadwell  [address]  [facility] [address]        To Whom it May Concern:    I would like to request an appeal for the ITRACONAZOLE, GENTAMICIN, DEXAMETHASONE, PROPYLENE GYCOL, SODIUM CHLORIDE 0.9% COMPOUND. This patient has had chronic rhinitis and purulent sinusitis. This patient has been on multiple antibiotic rinses and steroid rinses that have not seemed to completely resolve this issue. A culture was completed and showed nonsporulating dematiceous fungus, cladosporium species and alternaria species. Due to this residual fungus being present, despite past rinses and medications completed, this funges would best be treated by this compounding medication. Therefore, this is medically necessary to provide Juan with the GTRACONAZOLE, GENTAMICIN, DEXAMETHASONE, PROPYLENE GYCOL, SODIUM CHLORIDE 0.9% COMPOUND as requested initially. If this is denied, I anticipate the fungus will not completely resolve.       Sincerely,        Mendez Palmer MD

## 2023-12-14 NOTE — LETTER
Re: Juan     To Whom it May Concern:    We would like to request an appeal for the ITRACONAZOLE, GENTAMICIN, DEXAMETHASONE, PROPYLENE GYCOL, SODIUM CHLORIDE 0.9% COMPOUND. This patient has had chronic rhinitis and purulent sinusitis. This patient has been on multiple antibiotic rinses and steroid rinses that have not seemed to completely resolve this issue. A culture was completed  Patient still has some residual fungus in the right maxillary that would best be treated by this compounding medication. Therefore, this is medically necessary to provide Shanta with the GENTAMICIN 160mg / DEXAMETH 120 mg NASAL IRRIGATION as requested initially. If this is denied, I anticipate the sinus infection will not completely resolve.

## 2023-12-18 NOTE — TELEPHONE ENCOUNTER
Hello,  Please have the pt complete and sign the Authorization for Disclosure of Health information forms below for the pa team to initiate the appeal process.  Insurance plan will not review appeal without these forms. Please fax completed forms to the PA Team at [phone] and route encounter back to the pa team. Thanks

## 2023-12-19 NOTE — TELEPHONE ENCOUNTER
Called patient to have form signed. Patient confirmed understanding and stated he would get this completed.     Hien VIEIRA RN, [facility] 12/19/23 10:21 AM

## 2023-12-19 NOTE — TELEPHONE ENCOUNTER
Physician Discharge Summary     Patient ID:  Chinmay Fine  [id number]  35 year old  1984    Admit date: 6/17/2019    Discharge date and time: 6/18/2019    Admitting Physician: Linda Armstrong MD     Discharge Physician: Myke Sagastume MD     Admission Diagnoses: CHEST PAIN    Discharge Diagnoses: Musculoskeletal chest pain  Alcohol abuse    Admission Condition: fair    Discharged Condition: stable    Hospital Course: This 35 year old gentleman was admitted yesterday with chest pain for weeks. Chart review reveals he was seen at ER different facility over lat 12 days or so. A CTPA done on 6/11/19 was negative for PE,  Please review H&P for details. During his stay he would accept pain medication if given intravenously but not orally. Care team has noted drug seeking behavior.   Patient is a current smoker and drinks alcohol quite heavily. He denies any personal or family history of coronary artery disease. Patient's ECG was in normal sinus rhythm and Troponin was negative x 3. A further cardiac workup was deemed un necessary. Patient was observed on telemetry and CIWA protocol ws performed. He was given Thiamine and folic acid here and I would continue at outpatient. Patient was seen by PT and OT and was deemed safe to go home for independent living., I added a oral NSAID for musculoskeletal chest pain and a PPI for gastritis prevention. Patient does not have a PCP. I have advised him to check with [facility] to get a PCP for follow up.     Consults: none    Significant Diagnostic Studies: labs: CBC show borderline high AST 53, ALT is normal, Alkaline phosphatase high 135, , CMP normal, CPK high 2,781, CK MB not checked, Troponin I Ultra sensitive x 3 negative and radiology: CXR: normal    Treatments: analgesia: Ketorolac IV x once and Thiamine, folic acid, Protonix    Discharge Exam:  Visit Vitals  /86 (BP [place]: [facility] [place] Position: Semi-Monk's)   Pulse 55   Temp 98.3 °F (36.8 °C) (Oral)  Refilled      Resp 16   Ht 5' 9\" (1.753 m)   Wt 92.6 kg   SpO2 96%   BMI 30.15 kg/m²       General Appearance:    Alert, cooperative, no distress, appears stated age   Head:    Normocephalic, without obvious abnormality, atraumatic   Eyes:    Pupils eqiual, round, reactive to light, conjunctivae/corneas    clear, extraocular movements intact, fundi benign, both eyes    Ears:    Normal tympanic membranes and external ear canals, both     ears   Nose:   Nares normal, septum midline, mucosa normal, no drainage    or sinus tenderness   Throat:   Lips, mucosa, and tongue normal; teeth and gums normal   Back:     Symmetric, no curvature, range of motion normal, no     costovertebral angle tenderness   Lungs:     Good air entry, breath sound is clear to auscultation bilaterally, respirations unlabored   Chest wall:    (+) left parasternal tenderness, no deformity   Heart:    Regular rate and rhythm, S1 and S2 normal, no murmur, rub   or gallop   Abdomen:     Soft, non-tender, bowel sounds active all four quadrants,     no masses, no organomegaly   Extremities:   Extremities normal, atraumatic, no cyanosis or edema   Pulses:   2+ and symmetric all extremities   Skin:   Skin color, texture, turgor normal, no rashes or lesions   Lymph nodes:   Cervical, supraclavicular, and axillary nodes normal   Neurologic:   Cranial nerves II-XII intact. Normal strength, sensation and      reflexes throughout       Disposition: Home    Patient Instructions:   Activity: activity as tolerated  Diet: regular diet  Wound Care: none needed    Follow-up with No PCP in 1 weeks.       HTN (hypertension)

## 2023-12-20 DIAGNOSIS — E66.811 CLASS 1 OBESITY DUE TO EXCESS CALORIES WITH SERIOUS COMORBIDITY AND BODY MASS INDEX (BMI) OF 34.0 TO 34.9 IN ADULT: ICD-10-CM

## 2023-12-20 DIAGNOSIS — E66.09 CLASS 1 OBESITY DUE TO EXCESS CALORIES WITH SERIOUS COMORBIDITY AND BODY MASS INDEX (BMI) OF 34.0 TO 34.9 IN ADULT: ICD-10-CM

## 2023-12-20 RX ORDER — SEMAGLUTIDE 1 MG/.5ML
INJECTION, SOLUTION SUBCUTANEOUS
Qty: 2 ML | Refills: 0 | Status: SHIPPED | OUTPATIENT
Start: 2023-12-20 | End: 2023-12-26 | Stop reason: DRUGHIGH

## 2023-12-21 ENCOUNTER — LAB (OUTPATIENT)
Dept: LAB | Facility: CLINIC | Age: 58
End: 2023-12-21
Payer: COMMERCIAL

## 2023-12-21 DIAGNOSIS — E66.811 CLASS 1 OBESITY DUE TO EXCESS CALORIES WITH SERIOUS COMORBIDITY AND BODY MASS INDEX (BMI) OF 34.0 TO 34.9 IN ADULT: ICD-10-CM

## 2023-12-21 DIAGNOSIS — E66.09 CLASS 1 OBESITY DUE TO EXCESS CALORIES WITH SERIOUS COMORBIDITY AND BODY MASS INDEX (BMI) OF 34.0 TO 34.9 IN ADULT: ICD-10-CM

## 2023-12-21 LAB
ANION GAP SERPL CALCULATED.3IONS-SCNC: 13 MMOL/L (ref 7–15)
BUN SERPL-MCNC: 15.2 MG/DL (ref 6–20)
CALCIUM SERPL-MCNC: 10 MG/DL (ref 8.6–10)
CHLORIDE SERPL-SCNC: 101 MMOL/L (ref 98–107)
CREAT SERPL-MCNC: 1.03 MG/DL (ref 0.67–1.17)
DEPRECATED HCO3 PLAS-SCNC: 23 MMOL/L (ref 22–29)
EGFRCR SERPLBLD CKD-EPI 2021: 85 ML/MIN/1.73M2
GLUCOSE SERPL-MCNC: 133 MG/DL (ref 70–99)
POTASSIUM SERPL-SCNC: 4.7 MMOL/L (ref 3.4–5.3)
SODIUM SERPL-SCNC: 137 MMOL/L (ref 135–145)

## 2023-12-21 PROCEDURE — 80048 BASIC METABOLIC PNL TOTAL CA: CPT

## 2023-12-21 PROCEDURE — 36415 COLL VENOUS BLD VENIPUNCTURE: CPT

## 2023-12-24 ENCOUNTER — TRANSFERRED RECORDS (OUTPATIENT)
Dept: HEALTH INFORMATION MANAGEMENT | Facility: CLINIC | Age: 58
End: 2023-12-24
Payer: COMMERCIAL

## 2023-12-26 RX ORDER — SEMAGLUTIDE 1.7 MG/.75ML
1.7 INJECTION, SOLUTION SUBCUTANEOUS
Qty: 3 ML | Refills: 0 | Status: SHIPPED | OUTPATIENT
Start: 2023-12-26 | End: 2024-01-31

## 2023-12-26 NOTE — TELEPHONE ENCOUNTER
Called patient and he is good to go and getting his rinses.       Hien VIEIRA RN, [facility] 12/26/23 12:50 PM

## 2023-12-27 ENCOUNTER — TRANSFERRED RECORDS (OUTPATIENT)
Dept: HEALTH INFORMATION MANAGEMENT | Facility: CLINIC | Age: 58
End: 2023-12-27
Payer: COMMERCIAL

## 2024-01-02 ENCOUNTER — TELEPHONE (OUTPATIENT)
Dept: SURGERY | Facility: CLINIC | Age: 59
End: 2024-01-02
Payer: COMMERCIAL

## 2024-01-02 NOTE — TELEPHONE ENCOUNTER
PA request came from [employer] for Wegovy 1.7.  Can see that electronic approval done/approved 12/26/23.  Can't see for what dates it was done.  Called [facility] and spoke with Snehal who was not able to tell as well.  Contacted PA team for dates.  Will wait to hear back prior to starting another PA.  Keena Guerrero, MS, RD, RN

## 2024-01-02 NOTE — TELEPHONE ENCOUNTER
Per reply from PA Team, Jeanine Hatch:  Approved till 12/26/2024.   No further PA needed.  Keena Guerrero, MS, RD, RN

## 2024-01-04 ENCOUNTER — TELEPHONE (OUTPATIENT)
Dept: OTOLARYNGOLOGY | Facility: CLINIC | Age: 59
End: 2024-01-04
Payer: COMMERCIAL

## 2024-01-04 NOTE — TELEPHONE ENCOUNTER
Letter was written for Gentamicin/Itraconazole/Dexamethasone, 160/200/120mg/liter saline PA appeal. Can someone follow up on this and see if the letter was sent and if we have heard anything back from insurance.     Hien VIEIRA RN, [facility] 01/04/24 11:11 AM

## 2024-01-04 NOTE — TELEPHONE ENCOUNTER
Per telephone encounter dated 11/20/23. The pa team has not receive all the required information from the clinic to start this appeal process.  Has the pt complete and sign the ADHI forms below? Without the ADHI, insurance will not review our appeal request.    Milton DONALDSON    12/18/23  8:16 AM  Note  Hello,  Please have the pt complete and sign the Authorization for Disclosure of Health information forms below for the pa team to initiate the appeal process.  Insurance plan will not review appeal without these forms. Please fax completed forms to the PA Team at [phone] and route encounter back to the pa team. Thanks

## 2024-01-04 NOTE — TELEPHONE ENCOUNTER
Medication Appeal Initiation    Medication: COMPOUNDED NON-CONTROLLED (CMPD RX) - PHARMACY TO MIX COMPOUNDED MEDICATION  Appeal Start Date:  1/4/2024  Insurance Company: [employer] [place]  Insurance Phone:   Insurance Fax:   Comments: Faxed appeal letter to insurance.

## 2024-01-04 NOTE — TELEPHONE ENCOUNTER
The patient called the insurance company and completed these forms with them.     Hien VIEIRA RN, [facility] 01/04/24 2:31 PM

## 2024-01-04 NOTE — TELEPHONE ENCOUNTER
Appeal has been initiated/ letter faxed to insurance.  Please see the original encounter from 11/20/23 for appeal updates. Thanks

## 2024-01-09 NOTE — TELEPHONE ENCOUNTER
Per call to [facility] [place] provider services # [phone], appeal was not received.   Representative Kingston verified that fax# [phone] is correct. Re-faxing appeal info.

## 2024-01-10 NOTE — TELEPHONE ENCOUNTER
Received a call from Nathalie at [facility] appeals dept. Appeal has been received, but it will be reviewed as standard time frame.  Turnaround time may take up to 14 days.   [facility] appeal dept Ph# [phone]

## 2024-01-18 NOTE — TELEPHONE ENCOUNTER
Per call to [employer] provider's line, appeal is pending for review. No turnaround time (usually takes 30 days).  Appeal reference # [id number]

## 2024-01-19 ENCOUNTER — TRANSFERRED RECORDS (OUTPATIENT)
Dept: HEALTH INFORMATION MANAGEMENT | Facility: CLINIC | Age: 59
End: 2024-01-19
Payer: COMMERCIAL

## 2024-01-25 NOTE — TELEPHONE ENCOUNTER
Per call to [facility] provider services line ph# [phone], appeal is currently pending for review.  Appeal reference # [id number]

## 2024-01-30 NOTE — PROGRESS NOTES
Return Medical Weight Management Note         Juan Treadwell  MRN:  [id number]  :  1965  MAURICIO:  2024        Dear Kelli Porter MD,    I had the pleasure of seeing your patient Juan Treadwell. He is a 58 year old male who I am continuing to see for treatment of obesity related to:        2022     2:11 PM   --   I have the following health issues associated with obesity High Cholesterol    Sleep Apnea    GERD (Reflux)   I have the following symptoms associated with obesity Knee Pain    Back Pain    Hip Pain       Assessment   Problem List Items Addressed This Visit       DIAMOND (obstructive sleep apnea)- moderate (AHI 17) (Chronic)    Class 1 obesity due to excess calories with serious comorbidity and body mass index (BMI) of 31.0 to 31.9 in adult - Primary     Continue Wegovy         Relevant Medications    Semaglutide-Weight Management (WEGOVY) 1.7 MG/0.75ML pen          PLAN/DISCUSSION:  Congratulated patient on overall weight loss!  Will continue wegovy 1.7 mg. Continued to emphasize the importance of diet and lifestyle for long term success.       FOLLOW-UP:  3 months      SUBJECTIVE/OBJECTIVE:  Last seen 10/24/2023 and was was bridged from Saxenda to wegovy.  Had some previous concerns with lab results and took some time off the saxenda. Most recent lab 2023 WNL.   Has noticed quite a bit more appetitive suppression than with the saxenda. Continues on the 1.7 mg does. Tolerating without issues.     Has not been able to exercise much due to back pain. Will be seeing spine/pain clinic soon. Has had an epidural for pain management.     Anti-obesity medications:   Current: wegovy 1.7 on   Side effects:  Denies nausea, vomiting, abdominal pain, severe constipation, diarrhea, or heartburn      Antiobesity medication history:  Phentermine (Lomaira, Adipex) Not real effective   Phentermine/Topiramate (Qsymia)    Bupropion/Naltrexone (Contrave) Lost weight initially 10-12 lbs    Liraglutide (Saxenda)    Semaglutide (Wegovy)    Tirzepatide (Zepbound)    Orlistat (Xenical/Jorge)    Off-Label Medications for Obesity  Semaglutide (Ozempic)    Tirzepatide (Mounjaro)    Bupropion (Wellbutrin)    Metformin(Glucophage)    Topiramate (Topamax) Caution due to hx of kidney stones             1/31/2024     1:01 PM   Weight Loss Medication History Reviewed With Patient   Which weight loss medications are you currently taking on a regular basis? Wegovy   Are you having any side effects from the weight loss medication that we have prescribed you? No       Lab on 12/21/2023   Component Date Value Ref Range Status    Sodium 12/21/2023 137  135 - 145 mmol/L Final    Reference intervals for this test were updated on 09/26/2023 to more accurately reflect our healthy population. There may be differences in the flagging of prior results with similar values performed with this method. Interpretation of those prior results can be made in the context of the updated reference intervals.     Potassium 12/21/2023 4.7  3.4 - 5.3 mmol/L Final    Chloride 12/21/2023 101  98 - 107 mmol/L Final    Carbon Dioxide (CO2) 12/21/2023 23  22 - 29 mmol/L Final    Anion Gap 12/21/2023 13  7 - 15 mmol/L Final    Urea Nitrogen 12/21/2023 15.2  6.0 - 20.0 mg/dL Final    Creatinine 12/21/2023 1.03  0.67 - 1.17 mg/dL Final    GFR Estimate 12/21/2023 85  >60 mL/min/1.73m2 Final    Calcium 12/21/2023 10.0  8.6 - 10.0 mg/dL Final    Glucose 12/21/2023 133 (H)  70 - 99 mg/dL Final       Recent diet changes: 64 oz water, 20 of diet coke. Alcoholic beverages = 3 weeks  B: Forgets breakfast most days. - might have a yogurt   L:  Soup - home made  D: Pizza - ordered in  Not much snacking with the Wegovy     Recent exercise/activity changes: walking when he can. But due to back pain is more limited    CURRENT WEIGHT:   213 lbs 3.2 oz    Initial Weight (lbs): 227 lbs  Last Visits Weight: 225 lb (102.1 kg)  Cumulative weight loss (lbs):  13.8  Weight Loss Percentage: 6.08%        1/31/24    8:05 AM   Changes and Difficulties   I have made the following changes to my diet since my last visit: Have not made many changes   With regards to my diet, I am still struggling with: Late night snacks - no longer an issues   I have made the following changes to my activity/exercise since my last visit: Playing Cleverlize ball regularly and walking daily   With regards to my activity/exercise, I am still struggling with: Shoulder, hip and back pain.         MEDICATIONS:   Current Outpatient Medications   Medication Sig Dispense Refill    atorvastatin (LIPITOR) 80 MG tablet Take 1 tablet (80 mg) by mouth daily 90 tablet 2    COMPOUNDED NON-CONTROLLED SUBSTANCE (CMPD RX) - PHARMACY TO MIX COMPOUNDED MEDICATION Apply 20 mLs into each nare 2 times daily Gentamicin/Itraconazole/Dexamethasone, 160/200/120mg/liter saline 2000 mL 11    COMPOUNDED NON-CONTROLLED SUBSTANCE (CMPD RX) - PHARMACY TO MIX COMPOUNDED MEDICATION Swish and swallow 10 mLs in mouth every 3 hours as needed 80mL viscous lidocaine 2%, 80mL Mylanta, 80mL diphenhydramine 12.5mg/5mL, 80mL Nystatin 100,000U suspension, 80mL prednisolone 15mg/5mL solution, 80mL distilled water 480 mL 7    famotidine (PEPCID) 40 MG tablet TAKE ONE TABLET BY MOUTH ONCE DAILY 90 tablet 3    montelukast (SINGULAIR) 10 MG tablet TAKE ONE TABLET BY MOUTH AT BEDTIME 90 tablet 2    Multiple Vitamins-Minerals (MULTIVITAMIN OR)       order for DME Resmed Airsense 10 auto cpap 10-14 cm, Mirage Fx nasal mask std      rOPINIRole (REQUIP) 2 MG tablet Take 2 tablets (4 mg) by mouth At Bedtime 180 tablet 3    Semaglutide-Weight Management (WEGOVY) 1.7 MG/0.75ML pen Inject 1.7 mg Subcutaneous every 7 days 9 mL 0    tadalafil (CIALIS) 5 MG tablet TAKE ONE TABLET BY MOUTH ONCE DAILY 90 tablet 0    albuterol (PROAIR HFA/PROVENTIL HFA/VENTOLIN HFA) 108 (90 Base) MCG/ACT inhaler Inhale 2 puffs into the lungs every 4 hours as needed for shortness of  "breath / dyspnea or wheezing (Patient not taking: Reported on 10/24/2023) 1 Inhaler 2    Cholecalciferol (VITAMIN D) 2000 UNITS tablet Take 2,000 Units by mouth (Patient not taking: Reported on 1/31/2024)      COMPOUNDED NON-CONTROLLED SUBSTANCE (CMPD RX) - PHARMACY TO MIX COMPOUNDED MEDICATION Apply 20 mLs into each nare 2 times daily Gentamicin/Dexamethasone 160/120mg/liter saline 2000 mL 6    insulin pen needle (UNIFINE PENTIPS PLUS) 31G X 5 MM miscellaneous USE 1 PEN NEEDLE DAILY OR AS DIRECTED (Patient not taking: Reported on 1/31/2024) 100 each 1    liraglutide - Weight Management (SAXENDA) 18 MG/3ML pen Inject 1.8 mg Subcutaneous daily Inject 1.8 mg subcutaneous daily (Patient not taking: Reported on 1/31/2024) 15 mL 1         ROS:  General  Fatigue: No  Sleep Quality: OK      PHYSICAL EXAM:  Objective    /81 (BP Location: Left arm, Patient Position: Sitting, Cuff Size: Adult Large)   Pulse 74   Ht 5' 9\" (1.753 m)   Wt 213 lb 3.2 oz (96.7 kg)   SpO2 94%   BMI 31.48 kg/m    GENERAL: Healthy, alert and no distress  EYES: Eyes grossly normal to inspection.  No discharge or erythema, or obvious scleral/conjunctival abnormalities.  RESP: No audible wheeze, cough, or visible cyanosis.  No visible retractions or increased work of breathing.    SKIN: Visible skin clear. No significant rash, abnormal pigmentation or lesions.  NEURO: Cranial nerves grossly intact.  Mentation and speech appropriate for age.  PSYCH: Mentation appears normal, affect normal/bright, judgement and insight intact, normal speech and appearance well-groomed.        Sincerely,    Micha Arroyo PA-C    15 minutes spent on the date of the encounter doing chart review, review of test results, patient visit and documentation     "

## 2024-01-31 ENCOUNTER — OFFICE VISIT (OUTPATIENT)
Dept: SURGERY | Facility: CLINIC | Age: 59
End: 2024-01-31
Payer: COMMERCIAL

## 2024-01-31 VITALS
BODY MASS INDEX: 31.58 KG/M2 | HEART RATE: 74 BPM | WEIGHT: 213.2 LBS | OXYGEN SATURATION: 94 % | SYSTOLIC BLOOD PRESSURE: 121 MMHG | DIASTOLIC BLOOD PRESSURE: 81 MMHG | HEIGHT: 69 IN

## 2024-01-31 DIAGNOSIS — G47.33 OSA (OBSTRUCTIVE SLEEP APNEA): Chronic | ICD-10-CM

## 2024-01-31 DIAGNOSIS — E66.811 CLASS 1 OBESITY DUE TO EXCESS CALORIES WITH SERIOUS COMORBIDITY AND BODY MASS INDEX (BMI) OF 31.0 TO 31.9 IN ADULT: Primary | ICD-10-CM

## 2024-01-31 DIAGNOSIS — E66.09 CLASS 1 OBESITY DUE TO EXCESS CALORIES WITH SERIOUS COMORBIDITY AND BODY MASS INDEX (BMI) OF 31.0 TO 31.9 IN ADULT: Primary | ICD-10-CM

## 2024-01-31 PROCEDURE — 99213 OFFICE O/P EST LOW 20 MIN: CPT | Performed by: PHYSICIAN ASSISTANT

## 2024-01-31 RX ORDER — SEMAGLUTIDE 1.7 MG/.75ML
1.7 INJECTION, SOLUTION SUBCUTANEOUS
Qty: 9 ML | Refills: 0 | Status: SHIPPED | OUTPATIENT
Start: 2024-01-31 | End: 2024-05-15

## 2024-01-31 NOTE — TELEPHONE ENCOUNTER
Please see appeal updated notes on the original encounter from 11/20/23.  Please close this encounter. Thanks

## 2024-01-31 NOTE — PATIENT INSTRUCTIONS
"Nice to talk with you today! Thank you for allowing me the privilege of caring for you.   We hope we provided you with the excellent service you deserve.     To ensure the quality of our services you may receive a patient satisfaction survey from an independent monitoring company.  The greatest compliment you can give is \"Likely to Recommend\"      Below is our plan we discussed.-  MUNDO Muse      Continue wegovy    Please call [phone] and schedule a follow up with Micha Arroyo PA-C in 3 months.  If you need to reach me sooner you can do so by calling [phone].    Have a great day!     Eat Better ? Move More ? Live Well    Eat 3 nutrient-rich meals each day    Don t skip meals--it will cause you to overeat later in the day!    Eating fiber (vegetables/fruits/whole grains) and protein with meals helps you stay full longer    Choose foods with less than 10 grams of sugar and 5 grams of fat per serving to prevent excess calories and weight re-gain  Eat around the same times each day to develop a routine eating schedule   Avoid snacking unless physically hungry.   Planned snacks: 1-2 times per day and no more than 150 calories    Eat protein first   Protein helps with healing, maintaining adequate muscle mass, reducing hunger and optimizing nutritional status   Aim for 60-80 grams of protein per day   Fill up on Fiber   Fiber comes from plants--fruits, veggies, whole grains, nuts/seeds and beans   Fiber is low in calories, high in phytonutrients and helps you stay full longer   Aim for 25-35 grams per day by eating fiber with meals and snacks  Eat S-L-O-W-L-Y   Take 20-30 minutes to eat each meal by taking small bites, chewing foods to applesauce consistency or 20-30 times before you swallow   Eating foods too fast can delay satiety/fullness signals and increase overeating   Slow down your eating by using toddler utensils, putting your fork/spoon down between bites and not watching TV or emailing during meals! "   Keep a Journal         Writing down what you eat, how you feel and when you are active helps you identify new changes to work on from week to week         Look for ways to cut 100 calories from your current diet 2-3 times per day  Drink 64 ounces of 0-Calorie drinks between meals   Water   Zero calorie Propel  or Vitamin Water     SoBe Lifewater  Zero Calories   Crystal Light , Sugar-Free Joselito-Aid , and other sugar-free lemonade or flavored coleman   Keep Caffeine to less than 300mg per day ie: 3-6oz cups coffee     Work up to 45-60 minutes of physical activity most days of the week   Helps with losing weight and prevent regaining those extra pounds!    Do a combo of cardio (walking/water exercises) and strength training (lifting weights/Vinyasa yoga)    Avoid Mindless Eating   Be present when you eat--take note of the smell, taste and quality of your food   Make a list of alternative activities you could do to prevent eating out of boredom/stress  Go for a walk, call a friend, chew gum, paint your nails, re-organize the garage, etc

## 2024-01-31 NOTE — TELEPHONE ENCOUNTER
Spoke with Hien at [facility] appeals, this medication appeal has been denied. At least one of the ingredients within the compound is an over-the-counter medication and that is causing the compound to be denied. Denial letter is currently being sent out. Should expect denial letter in a couple of days.    Appeals and Grievances ph: [phone]  Appeal ref# [id number]

## 2024-02-05 NOTE — TELEPHONE ENCOUNTER
MEDICATION APPEAL DENIED    Medication: COMPOUNDED NON-CONTROLLED (CMPD RX) - PHARMACY TO MIX COMPOUNDED MEDICATION  Insurance Company:   Denial Date: 1/29/2024    Denial Reason(s):     Second Level Appeal Information: Second level appeals will be managed by the clinic staff and provider. Please contact the [employer] Prior Authorization Team if additional information about the denial is needed.

## 2024-02-27 ENCOUNTER — TRANSFERRED RECORDS (OUTPATIENT)
Dept: HEALTH INFORMATION MANAGEMENT | Facility: CLINIC | Age: 59
End: 2024-02-27
Payer: COMMERCIAL

## 2024-02-29 ENCOUNTER — TRANSFERRED RECORDS (OUTPATIENT)
Dept: HEALTH INFORMATION MANAGEMENT | Facility: CLINIC | Age: 59
End: 2024-02-29
Payer: COMMERCIAL

## 2024-03-04 DIAGNOSIS — E78.5 HYPERLIPIDEMIA LDL GOAL <130: Chronic | ICD-10-CM

## 2024-03-04 DIAGNOSIS — N52.9 ERECTILE DYSFUNCTION, UNSPECIFIED ERECTILE DYSFUNCTION TYPE: Chronic | ICD-10-CM

## 2024-03-04 DIAGNOSIS — J45.40 MODERATE PERSISTENT ASTHMA WITHOUT COMPLICATION: Chronic | ICD-10-CM

## 2024-03-05 RX ORDER — TADALAFIL 5 MG/1
TABLET ORAL
Qty: 90 TABLET | Refills: 0 | Status: SHIPPED | OUTPATIENT
Start: 2024-03-05 | End: 2024-05-30

## 2024-03-05 RX ORDER — ATORVASTATIN CALCIUM 80 MG/1
80 TABLET, FILM COATED ORAL DAILY
Qty: 90 TABLET | Refills: 0 | Status: SHIPPED | OUTPATIENT
Start: 2024-03-05 | End: 2024-05-30

## 2024-03-05 RX ORDER — MONTELUKAST SODIUM 10 MG/1
TABLET ORAL
Qty: 90 TABLET | Refills: 0 | Status: SHIPPED | OUTPATIENT
Start: 2024-03-05 | End: 2024-05-30

## 2024-03-08 ENCOUNTER — PATIENT OUTREACH (OUTPATIENT)
Dept: CARE COORDINATION | Facility: CLINIC | Age: 59
End: 2024-03-08
Payer: COMMERCIAL

## 2024-03-21 ENCOUNTER — TRANSFERRED RECORDS (OUTPATIENT)
Dept: HEALTH INFORMATION MANAGEMENT | Facility: CLINIC | Age: 59
End: 2024-03-21
Payer: COMMERCIAL

## 2024-03-22 ENCOUNTER — PATIENT OUTREACH (OUTPATIENT)
Dept: CARE COORDINATION | Facility: CLINIC | Age: 59
End: 2024-03-22
Payer: COMMERCIAL

## 2024-03-22 ENCOUNTER — TELEPHONE (OUTPATIENT)
Dept: FAMILY MEDICINE | Facility: CLINIC | Age: 59
End: 2024-03-22
Payer: COMMERCIAL

## 2024-03-22 NOTE — TELEPHONE ENCOUNTER
Pt called needing to be seen for physical PCP is booking out quite a bit Pt wondering if he could possibly be seen sooner. Sending to PCP for same day approval.       Shanta SUBRAMANIAN Visit Facilitator

## 2024-03-22 NOTE — TELEPHONE ENCOUNTER
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     Consider virtual visit for med check/review of her health if needed.   If more urgent medical concern present please have triaged for same day slot.

## 2024-04-13 DIAGNOSIS — K21.9 GASTROESOPHAGEAL REFLUX DISEASE WITHOUT ESOPHAGITIS: ICD-10-CM

## 2024-04-15 ENCOUNTER — TRANSFERRED RECORDS (OUTPATIENT)
Dept: HEALTH INFORMATION MANAGEMENT | Facility: CLINIC | Age: 59
End: 2024-04-15
Payer: COMMERCIAL

## 2024-04-15 RX ORDER — FAMOTIDINE 40 MG/1
TABLET, FILM COATED ORAL
Qty: 30 TABLET | Refills: 1 | Status: SHIPPED | OUTPATIENT
Start: 2024-04-15 | End: 2024-06-14

## 2024-04-26 ENCOUNTER — MYC MEDICAL ADVICE (OUTPATIENT)
Dept: OTOLARYNGOLOGY | Facility: CLINIC | Age: 59
End: 2024-04-26
Payer: COMMERCIAL

## 2024-04-26 DIAGNOSIS — J31.0 PURULENT RHINITIS: Primary | ICD-10-CM

## 2024-04-26 DIAGNOSIS — J34.89 NASAL SEPTAL PERFORATION: ICD-10-CM

## 2024-05-14 DIAGNOSIS — E66.811 CLASS 1 OBESITY DUE TO EXCESS CALORIES WITH SERIOUS COMORBIDITY AND BODY MASS INDEX (BMI) OF 30.0 TO 30.9 IN ADULT: Primary | ICD-10-CM

## 2024-05-14 DIAGNOSIS — E66.09 CLASS 1 OBESITY DUE TO EXCESS CALORIES WITH SERIOUS COMORBIDITY AND BODY MASS INDEX (BMI) OF 30.0 TO 30.9 IN ADULT: Primary | ICD-10-CM

## 2024-05-14 DIAGNOSIS — K21.9 GASTROESOPHAGEAL REFLUX DISEASE WITHOUT ESOPHAGITIS: ICD-10-CM

## 2024-05-14 NOTE — PROGRESS NOTES
Return Medical Weight Management Note         Juan Treadwell  MRN:  [id number]  :  1965  MAURICIO:  5/15/2024        Dear Kelli Porter MD,    I had the pleasure of seeing your patient Juan Treadwell. He is a 58 year old male who I am continuing to see for treatment of obesity related to:        2022     2:11 PM   --   I have the following health issues associated with obesity High Cholesterol    Sleep Apnea    GERD (Reflux)   I have the following symptoms associated with obesity Knee Pain    Back Pain    Hip Pain       Assessment   Problem List Items Addressed This Visit       Hyperlipidemia LDL goal <130 (Chronic)    Class 1 obesity due to excess calories with serious comorbidity and body mass index (BMI) of 30.0 to 30.9 in adult - Primary     Continue wegovy 1.7         Relevant Orders    Basic metabolic panel          PLAN/DISCUSSION:  Congratulated patient on overall weight loss! Will continue wegovy 1.7 mg weekly. Will continue to work on lifestyle and diet. Follow up in November    Plan:   Get lab drawn   Continue wegovy       FOLLOW-UP:  November      SUBJECTIVE/OBJECTIVE:  Patient last seen 2024 and was continued on wegovy 1.7 mg. Continues to lose weight. Does not feel like the snacking is much of a big deal anymore. Tries to go to bed earlier to get more sleep and snack less. Is active. Both shoulders have rotator cuff issues but going to PT and finds this helpful.     Anti-obesity medications:   Current: wegovy 1.7 on   Side effects:  Denies nausea, vomiting, abdominal pain, severe constipation, diarrhea, or heartburn    Antiobesity medication history:  Phentermine (Lomaira, Adipex) Not real effective   Phentermine/Topiramate (Qsymia)     Bupropion/Naltrexone (Contrave) Lost weight initially 10-12 lbs   Liraglutide (Saxenda)     Semaglutide (Wegovy)     Tirzepatide (Zepbound)     Orlistat (Xenical/Jorge)     Off-Label Medications for Obesity  Semaglutide (Ozempic)      Tirzepatide (Mounjaro)     Bupropion (Wellbutrin)     Metformin(Glucophage)     Topiramate (Topamax) Caution due to hx of kidney stones              5/14/2024     8:44 PM   Weight Loss Medication History Reviewed With Patient   Which weight loss medications are you currently taking on a regular basis? Wegovy   Are you having any side effects from the weight loss medication that we have prescribed you? No       Recent diet changes: B: 2 pieces of best and a piece of toast. Most of the times has eggs but not today  L: can of chunky chunky veggie beef  D: homemade hamburgers with salad  Less snacking. Might grab a small handful  Fluids: 80 oz water daily. 2 cans of diet coke.  About 3 alcoholic beverages weekly    Recent exercise/activity changes: plays a lot of pickle ball and walking.  Does something 5 days weekly  minutes    Recent stressors: low      CURRENT WEIGHT:   204 lbs 0 oz    Initial Weight (lbs): 227 lbs  Last Visits Weight: 213 lb (96.6 kg)  Cumulative weight loss (lbs): 23  Weight Loss Percentage: 10.13%        5/14/2024     8:44 PM   Changes and Difficulties   I have made the following changes to my diet since my last visit: Eating less and not sna wil as much.   With regards to my diet, I am still struggling with: Nothing really.   I have made the following changes to my activity/exercise since my last visit: Getting more exercise. Walking alot and olaying pickleball and golf.   With regards to my activity/exercise, I am still struggling with: Pain         MEDICATIONS:   Current Outpatient Medications   Medication Sig Dispense Refill    atorvastatin (LIPITOR) 80 MG tablet TAKE ONE TABLET BY MOUTH ONCE DAILY 90 tablet 0    Cholecalciferol (VITAMIN D) 2000 UNITS tablet Take 2,000 Units by mouth      famotidine (PEPCID) 40 MG tablet TAKE ONE TABLET BY MOUTH ONCE DAILY 30 tablet 1    montelukast (SINGULAIR) 10 MG tablet TAKE ONE TABLET BY MOUTH AT BEDTIME 90 tablet 0    Multiple Vitamins-Minerals  "(MULTIVITAMIN OR)       order for DME Resmed Airsense 10 auto cpap 10-14 cm, Mirage Fx nasal mask std      rOPINIRole (REQUIP) 2 MG tablet Take 2 tablets (4 mg) by mouth At Bedtime 180 tablet 3    tadalafil (CIALIS) 5 MG tablet TAKE ONE TABLET BY MOUTH ONCE DAILY 90 tablet 0    albuterol (PROAIR HFA/PROVENTIL HFA/VENTOLIN HFA) 108 (90 Base) MCG/ACT inhaler Inhale 2 puffs into the lungs every 4 hours as needed for shortness of breath / dyspnea or wheezing (Patient not taking: Reported on 5/15/2024) 1 Inhaler 2    COMPOUNDED NON-CONTROLLED SUBSTANCE (CMPD RX) - PHARMACY TO MIX COMPOUNDED MEDICATION Apply 20 mLs into each nare daily Mupirocin 22grams/liter saline (Patient not taking: Reported on 5/15/2024) 1000 mL 6    COMPOUNDED NON-CONTROLLED SUBSTANCE (CMPD RX) - PHARMACY TO MIX COMPOUNDED MEDICATION Swish and swallow 10 mLs in mouth every 3 hours as needed 80mL viscous lidocaine 2%, 80mL Mylanta, 80mL diphenhydramine 12.5mg/5mL, 80mL Nystatin 100,000U suspension, 80mL prednisolone 15mg/5mL solution, 80mL distilled water (Patient not taking: Reported on 5/15/2024) 480 mL 7    COMPOUNDED NON-CONTROLLED SUBSTANCE (CMPD RX) - PHARMACY TO MIX COMPOUNDED MEDICATION Apply 20 mLs into each nare 2 times daily Gentamicin/Dexamethasone 160/120mg/liter saline 2000 mL 6    insulin pen needle (UNIFINE PENTIPS PLUS) 31G X 5 MM miscellaneous USE 1 PEN NEEDLE DAILY OR AS DIRECTED (Patient not taking: Reported on 5/15/2024) 100 each 1    liraglutide - Weight Management (SAXENDA) 18 MG/3ML pen Inject 1.8 mg Subcutaneous daily Inject 1.8 mg subcutaneous daily (Patient not taking: Reported on 1/31/2024) 15 mL 1    Semaglutide-Weight Management (WEGOVY) 1.7 MG/0.75ML pen INJECT 1.7MG UNDER THE SKIN EVERY 7 DAYS 9 mL 1         ROS:  General  Fatigue: No  Sleep Quality: OK      PHYSICAL EXAM:  Objective    /78   Pulse 81   Ht 5' 9\" (1.753 m)   Wt 204 lb (92.5 kg)   SpO2 97%   BMI 30.13 kg/m    GENERAL: Healthy, alert and no " distress  EYES: Eyes grossly normal to inspection.  No discharge or erythema, or obvious scleral/conjunctival abnormalities.  RESP: No audible wheeze, cough, or visible cyanosis.  No visible retractions or increased work of breathing.    SKIN: Visible skin clear. No significant rash, abnormal pigmentation or lesions.  NEURO: Cranial nerves grossly intact.  Mentation and speech appropriate for age.  PSYCH: Mentation appears normal, affect normal/bright, judgement and insight intact, normal speech and appearance well-groomed.        Sincerely,    Micha Arroyo PA-C    20 minutes spent on the date of the encounter doing chart review, review of test results, patient visit and documentation

## 2024-05-15 ENCOUNTER — OFFICE VISIT (OUTPATIENT)
Dept: SURGERY | Facility: CLINIC | Age: 59
End: 2024-05-15
Payer: COMMERCIAL

## 2024-05-15 VITALS
HEIGHT: 69 IN | SYSTOLIC BLOOD PRESSURE: 127 MMHG | BODY MASS INDEX: 30.21 KG/M2 | WEIGHT: 204 LBS | OXYGEN SATURATION: 97 % | DIASTOLIC BLOOD PRESSURE: 78 MMHG | HEART RATE: 81 BPM

## 2024-05-15 DIAGNOSIS — E78.5 HYPERLIPIDEMIA LDL GOAL <130: Chronic | ICD-10-CM

## 2024-05-15 DIAGNOSIS — E66.811 CLASS 1 OBESITY DUE TO EXCESS CALORIES WITH SERIOUS COMORBIDITY AND BODY MASS INDEX (BMI) OF 30.0 TO 30.9 IN ADULT: Primary | ICD-10-CM

## 2024-05-15 DIAGNOSIS — E66.09 CLASS 1 OBESITY DUE TO EXCESS CALORIES WITH SERIOUS COMORBIDITY AND BODY MASS INDEX (BMI) OF 30.0 TO 30.9 IN ADULT: Primary | ICD-10-CM

## 2024-05-15 PROCEDURE — 99213 OFFICE O/P EST LOW 20 MIN: CPT | Performed by: PHYSICIAN ASSISTANT

## 2024-05-15 RX ORDER — SEMAGLUTIDE 1.7 MG/.75ML
INJECTION, SOLUTION SUBCUTANEOUS
Qty: 9 ML | Refills: 1 | Status: SHIPPED | OUTPATIENT
Start: 2024-05-15

## 2024-05-15 NOTE — PATIENT INSTRUCTIONS
"Nice to talk with you today! Thank you for allowing me the privilege of caring for you.   We hope we provided you with the excellent service you deserve.     To ensure the quality of our services you may receive a patient satisfaction survey from an independent monitoring company.  The greatest compliment you can give is \"Likely to Recommend\"      Below is our plan we discussed.-  MUNDO Muse      Continue wegovy  Get lab drawn    Please schedule a follow up with Micha Arroyo PA-C in November.  If you need to reach me sooner you can do so by calling [phone].    Have a great day!       Eat Better ? Move More ? Live Well    Eat 3 nutrient-rich meals each day    Don t skip meals--it will cause you to overeat later in the day!    Eating fiber (vegetables/fruits/whole grains) and protein with meals helps you stay full longer    Choose foods with less than 10 grams of sugar and 5 grams of fat per serving to prevent excess calories and weight re-gain  Eat around the same times each day to develop a routine eating schedule   Avoid snacking unless physically hungry.   Planned snacks: 1-2 times per day and no more than 150 calories    Eat protein first   Protein helps with healing, maintaining adequate muscle mass, reducing hunger and optimizing nutritional status   Aim for 60-80 grams of protein per day   Fill up on Fiber   Fiber comes from plants--fruits, veggies, whole grains, nuts/seeds and beans   Fiber is low in calories, high in phytonutrients and helps you stay full longer   Aim for 25-35 grams per day by eating fiber with meals and snacks  Eat S-L-O-W-L-Y   Take 20-30 minutes to eat each meal by taking small bites, chewing foods to applesauce consistency or 20-30 times before you swallow   Eating foods too fast can delay satiety/fullness signals and increase overeating   Slow down your eating by using toddler utensils, putting your fork/spoon down between bites and not watching TV or emailing during meals! "   Keep a Journal         Writing down what you eat, how you feel and when you are active helps you identify new changes to work on from week to week         Look for ways to cut 100 calories from your current diet 2-3 times per day  Drink 64 ounces of 0-Calorie drinks between meals   Water   Zero calorie Propel  or Vitamin Water     SoBe Lifewater  Zero Calories   Crystal Light , Sugar-Free Joselito-Aid , and other sugar-free lemonade or flavored coleman   Keep Caffeine to less than 300mg per day ie: 3-6oz cups coffee     Work up to 45-60 minutes of physical activity most days of the week   Helps with losing weight and prevent regaining those extra pounds!    Do a combo of cardio (walking/water exercises) and strength training (lifting weights/Vinyasa yoga)    Avoid Mindless Eating   Be present when you eat--take note of the smell, taste and quality of your food   Make a list of alternative activities you could do to prevent eating out of boredom/stress  Go for a walk, call a friend, chew gum, paint your nails, re-organize the garage, etc

## 2024-05-25 ENCOUNTER — HEALTH MAINTENANCE LETTER (OUTPATIENT)
Age: 59
End: 2024-05-25

## 2024-05-30 DIAGNOSIS — J45.40 MODERATE PERSISTENT ASTHMA WITHOUT COMPLICATION: Chronic | ICD-10-CM

## 2024-05-30 DIAGNOSIS — N52.9 ERECTILE DYSFUNCTION, UNSPECIFIED ERECTILE DYSFUNCTION TYPE: Chronic | ICD-10-CM

## 2024-05-30 DIAGNOSIS — E78.5 HYPERLIPIDEMIA LDL GOAL <130: Chronic | ICD-10-CM

## 2024-05-30 RX ORDER — MONTELUKAST SODIUM 10 MG/1
TABLET ORAL
Qty: 90 TABLET | Refills: 0 | Status: SHIPPED | OUTPATIENT
Start: 2024-05-30 | End: 2024-08-15

## 2024-05-30 RX ORDER — ATORVASTATIN CALCIUM 80 MG/1
80 TABLET, FILM COATED ORAL DAILY
Qty: 90 TABLET | Refills: 0 | Status: SHIPPED | OUTPATIENT
Start: 2024-05-30 | End: 2024-08-13

## 2024-05-30 RX ORDER — TADALAFIL 5 MG/1
TABLET ORAL
Qty: 90 TABLET | Refills: 0 | Status: SHIPPED | OUTPATIENT
Start: 2024-05-30 | End: 2024-08-19

## 2024-06-11 DIAGNOSIS — G25.81 RESTLESS LEGS SYNDROME (RLS): ICD-10-CM

## 2024-06-12 RX ORDER — ROPINIROLE 2 MG/1
2 TABLET, FILM COATED ORAL
Qty: 90 TABLET | Refills: 0 | Status: SHIPPED | OUTPATIENT
Start: 2024-06-12 | End: 2024-07-31

## 2024-06-14 DIAGNOSIS — K21.9 GASTROESOPHAGEAL REFLUX DISEASE WITHOUT ESOPHAGITIS: ICD-10-CM

## 2024-06-14 RX ORDER — FAMOTIDINE 40 MG/1
TABLET, FILM COATED ORAL
Qty: 30 TABLET | Refills: 0 | Status: SHIPPED | OUTPATIENT
Start: 2024-06-14 | End: 2024-07-17

## 2024-07-05 ENCOUNTER — TELEPHONE (OUTPATIENT)
Dept: FAMILY MEDICINE | Facility: CLINIC | Age: 59
End: 2024-07-05
Payer: COMMERCIAL

## 2024-07-05 DIAGNOSIS — G25.81 RESTLESS LEGS SYNDROME (RLS): Primary | ICD-10-CM

## 2024-07-05 RX ORDER — ROPINIROLE 2 MG/1
2 TABLET, FILM COATED ORAL AT BEDTIME
Qty: 2 TABLET | Refills: 0 | Status: SHIPPED | OUTPATIENT
Start: 2024-07-05 | End: 2024-08-13

## 2024-07-15 ENCOUNTER — PATIENT OUTREACH (OUTPATIENT)
Dept: CARE COORDINATION | Facility: CLINIC | Age: 59
End: 2024-07-15
Payer: COMMERCIAL

## 2024-07-17 DIAGNOSIS — K21.9 GASTROESOPHAGEAL REFLUX DISEASE WITHOUT ESOPHAGITIS: ICD-10-CM

## 2024-07-17 RX ORDER — FAMOTIDINE 40 MG/1
TABLET, FILM COATED ORAL
Qty: 90 TABLET | Refills: 1 | Status: SHIPPED | OUTPATIENT
Start: 2024-07-17

## 2024-07-28 DIAGNOSIS — G25.81 RESTLESS LEGS SYNDROME (RLS): ICD-10-CM

## 2024-07-29 RX ORDER — ROPINIROLE 2 MG/1
2 TABLET, FILM COATED ORAL
Qty: 90 TABLET | Refills: 0 | OUTPATIENT
Start: 2024-07-29

## 2024-07-30 NOTE — TELEPHONE ENCOUNTER
Patient checking status of his refill for rOPINIRole (REQUIP) 2 MG tablet .   He is out of medication from 6/12 refill because he takes 4MG (2 tablets) . He mentioned this to Dr Porter in a [employer] message on 7/5.   Can he get this refilled ASAP?

## 2024-07-31 ENCOUNTER — TRANSFERRED RECORDS (OUTPATIENT)
Dept: HEALTH INFORMATION MANAGEMENT | Facility: CLINIC | Age: 59
End: 2024-07-31
Payer: COMMERCIAL

## 2024-07-31 RX ORDER — ROPINIROLE 2 MG/1
4 TABLET, FILM COATED ORAL AT BEDTIME
Qty: 60 TABLET | Refills: 0 | Status: SHIPPED | OUTPATIENT
Start: 2024-07-31 | End: 2024-08-19

## 2024-08-12 DIAGNOSIS — J31.0 PURULENT RHINITIS: ICD-10-CM

## 2024-08-12 DIAGNOSIS — J34.89 NASAL SEPTAL PERFORATION: ICD-10-CM

## 2024-08-13 ENCOUNTER — OFFICE VISIT (OUTPATIENT)
Dept: FAMILY MEDICINE | Facility: CLINIC | Age: 59
End: 2024-08-13
Payer: COMMERCIAL

## 2024-08-13 VITALS
BODY MASS INDEX: 29.33 KG/M2 | HEART RATE: 78 BPM | RESPIRATION RATE: 11 BRPM | OXYGEN SATURATION: 95 % | DIASTOLIC BLOOD PRESSURE: 76 MMHG | WEIGHT: 198 LBS | SYSTOLIC BLOOD PRESSURE: 127 MMHG | TEMPERATURE: 98.5 F | HEIGHT: 69 IN

## 2024-08-13 DIAGNOSIS — J34.89 NASAL SEPTAL PERFORATION: ICD-10-CM

## 2024-08-13 DIAGNOSIS — K21.9 GASTROESOPHAGEAL REFLUX DISEASE WITHOUT ESOPHAGITIS: ICD-10-CM

## 2024-08-13 DIAGNOSIS — E66.09 CLASS 1 OBESITY DUE TO EXCESS CALORIES WITH SERIOUS COMORBIDITY AND BODY MASS INDEX (BMI) OF 30.0 TO 30.9 IN ADULT: ICD-10-CM

## 2024-08-13 DIAGNOSIS — Z12.5 PROSTATE CANCER SCREENING: ICD-10-CM

## 2024-08-13 DIAGNOSIS — E66.811 CLASS 1 OBESITY DUE TO EXCESS CALORIES WITH SERIOUS COMORBIDITY AND BODY MASS INDEX (BMI) OF 30.0 TO 30.9 IN ADULT: ICD-10-CM

## 2024-08-13 DIAGNOSIS — M75.42 IMPINGEMENT SYNDROME OF SHOULDER REGION, LEFT: ICD-10-CM

## 2024-08-13 DIAGNOSIS — G25.81 RESTLESS LEGS SYNDROME (RLS): ICD-10-CM

## 2024-08-13 DIAGNOSIS — J31.0 PURULENT RHINITIS: ICD-10-CM

## 2024-08-13 DIAGNOSIS — M75.122 NONTRAUMATIC COMPLETE TEAR OF LEFT ROTATOR CUFF: ICD-10-CM

## 2024-08-13 DIAGNOSIS — E29.1 MALE HYPOGONADISM: Chronic | ICD-10-CM

## 2024-08-13 DIAGNOSIS — E78.5 HYPERLIPIDEMIA LDL GOAL <130: ICD-10-CM

## 2024-08-13 DIAGNOSIS — Z01.818 PREOP GENERAL PHYSICAL EXAM: Primary | ICD-10-CM

## 2024-08-13 DIAGNOSIS — J45.40 MODERATE PERSISTENT ASTHMA WITHOUT COMPLICATION: Chronic | ICD-10-CM

## 2024-08-13 DIAGNOSIS — G47.33 OSA (OBSTRUCTIVE SLEEP APNEA): Chronic | ICD-10-CM

## 2024-08-13 LAB
ALBUMIN SERPL BCG-MCNC: 4.4 G/DL (ref 3.5–5.2)
ALP SERPL-CCNC: 98 U/L (ref 40–150)
ALT SERPL W P-5'-P-CCNC: 25 U/L (ref 0–70)
ANION GAP SERPL CALCULATED.3IONS-SCNC: 8 MMOL/L (ref 7–15)
AST SERPL W P-5'-P-CCNC: 22 U/L (ref 0–45)
BILIRUB SERPL-MCNC: 0.4 MG/DL
BUN SERPL-MCNC: 11.9 MG/DL (ref 6–20)
CALCIUM SERPL-MCNC: 9.4 MG/DL (ref 8.8–10.4)
CHLORIDE SERPL-SCNC: 103 MMOL/L (ref 98–107)
CHOLEST SERPL-MCNC: 131 MG/DL
CREAT SERPL-MCNC: 1.07 MG/DL (ref 0.67–1.17)
EGFRCR SERPLBLD CKD-EPI 2021: 80 ML/MIN/1.73M2
FASTING STATUS PATIENT QL REPORTED: NORMAL
FASTING STATUS PATIENT QL REPORTED: NORMAL
GLUCOSE SERPL-MCNC: 86 MG/DL (ref 70–99)
HCO3 SERPL-SCNC: 26 MMOL/L (ref 22–29)
HDLC SERPL-MCNC: 67 MG/DL
LDLC SERPL CALC-MCNC: 53 MG/DL
NONHDLC SERPL-MCNC: 64 MG/DL
POTASSIUM SERPL-SCNC: 4.2 MMOL/L (ref 3.4–5.3)
PROT SERPL-MCNC: 7 G/DL (ref 6.4–8.3)
PSA SERPL DL<=0.01 NG/ML-MCNC: 2.45 NG/ML (ref 0–3.5)
SODIUM SERPL-SCNC: 137 MMOL/L (ref 135–145)
TRIGL SERPL-MCNC: 54 MG/DL

## 2024-08-13 PROCEDURE — 36415 COLL VENOUS BLD VENIPUNCTURE: CPT | Performed by: FAMILY MEDICINE

## 2024-08-13 PROCEDURE — 99214 OFFICE O/P EST MOD 30 MIN: CPT | Performed by: FAMILY MEDICINE

## 2024-08-13 PROCEDURE — 80053 COMPREHEN METABOLIC PANEL: CPT | Performed by: FAMILY MEDICINE

## 2024-08-13 PROCEDURE — G2211 COMPLEX E/M VISIT ADD ON: HCPCS | Performed by: FAMILY MEDICINE

## 2024-08-13 PROCEDURE — 80061 LIPID PANEL: CPT | Performed by: FAMILY MEDICINE

## 2024-08-13 PROCEDURE — G0103 PSA SCREENING: HCPCS | Performed by: FAMILY MEDICINE

## 2024-08-13 RX ORDER — GABAPENTIN 100 MG/1
CAPSULE ORAL
Qty: 90 CAPSULE | Refills: 0 | Status: SHIPPED | OUTPATIENT
Start: 2024-08-13 | End: 2025-08-20

## 2024-08-13 RX ORDER — ATORVASTATIN CALCIUM 80 MG/1
80 TABLET, FILM COATED ORAL DAILY
Qty: 90 TABLET | Refills: 1 | Status: SHIPPED | OUTPATIENT
Start: 2024-08-13

## 2024-08-13 ASSESSMENT — ASTHMA QUESTIONNAIRES
QUESTION_1 LAST FOUR WEEKS HOW MUCH OF THE TIME DID YOUR ASTHMA KEEP YOU FROM GETTING AS MUCH DONE AT WORK, SCHOOL OR AT HOME: NONE OF THE TIME
ACT_TOTALSCORE: 25
QUESTION_2 LAST FOUR WEEKS HOW OFTEN HAVE YOU HAD SHORTNESS OF BREATH: NOT AT ALL
QUESTION_3 LAST FOUR WEEKS HOW OFTEN DID YOUR ASTHMA SYMPTOMS (WHEEZING, COUGHING, SHORTNESS OF BREATH, CHEST TIGHTNESS OR PAIN) WAKE YOU UP AT NIGHT OR EARLIER THAN USUAL IN THE MORNING: NOT AT ALL
QUESTION_4 LAST FOUR WEEKS HOW OFTEN HAVE YOU USED YOUR RESCUE INHALER OR NEBULIZER MEDICATION (SUCH AS ALBUTEROL): NOT AT ALL
ACT_TOTALSCORE: 25
QUESTION_5 LAST FOUR WEEKS HOW WOULD YOU RATE YOUR ASTHMA CONTROL: COMPLETELY CONTROLLED

## 2024-08-13 ASSESSMENT — PAIN SCALES - GENERAL: PAINLEVEL: MODERATE PAIN (4)

## 2024-08-13 NOTE — PATIENT INSTRUCTIONS

## 2024-08-13 NOTE — LETTER
August 15, 2024      STEPHANI Treadwell  [address]  [facility] [address]        Marco A Martinez, all that lab work looks great.  Very happy to see that.  Get this shoulder fixed and heal up quickly.     Sincerely,    JENNI Porter M.D.   Resulted Orders   Lipid panel reflex to direct LDL Non-fasting   Result Value Ref Range    Cholesterol 131 <200 mg/dL    Triglycerides 54 <150 mg/dL    Direct Measure HDL 67 >=40 mg/dL    LDL Cholesterol Calculated 53 <=100 mg/dL    Non HDL Cholesterol 64 <130 mg/dL    Patient Fasting > 8hrs? Unknown     Narrative    Cholesterol  Desirable:  <200 mg/dL    Triglycerides  Normal:  Less than 150 mg/dL  Borderline High:  150-199 mg/dL  High:  200-499 mg/dL  Very High:  Greater than or equal to 500 mg/dL    Direct Measure HDL  Female:  Greater than or equal to 50 mg/dL   Male:  Greater than or equal to 40 mg/dL    LDL Cholesterol  Desirable:  <100mg/dL  Above Desirable:  100-129 mg/dL   Borderline High:  130-159 mg/dL   High:  160-189 mg/dL   Very High:  >= 190 mg/dL    Non HDL Cholesterol  Desirable:  130 mg/dL  Above Desirable:  130-159 mg/dL  Borderline High:  160-189 mg/dL  High:  190-219 mg/dL  Very High:  Greater than or equal to 220 mg/dL   Comprehensive metabolic panel   Result Value Ref Range    Sodium 137 135 - 145 mmol/L    Potassium 4.2 3.4 - 5.3 mmol/L    Carbon Dioxide (CO2) 26 22 - 29 mmol/L    Anion Gap 8 7 - 15 mmol/L    Urea Nitrogen 11.9 6.0 - 20.0 mg/dL    Creatinine 1.07 0.67 - 1.17 mg/dL    GFR Estimate 80 >60 mL/min/1.73m2      Comment:      eGFR calculated using 2021 CKD-EPI equation.    Calcium 9.4 8.8 - 10.4 mg/dL      Comment:      Reference intervals for this test were updated on 7/16/2024 to reflect our healthy population more accurately. There may be differences in the flagging of prior results with similar values performed with this method. Those prior results can be interpreted in the context of the updated reference intervals.    Chloride 103 98 - 107  mmol/L    Glucose 86 70 - 99 mg/dL    Alkaline Phosphatase 98 40 - 150 U/L    AST 22 0 - 45 U/L    ALT 25 0 - 70 U/L    Protein Total 7.0 6.4 - 8.3 g/dL    Albumin 4.4 3.5 - 5.2 g/dL    Bilirubin Total 0.4 <=1.2 mg/dL    Patient Fasting > 8hrs? Unknown    Prostate Specific Antigen Screen   Result Value Ref Range    Prostate Specific Antigen Screen 2.45 0.00 - 3.50 ng/mL    Narrative    This result is obtained using the Roche Elecsys total PSA method on the carloz [id number] immunoassay analyzer. Results obtained with different assay methods or kits cannot be used interchangeably.       If you have any questions or concerns, please call the clinic at the number listed above.

## 2024-08-13 NOTE — PROGRESS NOTES
Preoperative Evaluation  [address] [address]  Phone: [phone]  Primary Provider: Kelli Porter MD  Pre-op Performing Provider: Kelli Porter MD  Aug 13, 2024             8/13/2024   Surgical Information   What procedure is being done? Left shoulder   Facility or Hospital where procedure/surgery will be performed: [facility]   Who is doing the procedure / surgery? Mendezrefugio Reeves   Date of surgery / procedure: 8/29/24   Time of surgery / procedure: TBD   Where do you plan to recover after surgery? at home with family        Fax number for surgical facility: [phone]      Assessment & Plan     The proposed surgical procedure is considered INTERMEDIATE risk.    Preop general physical exam    Impingement syndrome of shoulder region, left  Plan decompression and rotator cuff repair    Nontraumatic complete tear of left rotator cuff    Hyperlipidemia LDL goal <130    Class 1 obesity due to excess calories with serious comorbidity and body mass index (BMI) of 30.0 to 30.9 in adult  On Wegovy and doing well    Gastroesophageal reflux disease without esophagitis    Moderate persistent asthma without complication    DIAMOND (obstructive sleep apnea)- moderate (AHI 17)    Male hypogonadism        - No identified additional risk factors other than previously addressed    Preoperative Medication Instructions  Antiplatelet or Anticoagulation Medication Instructions   - Patient is on no antiplatelet or anticoagulation medications.    Additional Medication Instructions  Take all scheduled medications on the day of surgery EXCEPT for modifications listed below:   - GLP-1 Injectable (exenitide, liraglutide, semaglutide, dulaglutide, etc.): DO NOT TAKE 7 days before surgery     Recommendation  Approval given to proceed with proposed procedure, without further diagnostic evaluation.    Sajan STYLES is a 59 year old, presenting for the  following:  Pre-Op Exam (Pre-op DOS 08/29/24)          8/13/2024     2:28 PM   Additional Questions   Roomed by Vaishnavi TY   Accompanied by Self         8/13/2024     2:28 PM   Patient Reported Additional Medications   Patient reports taking the following new medications None     HPI related to upcoming procedure:   Left shoulder rotator cuff tear.  Planned decompression and repair.        8/13/2024   Pre-Op Questionnaire   Have you ever had a heart attack or stroke? No   Have you ever had surgery on your heart or blood vessels, such as a stent placement, a coronary artery bypass, or surgery on an artery in your head, neck, heart, or legs? No   Do you have chest pain with activity? No   Do you have a history of heart failure? No   Do you currently have a cold, bronchitis or symptoms of other infection? No   Do you have a cough, shortness of breath, or wheezing? No   Do you or anyone in your family have previous history of blood clots? No   Do you or does anyone in your family have a serious bleeding problem such as prolonged bleeding following surgeries or cuts? No   Have you ever had problems with anemia or been told to take iron pills? No   Have you had any abnormal blood loss such as black, tarry or bloody stools? No   Have you ever had a blood transfusion? No   Are you willing to have a blood transfusion if it is medically needed before, during, or after your surgery? Yes   Have you or any of your relatives ever had problems with anesthesia? No   Do you have sleep apnea, excessive snoring or daytime drowsiness? (!) YES   Do you have a CPAP machine? Yes   Do you have any artifical heart valves or other implanted medical devices like a pacemaker, defibrillator, or continuous glucose monitor? No   Do you have artificial joints? No   Are you allergic to latex? No        Health Care Directive  Patient does not have a Health Care Directive or Living Will:     Preoperative Review of    reviewed - no record of  controlled substances prescribed.      Status of Chronic Conditions:  See problem list for active medical problems.  Problems all longstanding and stable, except as noted/documented.  See ROS for pertinent symptoms related to these conditions.    HYPERLIPIDEMIA - Patient has a long history of significant Hyperlipidemia requiring medication for treatment with recent good control. Patient reports no problems or side effects with the medication.     Patient Active Problem List    Diagnosis Date Noted    Nontraumatic complete tear of left rotator cuff 08/13/2024     Priority: Medium    Class 1 obesity due to excess calories with serious comorbidity and body mass index (BMI) of 30.0 to 30.9 in adult 08/03/2022     Priority: Medium    Nasal septal perforation 10/28/2021     Priority: Medium    Impingement syndrome of shoulder region, left 11/10/2020     Priority: Medium    Moderate persistent asthma without complication 01/14/2016     Priority: Medium    Restless legs syndrome (RLS) 12/02/2015     Priority: Medium    Gastroesophageal reflux disease without esophagitis      Priority: Medium    Erectile dysfunction, unspecified erectile dysfunction type 11/16/2015     Priority: Medium    DIAMOND (obstructive sleep apnea)- moderate (AHI 17) 11/06/2015     Priority: Medium     ve      Hyperlipidemia LDL goal <130 11/06/2015     Priority: Medium    Allergic rhinitis, unspecified allergic rhinitis type 11/06/2015     Priority: Medium    Male hypogonadism 11/06/2015     Priority: Medium      Past Medical History:   Diagnosis Date    Anal fistula     Impingement syndrome of left shoulder 11/10/2020    Kidney stones 2016 2016 One episode    Low back pain 08/23/2017    Herniated disk    Uncomplicated asthma      Past Surgical History:   Procedure Laterality Date    COLONOSCOPY N/A 12/16/2014    Procedure: COLONOSCOPY;  Surgeon: Raz Ortiz MD;  Location: [facility] OR    EXAM UNDER ANESTHESIA, FISTULOTOMY RECTUM, COMBINED N/A  12/16/2014    Procedure: COMBINED EXAM UNDER ANESTHESIA, FISTULOTOMY RECTUM;  Surgeon: Raz Ortiz MD;  [facility]       Current Outpatient Medications   Medication Sig Dispense Refill    atorvastatin (LIPITOR) 80 MG tablet TAKE ONE TABLET BY MOUTH ONCE DAILY 90 tablet 0    Cholecalciferol (VITAMIN D) 2000 UNITS tablet Take 2,000 Units by mouth      COMPOUNDED NON-CONTROLLED SUBSTANCE (CMPD RX) - PHARMACY TO MIX COMPOUNDED MEDICATION Apply 20 mLs into each nare daily Mupirocin 22grams/liter saline 1000 mL 6    famotidine (PEPCID) 40 MG tablet TAKE ONE TABLET BY MOUTH ONCE DAILY 90 tablet 1    montelukast (SINGULAIR) 10 MG tablet TAKE ONE TABLET BY MOUTH AT BEDTIME 90 tablet 0    Multiple Vitamins-Minerals (MULTIVITAMIN OR)       order for DME Resmed Airsense 10 auto cpap 10-14 cm, Mirage Fx nasal mask std      rOPINIRole (REQUIP) 2 MG tablet Take 2 tablets (4 mg) by mouth at bedtime 60 tablet 0    rOPINIRole (REQUIP) 2 MG tablet Take 1 tablet (2 mg) by mouth at bedtime 2 tablet 0    Semaglutide-Weight Management (WEGOVY) 1.7 MG/0.75ML pen INJECT 1.7MG UNDER THE SKIN EVERY 7 DAYS 9 mL 1    tadalafil (CIALIS) 5 MG tablet TAKE ONE TABLET BY MOUTH ONCE DAILY 90 tablet 0       Allergies   Allergen Reactions    Codeine Nausea and Vomiting        Social History     Tobacco Use    Smoking status: Never     Passive exposure: Never    Smokeless tobacco: Never    Tobacco comments:     no 2nd hand smoke exposure   Substance Use Topics    Alcohol use: Yes     Alcohol/week: 3.0 - 4.0 standard drinks of alcohol     Comment: 3-4 drinks weekly     Family History   Problem Relation Age of Onset    Crohn's Disease Brother     Depression Brother     Prostate Cancer Father     Diabetes Father 80    Breast Cancer Sister     Breast Cancer Mother     Thyroid Disease Brother     Anesthesia Reaction No family hx of     Colon Polyps No family hx of     Ulcerative Colitis No family hx of      "Cancer - colorectal No family hx of     Coronary Artery Disease No family hx of      History   Drug Use Unknown             Review of Systems  CONSTITUTIONAL: NEGATIVE for fever, chills, change in weight  INTEGUMENTARY/SKIN: NEGATIVE for worrisome rashes, moles or lesions  EYES: NEGATIVE for vision changes or irritation  ENT/MOUTH: NEGATIVE for ear, mouth and throat problems  RESP: NEGATIVE for significant cough or SOB  BREAST: NEGATIVE for masses, tenderness or discharge  CV: NEGATIVE for chest pain, palpitations or peripheral edema  GI: NEGATIVE for nausea, abdominal pain, heartburn, or change in bowel habits  : NEGATIVE for frequency, dysuria, or hematuria  MUSCULOSKELETAL: NEGATIVE for significant arthralgias or myalgia  NEURO: NEGATIVE for weakness, dizziness or paresthesias  ENDOCRINE: NEGATIVE for temperature intolerance, skin/hair changes  HEME: NEGATIVE for bleeding problems  PSYCHIATRIC: NEGATIVE for changes in mood or affect    Objective    /76 (BP Location: Right arm, Patient Position: Sitting, Cuff Size: Adult Regular)   Pulse 78   Temp 98.5  F (36.9  C) (Temporal)   Resp 11   Ht 1.753 m (5' 9\")   Wt 89.8 kg (198 lb)   SpO2 95%   BMI 29.24 kg/m     Estimated body mass index is 29.24 kg/m  as calculated from the following:    Height as of this encounter: 1.753 m (5' 9\").    Weight as of this encounter: 89.8 kg (198 lb).  Physical Exam  GENERAL: alert and no distress  EYES: Eyes grossly normal to inspection, PERRL and conjunctivae and sclerae normal  HENT: ear canals and TM's normal, nose and mouth without ulcers or lesions  NECK: no adenopathy, no asymmetry, masses, or scars  RESP: lungs clear to auscultation - no rales, rhonchi or wheezes  CV: regular rate and rhythm, normal S1 S2, no S3 or S4, no murmur, click or rub, no peripheral edema  ABDOMEN: soft, nontender, no hepatosplenomegaly, no masses and bowel sounds normal  MS: no gross musculoskeletal defects noted, no edema  SKIN: no " suspicious lesions or rashes  NEURO: Normal strength and tone, mentation intact and speech normal  PSYCH: mentation appears normal, affect normal/bright    Recent Labs   Lab Test 12/21/23  1556 08/14/23  1538    139   POTASSIUM 4.7 4.7   CR 1.03 1.03        Diagnostics  CMP pending    No EKG indicated    Revised Cardiac Risk Index (RCRI)  The patient has the following serious cardiovascular risks for perioperative complications:   - No serious cardiac risks = 0 points     RCRI Interpretation: 0 points: Class I (very low risk - 0.4% complication rate)         Signed Electronically by: Kelli Porter MD  A copy of this evaluation report is provided to the requesting physician.

## 2024-08-13 NOTE — TELEPHONE ENCOUNTER
Signed Prescriptions:                        Disp   Refills    COMPOUNDED NON-CONTROLLED SUBSTANCE (CMPD *1000 mL6        Sig: Apply 20 mLs into each nare daily Mupirocin           22grams/liter saline  Authorizing Provider: ELIF ADAMES  Ordering User: PIERRE SALMERON RN Care Coordinator, [facility] 08/13/24 11:37 AM

## 2024-08-14 NOTE — TELEPHONE ENCOUNTER
Duplicate encounter.    Dominique Gonzalez RN Care Coordinator, [facility] 08/14/24 12:28 PM

## 2024-08-15 DIAGNOSIS — J45.40 MODERATE PERSISTENT ASTHMA WITHOUT COMPLICATION: Chronic | ICD-10-CM

## 2024-08-15 RX ORDER — MONTELUKAST SODIUM 10 MG/1
TABLET ORAL
Qty: 90 TABLET | Refills: 0 | Status: SHIPPED | OUTPATIENT
Start: 2024-08-15

## 2024-08-15 NOTE — RESULT ENCOUNTER NOTE
Marco A Martinez, all that lab work looks great.  Very happy to see that.  Get this shoulder fixed and heal up quickly.  JENNI Porter M.D.

## 2024-08-18 DIAGNOSIS — N52.9 ERECTILE DYSFUNCTION, UNSPECIFIED ERECTILE DYSFUNCTION TYPE: Chronic | ICD-10-CM

## 2024-08-18 DIAGNOSIS — G25.81 RESTLESS LEGS SYNDROME (RLS): ICD-10-CM

## 2024-08-19 RX ORDER — ROPINIROLE 2 MG/1
4 TABLET, FILM COATED ORAL AT BEDTIME
Qty: 60 TABLET | Refills: 0 | Status: SHIPPED | OUTPATIENT
Start: 2024-08-19 | End: 2024-09-18

## 2024-08-19 RX ORDER — TADALAFIL 5 MG/1
TABLET ORAL
Qty: 90 TABLET | Refills: 0 | Status: SHIPPED | OUTPATIENT
Start: 2024-08-19

## 2024-09-18 DIAGNOSIS — G25.81 RESTLESS LEGS SYNDROME (RLS): ICD-10-CM

## 2024-09-18 RX ORDER — ROPINIROLE 2 MG/1
4 TABLET, FILM COATED ORAL AT BEDTIME
Qty: 60 TABLET | Refills: 1 | Status: SHIPPED | OUTPATIENT
Start: 2024-09-18

## 2024-10-04 ENCOUNTER — PATIENT OUTREACH (OUTPATIENT)
Dept: CARE COORDINATION | Facility: CLINIC | Age: 59
End: 2024-10-04
Payer: COMMERCIAL

## 2024-10-10 DIAGNOSIS — G25.81 RESTLESS LEGS SYNDROME (RLS): ICD-10-CM

## 2024-10-10 DIAGNOSIS — G47.33 OBSTRUCTIVE SLEEP APNEA (ADULT) (PEDIATRIC): Primary | ICD-10-CM

## 2024-10-10 DIAGNOSIS — E66.09 CLASS 1 OBESITY DUE TO EXCESS CALORIES WITH SERIOUS COMORBIDITY AND BODY MASS INDEX (BMI) OF 30.0 TO 30.9 IN ADULT: ICD-10-CM

## 2024-10-10 DIAGNOSIS — E66.811 CLASS 1 OBESITY DUE TO EXCESS CALORIES WITH SERIOUS COMORBIDITY AND BODY MASS INDEX (BMI) OF 30.0 TO 30.9 IN ADULT: ICD-10-CM

## 2024-10-14 ENCOUNTER — TELEPHONE (OUTPATIENT)
Dept: SURGERY | Facility: CLINIC | Age: 59
End: 2024-10-14
Payer: COMMERCIAL

## 2024-10-14 DIAGNOSIS — K21.9 GASTROESOPHAGEAL REFLUX DISEASE WITHOUT ESOPHAGITIS: ICD-10-CM

## 2024-10-14 DIAGNOSIS — E66.09 CLASS 1 OBESITY DUE TO EXCESS CALORIES WITH SERIOUS COMORBIDITY AND BODY MASS INDEX (BMI) OF 30.0 TO 30.9 IN ADULT: ICD-10-CM

## 2024-10-14 DIAGNOSIS — E66.811 CLASS 1 OBESITY DUE TO EXCESS CALORIES WITH SERIOUS COMORBIDITY AND BODY MASS INDEX (BMI) OF 30.0 TO 30.9 IN ADULT: ICD-10-CM

## 2024-10-14 NOTE — TELEPHONE ENCOUNTER
"Prior Authorization Retail Medication Request    Medication/Dose: Wegovy 1.7 MG    ICD code (if different than what is on RX):  [E66.09, Z68.30 [K21.9]     Previously Tried and Failed:  Diet and lifestyle changes    Rationale:  Weight Management    Hx of obesity   Ht: 5' 9\"  Body mass index is 28.8 kg/m .   Current Weight:  195 lb (88.45 kg)   Last Visits Weight: 204 lb (92.53 kg)  Initial Weight (lbs): 227 lbs Date: 6/23/22  Initial BMI: 33.52 Date: 6/23/22  Cumulative weight loss (lbs): 32  Weight Loss Percentage: 14.09%    Insurance Name:      [facility]     Insurance ID:  [id number]       Pharmacy Information (if different than what is on RX)  Name:    [id number] - [place] - [address]     Phone:  [phone]    "

## 2024-10-14 NOTE — TELEPHONE ENCOUNTER
Medication Question or Refill        What medication are you calling about (include dose and sig)?: Semaglutide-Weight Management (WEGOVY) 1.7 MG/0.75ML pen     Preferred Pharmacy:       [id number] - [place], [place] - [address]  [address] [address]  Phone: [phone] Fax: [phone]          Controlled Substance Agreement on file:   CSA -- Patient Level:    CSA: None found at the patient level.       Who prescribed the medication?: Micha Arroyo    Do you need a refill? No    When did you use the medication last? 10/09/2024    Patient offered an appointment? No    Do you have any questions or concerns?  Yes: patient needs new prior auth      Could we send this information to you in [employer] or would you prefer to receive a phone call?:   Patient would like to be contacted via [employer]

## 2024-10-16 NOTE — TELEPHONE ENCOUNTER
Retail Pharmacy Prior Authorization Team   Phone: [phone]    Prior Authorization Approval    Medication: WEGOVY 1.7 MG/0.75ML SC SOAJ  Authorization Effective Date: 9/16/2024  Authorization Expiration Date: 10/16/2025  Insurance Company: [employer] - Phone [phone] Fax [phone]  Which Pharmacy is filling the prescription: [id number] [address]  Pharmacy Notified: yes  Patient Notified: yes **Instructed pharmacy to notify patient when script is ready to /ship.**

## 2024-10-16 NOTE — TELEPHONE ENCOUNTER
Retail Pharmacy Prior Authorization Team   Phone: [phone]    PA Initiation    Medication: WEGOVY 1.7 MG/0.75ML SC SOAJ  Insurance Company: [employer] - Phone [phone] Fax [phone]  Pharmacy Filling the Rx: [id number] - [place], [place] - [address]  Filling Pharmacy Phone: [phone]  Filling Pharmacy Fax:    Start Date: 10/16/2024

## 2024-10-17 RX ORDER — SEMAGLUTIDE 1.7 MG/.75ML
1.7 INJECTION, SOLUTION SUBCUTANEOUS WEEKLY
Qty: 3 ML | Refills: 0 | Status: SHIPPED | OUTPATIENT
Start: 2024-10-17

## 2024-11-06 ENCOUNTER — OFFICE VISIT (OUTPATIENT)
Dept: SURGERY | Facility: CLINIC | Age: 59
End: 2024-11-06
Payer: COMMERCIAL

## 2024-11-06 VITALS
HEIGHT: 69 IN | DIASTOLIC BLOOD PRESSURE: 86 MMHG | BODY MASS INDEX: 29.51 KG/M2 | HEART RATE: 71 BPM | SYSTOLIC BLOOD PRESSURE: 126 MMHG | WEIGHT: 199.2 LBS

## 2024-11-06 DIAGNOSIS — Z86.39 HISTORY OF OBESITY: Primary | ICD-10-CM

## 2024-11-06 DIAGNOSIS — G47.33 OSA (OBSTRUCTIVE SLEEP APNEA): Chronic | ICD-10-CM

## 2024-11-06 DIAGNOSIS — E66.3 OVERWEIGHT WITH BODY MASS INDEX (BMI) OF 29 TO 29.9 IN ADULT: ICD-10-CM

## 2024-11-06 PROCEDURE — 99213 OFFICE O/P EST LOW 20 MIN: CPT | Performed by: PHYSICIAN ASSISTANT

## 2024-11-06 PROCEDURE — G2211 COMPLEX E/M VISIT ADD ON: HCPCS | Performed by: PHYSICIAN ASSISTANT

## 2024-11-06 RX ORDER — SEMAGLUTIDE 1.7 MG/.75ML
1.7 INJECTION, SOLUTION SUBCUTANEOUS
Qty: 9 ML | Refills: 1 | Status: SHIPPED | OUTPATIENT
Start: 2024-11-06

## 2024-11-06 NOTE — PATIENT INSTRUCTIONS
"Nice to talk with you today! Thank you for allowing me the privilege of caring for you.   We hope we provided you with the excellent service you deserve.     To ensure the quality of our services you may receive a patient satisfaction survey from an independent monitoring company.  The greatest compliment you can give is \"Likely to Recommend\"      Below is our plan we discussed.-  MUNDO Muse      Plan:  Continue wegovy 1.7 mg - 6 month prescription sent over  Continue increasing activity per orthopedics guidelines    Please schedule a follow up with Micha Arroyo PA-C in 6 months.  If you need to reach me sooner you can do so by calling [phone].    Have a great day!       Eat Better ? Move More ? Live Well    Eat 3 nutrient-rich meals each day    Don t skip meals--it will cause you to overeat later in the day!    Eating fiber (vegetables/fruits/whole grains) and protein with meals helps you stay full longer    Choose foods with less than 10 grams of sugar and 5 grams of fat per serving to prevent excess calories and weight gain  Eat around the same times each day to develop a routine eating schedule   Avoid snacking unless physically hungry.   Planned snacks: 1-2 times per day and no more than 150 calories    Eat protein first   Protein helps with healing, maintaining adequate muscle mass, reducing hunger and optimizing nutritional status   Aim for 60-80 grams of protein per day   Fill up on Fiber   Fiber comes from plants--fruits, veggies, whole grains, nuts/seeds and beans   Fiber is low in calories, high in phytonutrients and helps you stay full longer   Aim for 25-35 grams per day by eating fiber with meals and snacks  Eat S-L-O-W-L-Y   Take 20-30 minutes to eat each meal by taking small bites, chewing foods to applesauce consistency or 20-30 times before you swallow   Eating foods too fast can delay satiety/fullness signals and increase overeating   Slow down your eating by using toddler utensils, putting " your fork/spoon down between bites and not watching TV or emailing during meals!   Keep a Journal         Writing down what you eat, how you feel and when you are active helps you identify new changes to work on from week to week         Look for ways to cut 100 calories from your current diet 2-3 times per day  Drink 64 ounces of 0-Calorie drinks between meals   Water   Zero calorie Propel  or Vitamin Water     SoBe Lifewater  Zero Calories   Crystal Light , Sugar-Free Joselito-Aid , and other sugar-free lemonade or flavored coleman   Keep Caffeine to less than 300mg per day ie: 3-6oz cups coffee     Work up to 45-60 minutes of physical activity most days of the week   Helps with losing weight and prevent regaining those extra pounds!    Do a combo of cardio (walking/water exercises) and strength training (lifting weights/Vinyasa yoga)    Avoid Mindless Eating   Be present when you eat--take note of the smell, taste and quality of your food   Make a list of alternative activities you could do to prevent eating out of boredom/stress  Go for a walk, call a friend, chew gum, paint your nails, re-organize the garage, etc

## 2024-11-06 NOTE — PROGRESS NOTES
Return Medical Weight Management Note         Juan Treadwlel  MRN:  [id number]  :  1965  MAURICIO:  2024        Dear Kelli Porter MD,    I had the pleasure of seeing your patient Juan Treadwell. He is a 59 year old male who I am continuing to see for treatment of obesity related to:        2022     2:11 PM   --   I have the following health issues associated with obesity High Cholesterol    Sleep Apnea    GERD (Reflux)   I have the following symptoms associated with obesity Knee Pain    Back Pain    Hip Pain         Assessment   Problem List Items Addressed This Visit       DIAMOND (obstructive sleep apnea)- moderate (AHI 17) (Chronic)    Relevant Medications    Semaglutide-Weight Management (WEGOVY) 1.7 MG/0.75ML pen    Overweight with body mass index (BMI) of 29 to 29.9 in adult    Relevant Medications    Semaglutide-Weight Management (WEGOVY) 1.7 MG/0.75ML pen     Other Visit Diagnoses       History of obesity    -  Primary    Relevant Medications    Semaglutide-Weight Management (WEGOVY) 1.7 MG/0.75ML pen             PLAN/DISCUSSION:  Congratulated patient on overall weight loss and lifestyle changes. Emphasized the importance of diet and activity for long term success. Patient is stable on the wegovy 1.7 mg dose.     Plan:  Continue wegovy 1.7 mg - 6 month prescription sent over  Continue increasing activity per orthopedics guidelines      FOLLOW-UP:  April         SUBJECTIVE/OBJECTIVE:  Patient last seen 5/15/2024 and was continued on wegovy .  Still thinks wegovy continues to work. No late night snacking and cut back on diet coke. Had rotator cuff surgery done a few months ago. Still in a sling. Will be starting PT soon. Activity has really been limited walking. Looking forward to returning to [employer] soon.    Recent lab reviewed.      Anti-obesity medications:   Current: wegovy 1.7 on   Side effects:  Denies nausea, vomiting, abdominal pain, severe constipation, diarrhea, or  heartburn    Antiobesity medication history:  Phentermine (Lomaira, Adipex) Not real effective   Phentermine/Topiramate (Qsymia)     Bupropion/Naltrexone (Contrave) Lost weight initially 10-12 lbs   Liraglutide (Saxenda)     Semaglutide (Wegovy)     Tirzepatide (Zepbound)     Orlistat (Xenical/Jorge)     Off-Label Medications for Obesity  Semaglutide (Ozempic)     Tirzepatide (Mounjaro)     Bupropion (Wellbutrin)     Metformin(Glucophage)     Topiramate (Topamax) Caution due to hx of kidney stones             5/14/2024     8:44 PM   Weight Loss Medication History Reviewed With Patient   Which weight loss medications are you currently taking on a regular basis? Wegovy   Are you having any side effects from the weight loss medication that we have prescribed you? No       Recent diet changes:   B: bowl of cereal. Might have coffee with protein shake  L: not hungry - might have a granola bar  D: Spaghetti with meat   Fluids: 80 oz water. 1 can of diet coke. About 3 alcoholic beverages weekly     Recent exercise/activity changes: Limited due to shoulder surgery. [place]    Recent stressors: low      CURRENT WEIGHT:   199 lbs 3.2 oz    Initial Weight (lbs): 227 lbs  Last Visits Weight: 204 lb (92.5 kg)  Cumulative weight loss (lbs): 27.8  Weight Loss Percentage: 12.25%        5/14/2024     8:44 PM   Changes and Difficulties   I have made the following changes to my diet since my last visit: Eating less and not sna wil as much.   With regards to my diet, I am still struggling with: Nothing really.   I have made the following changes to my activity/exercise since my last visit: Getting more exercise. Walking alot and olaying pickleball and golf.   With regards to my activity/exercise, I am still struggling with: Pain         MEDICATIONS:   Current Outpatient Medications   Medication Sig Dispense Refill    atorvastatin (LIPITOR) 80 MG tablet Take 1 tablet (80 mg) by mouth daily 90 tablet 1     "Cholecalciferol (VITAMIN D) 2000 UNITS tablet Take 2,000 Units by mouth      famotidine (PEPCID) 40 MG tablet TAKE ONE TABLET BY MOUTH ONCE DAILY 90 tablet 1    gabapentin (NEURONTIN) 100 MG capsule Take 1 capsule (100 mg) by mouth at bedtime for 3 days, THEN 2 capsules (200 mg) at bedtime for 3 days, THEN 3 capsules (300 mg) at bedtime. 90 capsule 0    montelukast (SINGULAIR) 10 MG tablet TAKE ONE TABLET BY MOUTH AT BEDTIME 90 tablet 0    Multiple Vitamins-Minerals (MULTIVITAMIN OR)       order for DME Resmed Airsense 10 auto cpap 10-14 cm, Mirage Fx nasal mask std      rOPINIRole (REQUIP) 2 MG tablet Take 2 tablets (4 mg) by mouth at bedtime. daily 60 tablet 1    Semaglutide-Weight Management (WEGOVY) 1.7 MG/0.75ML pen Inject 1.7 mg subcutaneously every 7 days. 9 mL 1    Semaglutide-Weight Management (WEGOVY) 1.7 MG/0.75ML pen Inject 1.7 mg subcutaneously once a week. 3 mL 0    tadalafil (CIALIS) 5 MG tablet TAKE ONE TABLET BY MOUTH ONCE DAILY 90 tablet 0    COMPOUNDED NON-CONTROLLED SUBSTANCE (CMPD RX) - PHARMACY TO MIX COMPOUNDED MEDICATION Apply 20 mLs into each nare daily Mupirocin 22grams/liter saline (Patient not taking: Reported on 11/6/2024) 1000 mL 6         ROS:  General  Fatigue: No  Sleep Quality: OK      PHYSICAL EXAM:  Objective    /86   Pulse 71   Ht 5' 9\" (1.753 m)   Wt 199 lb 3.2 oz (90.4 kg)   BMI 29.42 kg/m    GENERAL: Healthy, alert and no distress  EYES: Eyes grossly normal to inspection.  No discharge or erythema, or obvious scleral/conjunctival abnormalities.  RESP: No audible wheeze, cough, or visible cyanosis.  No visible retractions or increased work of breathing.    SKIN: Visible skin clear. No significant rash, abnormal pigmentation or lesions.  NEURO: Cranial nerves grossly intact.  Mentation and speech appropriate for age.  PSYCH: Mentation appears normal, affect normal/bright, judgement and insight intact, normal speech and appearance " well-groomed.        Sincerely,    Micha Arroyo PA-C    17 minutes spent on the date of the encounter doing chart review, review of test results, patient visit and documentation

## 2024-11-08 DIAGNOSIS — J45.40 MODERATE PERSISTENT ASTHMA WITHOUT COMPLICATION: Chronic | ICD-10-CM

## 2024-11-08 DIAGNOSIS — N52.9 ERECTILE DYSFUNCTION, UNSPECIFIED ERECTILE DYSFUNCTION TYPE: Chronic | ICD-10-CM

## 2024-11-08 DIAGNOSIS — G25.81 RESTLESS LEGS SYNDROME (RLS): ICD-10-CM

## 2024-11-11 RX ORDER — MONTELUKAST SODIUM 10 MG/1
TABLET ORAL
Qty: 90 TABLET | Refills: 0 | Status: SHIPPED | OUTPATIENT
Start: 2024-11-11

## 2024-11-11 RX ORDER — TADALAFIL 5 MG/1
TABLET ORAL
Qty: 90 TABLET | Refills: 0 | Status: SHIPPED | OUTPATIENT
Start: 2024-11-11

## 2024-11-11 RX ORDER — ROPINIROLE 2 MG/1
4 TABLET, FILM COATED ORAL AT BEDTIME
Qty: 60 TABLET | Refills: 0 | Status: SHIPPED | OUTPATIENT
Start: 2024-11-11

## 2024-11-11 NOTE — TELEPHONE ENCOUNTER
Prescription approved per [facility] Refill Protocol.  Nancy Gooden RN  [facility]

## 2024-11-16 ENCOUNTER — PATIENT OUTREACH (OUTPATIENT)
Dept: CARE COORDINATION | Facility: CLINIC | Age: 59
End: 2024-11-16
Payer: COMMERCIAL

## 2024-12-08 DIAGNOSIS — G25.81 RESTLESS LEGS SYNDROME (RLS): ICD-10-CM

## 2024-12-09 RX ORDER — ROPINIROLE 2 MG/1
4 TABLET, FILM COATED ORAL AT BEDTIME
Qty: 60 TABLET | Refills: 2 | Status: SHIPPED | OUTPATIENT
Start: 2024-12-09

## 2024-12-18 ENCOUNTER — TELEPHONE (OUTPATIENT)
Dept: SLEEP MEDICINE | Facility: CLINIC | Age: 59
End: 2024-12-18
Payer: COMMERCIAL

## 2024-12-18 NOTE — TELEPHONE ENCOUNTER
General Call      Reason for Call:  patient called and scheduled an appt next Friday with  [facility]     Would like a callback as machine 9 years old is stating motor exceeding life before appt next week.    Thank you.    What are your questions or concerns:  yes see [email or web address]    Date of last appointment with provider: Next week    Could we send this information to you in [employer] or would you prefer to receive a phone call?:   Patient would prefer a phone call   Okay to leave a detailed message?: Yes at Cell number on file:    Telephone Information:   Mobile [phone]

## 2024-12-30 DIAGNOSIS — G25.81 RESTLESS LEGS SYNDROME (RLS): ICD-10-CM

## 2024-12-30 RX ORDER — GABAPENTIN 300 MG/1
300 CAPSULE ORAL AT BEDTIME
Qty: 90 CAPSULE | Refills: 0 | Status: SHIPPED | OUTPATIENT
Start: 2024-12-30

## 2025-01-14 DIAGNOSIS — K21.9 GASTROESOPHAGEAL REFLUX DISEASE WITHOUT ESOPHAGITIS: ICD-10-CM

## 2025-01-14 RX ORDER — FAMOTIDINE 40 MG/1
TABLET, FILM COATED ORAL
Qty: 90 TABLET | Refills: 1 | Status: SHIPPED | OUTPATIENT
Start: 2025-01-14

## 2025-01-30 ENCOUNTER — NURSE TRIAGE (OUTPATIENT)
Dept: FAMILY MEDICINE | Facility: CLINIC | Age: 60
End: 2025-01-30
Payer: COMMERCIAL

## 2025-01-30 NOTE — TELEPHONE ENCOUNTER
"Called patient to triage sx further, see triage assessment below for additional info.     No clinic visit available today and offered patient ADS referral with ADS process reviewed. Advise patient may need labs and imaging to be appropriately diagnosed. Patient agreeable to this option if accepted.     Called ADS and spoke with Dr. Lopez. Provider states can complete CT and accepted patient for 1/31/25. Provider will have ADS staff to follow up on appointment scheduling.     GÉNESIS Brink  [facility]       Reason for Disposition   Patient wants to be seen    Additional Information   Negative: SEVERE abdominal pain   Negative: Hernia is painful or tender to touch   Negative: Vomiting and can't reduce the hernia   Negative: Vomiting and abdomen looks much more swollen than usual   Negative: Vomiting and hernia is more painful or swollen than usual   Negative: Swollen lump in groin and pulsating (like heartbeat)   Negative: Patient sounds very sick or weak to the triager   Negative: Constant abdominal pain and present > 2 hours (NO pain or tenderness of hernia)   Negative: Swelling of scrotum and has not previously been diagnosed with a hernia   Negative: Can't reduce the hernia (NO pain, local tenderness, or vomiting)    Answer Assessment - Initial Assessment Questions  1. ONSET:  \"When did this first appear?\"      Notice about 3 weeks ago   2. APPEARANCE: \"What does it look like?\"      Pelvic area and bulging out more, looks more swollen on right side. Feel a lump inside on right side   3. SIZE: \"How big is it?\" (inches, cm or compare to coins, fruit)      Currently a golf ball, vary in size last night was size of lemon.   4. LOCATION: \"Where exactly is the hernia located?\"      On right side pelvic area  5. PATTERN: \"Does the swelling come and go, or has it been constant since it started?\"      Swelling is constant and \"always there.\"    6. PAIN: \"Is there any pain?\" If Yes, ask: \"How bad is " "it?\"  (Scale 1-10; or mild, moderate, severe)      Constant dull ache, 1-2/10  7. DIAGNOSIS: \"Have you been seen by a doctor (or NP/PA) for this?\" \"Did the doctor diagnose you as having a hernia?\"      No   8. OTHER SYMPTOMS: \"Do you have any other symptoms?\" (e.g., fever, abdomen pain, vomiting)      No   9. PREGNANCY: \"Is there any chance you are pregnant?\" \"When was your last menstrual period?\"      N/a    Protocols used: Hernia-A-OH    "

## 2025-01-31 ENCOUNTER — ANCILLARY PROCEDURE (OUTPATIENT)
Dept: CT IMAGING | Facility: CLINIC | Age: 60
End: 2025-01-31
Attending: FAMILY MEDICINE
Payer: COMMERCIAL

## 2025-01-31 ENCOUNTER — ANCILLARY PROCEDURE (OUTPATIENT)
Dept: ULTRASOUND IMAGING | Facility: CLINIC | Age: 60
End: 2025-01-31
Attending: FAMILY MEDICINE
Payer: COMMERCIAL

## 2025-01-31 DIAGNOSIS — K40.90 NON-RECURRENT UNILATERAL INGUINAL HERNIA WITHOUT OBSTRUCTION OR GANGRENE: ICD-10-CM

## 2025-01-31 DIAGNOSIS — R19.09 BULGE IN GROIN AREA: ICD-10-CM

## 2025-01-31 PROCEDURE — 72193 CT PELVIS W/DYE: CPT | Performed by: RADIOLOGY

## 2025-01-31 PROCEDURE — 76705 ECHO EXAM OF ABDOMEN: CPT | Performed by: RADIOLOGY

## 2025-01-31 RX ORDER — IOPAMIDOL 755 MG/ML
107 INJECTION, SOLUTION INTRAVASCULAR ONCE
Status: COMPLETED | OUTPATIENT
Start: 2025-01-31 | End: 2025-01-31

## 2025-01-31 RX ADMIN — IOPAMIDOL 107 ML: 755 INJECTION, SOLUTION INTRAVASCULAR at 12:45

## 2025-02-04 DIAGNOSIS — J45.40 MODERATE PERSISTENT ASTHMA WITHOUT COMPLICATION: Chronic | ICD-10-CM

## 2025-02-04 DIAGNOSIS — N52.9 ERECTILE DYSFUNCTION, UNSPECIFIED ERECTILE DYSFUNCTION TYPE: Chronic | ICD-10-CM

## 2025-02-04 DIAGNOSIS — E78.5 HYPERLIPIDEMIA LDL GOAL <130: ICD-10-CM

## 2025-02-05 RX ORDER — TADALAFIL 5 MG/1
TABLET ORAL
Qty: 90 TABLET | Refills: 1 | Status: SHIPPED | OUTPATIENT
Start: 2025-02-05

## 2025-02-05 RX ORDER — MONTELUKAST SODIUM 10 MG/1
TABLET ORAL
Qty: 90 TABLET | Refills: 1 | Status: SHIPPED | OUTPATIENT
Start: 2025-02-05

## 2025-02-05 RX ORDER — ATORVASTATIN CALCIUM 80 MG/1
80 TABLET, FILM COATED ORAL DAILY
Qty: 90 TABLET | Refills: 1 | Status: SHIPPED | OUTPATIENT
Start: 2025-02-05

## 2025-02-28 ENCOUNTER — TELEPHONE (OUTPATIENT)
Dept: FAMILY MEDICINE | Facility: CLINIC | Age: 60
End: 2025-02-28

## 2025-02-28 DIAGNOSIS — J01.90 ACUTE SINUSITIS WITH SYMPTOMS > 10 DAYS: Primary | ICD-10-CM

## 2025-02-28 RX ORDER — LEVOFLOXACIN 500 MG/1
500 TABLET, FILM COATED ORAL DAILY
Qty: 10 TABLET | Refills: 0 | Status: SHIPPED | OUTPATIENT
Start: 2025-02-28 | End: 2025-03-10

## 2025-02-28 NOTE — TELEPHONE ENCOUNTER
Snehal, with [facility] pharmacy calling & needs clarification of sig for a med rx'd today:    levofloxacin (LEVAQUIN) 500 MG tablet Take 1 tablet (500 mg) by mouth every 10 days for 10 days. 1 tablet 0 ordered 02/28/2025 03/10/2025     Routing to provider to clarify sig: Unique, for the Levaquin, do you want one tablet by mouth every day for 10 days?      Deanna Myesr RN

## 2025-02-28 NOTE — TELEPHONE ENCOUNTER
She only asked for 1 tablet to be dispensed. They are asking if she wants #10. Can send a new script, RN pended it for you.  Maite Burgos RN on 2/28/2025 at 3:44 PM

## 2025-03-03 DIAGNOSIS — G25.81 RESTLESS LEGS SYNDROME (RLS): ICD-10-CM

## 2025-03-03 RX ORDER — ROPINIROLE 2 MG/1
4 TABLET, FILM COATED ORAL AT BEDTIME
Qty: 60 TABLET | Refills: 0 | Status: SHIPPED | OUTPATIENT
Start: 2025-03-03

## 2025-03-04 NOTE — TELEPHONE ENCOUNTER
[facility] was notified that order for levofloxacin has been corrected by Dr James   verbal cues/1 person assist

## 2025-03-05 ENCOUNTER — TELEPHONE (OUTPATIENT)
Dept: FAMILY MEDICINE | Facility: CLINIC | Age: 60
End: 2025-03-05

## 2025-03-05 ENCOUNTER — OFFICE VISIT (OUTPATIENT)
Dept: FAMILY MEDICINE | Facility: CLINIC | Age: 60
End: 2025-03-05
Payer: COMMERCIAL

## 2025-03-05 VITALS
HEART RATE: 78 BPM | TEMPERATURE: 97.6 F | SYSTOLIC BLOOD PRESSURE: 112 MMHG | HEIGHT: 69 IN | WEIGHT: 208.3 LBS | OXYGEN SATURATION: 96 % | RESPIRATION RATE: 20 BRPM | BODY MASS INDEX: 30.85 KG/M2 | DIASTOLIC BLOOD PRESSURE: 70 MMHG

## 2025-03-05 DIAGNOSIS — J01.01 ACUTE RECURRENT MAXILLARY SINUSITIS: Primary | ICD-10-CM

## 2025-03-05 PROCEDURE — 99214 OFFICE O/P EST MOD 30 MIN: CPT | Performed by: INTERNAL MEDICINE

## 2025-03-05 PROCEDURE — 3074F SYST BP LT 130 MM HG: CPT | Performed by: INTERNAL MEDICINE

## 2025-03-05 PROCEDURE — 3078F DIAST BP <80 MM HG: CPT | Performed by: INTERNAL MEDICINE

## 2025-03-05 PROCEDURE — 1126F AMNT PAIN NOTED NONE PRSNT: CPT | Performed by: INTERNAL MEDICINE

## 2025-03-05 RX ORDER — DOXYCYCLINE HYCLATE 100 MG
100 TABLET ORAL 2 TIMES DAILY
Qty: 20 TABLET | Refills: 0 | Status: SHIPPED | OUTPATIENT
Start: 2025-03-05

## 2025-03-05 RX ORDER — METHYLPREDNISOLONE 4 MG/1
TABLET ORAL
Qty: 21 TABLET | Refills: 0 | Status: SHIPPED | OUTPATIENT
Start: 2025-03-05

## 2025-03-05 ASSESSMENT — PAIN SCALES - GENERAL: PAINLEVEL_OUTOF10: NO PAIN (0)

## 2025-03-05 NOTE — PROGRESS NOTES
"  Assessment & Plan     Acute recurrent maxillary sinusitis  He has a history of deviated nasal septum and also perforated nasal septum  Was following up with ENT  He started experiencing sinus congestion symptoms in January  The symptoms started after he had some runny nose  Then he started developing sinus congestion symptoms  He went to the ER  Was prescribed Augmentin  He did not get better with that  Symptoms continued to worse  Was only getting transient relief from the Afrin no spray  We then went to urgent care where he was prescribed levofloxacin  His symptoms actually got worse  Now he is coughing  He is supposed to be on a compounded preparation for his purulent rhinitis which was prescribed by his ENT but he has not been taking that very regularly recently  At this point of time we will stop levofloxacin has he has not responded to this  I will start doxycycline  Also will do a Medrol Dosepak  We will get a sinus x-ray  We will get an ENT opinion because of his underlying history of purulent rhinitis/septal perforation issues  - methylPREDNISolone (MEDROL DOSEPAK) 4 MG tablet therapy pack; Follow Package Directions  - doxycycline hyclate (VIBRA-TABS) 100 MG tablet; Take 1 tablet (100 mg) by mouth 2 times daily.  - XR Sinus Complete G/E 3 Views; Future  - Adult ENT  Referral; Future          BMI  Estimated body mass index is 30.76 kg/m  as calculated from the following:    Height as of this encounter: 1.753 m (5' 9\").    Weight as of this encounter: 94.5 kg (208 lb 4.8 oz).             Subjective   STEPHANI is a 59 year old, presenting for the following health issues:  Follow Up (Sinusitis.  Last seen for video visit 2-28-25 given Levequin/Now having some chest discomfort with coughing.  Sputum both nasal and throat go yellow/green./)      3/5/2025     2:41 PM   Additional Questions   Roomed by Elena SUBRAMANIAN   Accompanied by self     History of Present Illness       Reason for visit:  Sinus problems    He " "eats 2-3 servings of fruits and vegetables daily.He consumes 0 sweetened beverage(s) daily.He exercises with enough effort to increase his heart rate 20 to 29 minutes per day.  He exercises with enough effort to increase his heart rate 4 days per week.   He is taking medications regularly.                  Review of Systems  Constitutional, HEENT, cardiovascular, pulmonary, gi and gu systems are negative, except as otherwise noted.      Objective    /70 (BP Location: Right arm, Patient Position: Sitting, Cuff Size: Adult Large)   Pulse 78   Temp 97.6  F (36.4  C) (Oral)   Resp 20   Ht 1.753 m (5' 9\")   Wt 94.5 kg (208 lb 4.8 oz)   SpO2 96%   BMI 30.76 kg/m    Body mass index is 30.76 kg/m .  Physical Exam   GENERAL: alert and no distress  EYES: Eyes grossly normal to inspection, PERRL and conjunctivae and sclerae normal  HENT: ear canals and TM's normal, nose and mouth without ulcers or lesions  HENT: Tender on palpation of both maxillary sinuses  No swollen nasal mucosa or turbinates appreciated  NECK: no adenopathy, no asymmetry, masses, or scars  RESP: lungs clear to auscultation - no rales, rhonchi or wheezes  CV: regular rate and rhythm, normal S1 S2, no S3 or S4, no murmur, click or rub, no peripheral edema  MS: no gross musculoskeletal defects noted, no edema            Signed Electronically by: Jorge Luis Leiva MD    "

## 2025-03-05 NOTE — TELEPHONE ENCOUNTER
Patient calling and reports was seen 1/26/2025 and given Augmentin for sinusitis. Patient reports medication did not improve symptoms at all. Patient followed up on 02/28/2025 and given levofloxacin and still currently taking medication. [place] through antibiotic and patient is feels symptoms are almost worse. Feels as though symptoms are moving into a chest congestion. Patient does use a CPAP and has cleaned out machine, and changed out equipment. Patient continues to use saline nasal washes, and decongestants. Patient has been using Afrin and wondering if Afrin could be causing worsening symptoms. Denies fever, chest pain, shortness of breath, or any other red flag symptoms.     Made an appointment to be seen in clinic today.    Future Appointments 3/5/2025 - 9/1/2025        Date Visit Type Length Department Provider     3/5/2025  3:00 PM OFFICE VISIT 30 min BA FM/IM/Jorge Luis Crenshaw MD    Location Instructions:     [facility] is located at [address] in [place]. This is just west of the [facility] exit off of [address]. Free parking is available; from [facility], access the lot by turning onto [address] or [address].               3/12/2025  9:00 AM NEW HERNIA SURGERY 30 min MG SURG Isaak Choudhury MD    Location Instructions:     [facility] 2nd Floor  From Sign in Desk D.               4/23/2025  3:30 PM RETURN MEDICAL WEIGHT MGMT 30 min SH SURGICAL WGHT LOSS Micha Arroyo PA-C    Location Instructions:     Located at [address] on the west side of [facility].&nbsp; It is best to park in the [address] on the [address] corner of [address]nbsp;  Walk across the skyway that goes over [address] into the hospital.&nbsp; When entering into the hospital, go to the Left, past the stairway, and take the elevator to the 4th floor, [address].&nbsp;                       Amanda Kent RN

## 2025-03-06 ENCOUNTER — ANCILLARY PROCEDURE (OUTPATIENT)
Dept: GENERAL RADIOLOGY | Facility: CLINIC | Age: 60
End: 2025-03-06
Attending: INTERNAL MEDICINE
Payer: COMMERCIAL

## 2025-03-06 DIAGNOSIS — J01.01 ACUTE RECURRENT MAXILLARY SINUSITIS: ICD-10-CM

## 2025-03-12 ENCOUNTER — TELEPHONE (OUTPATIENT)
Dept: SURGERY | Facility: CLINIC | Age: 60
End: 2025-03-12

## 2025-03-12 ENCOUNTER — OFFICE VISIT (OUTPATIENT)
Dept: SURGERY | Facility: CLINIC | Age: 60
End: 2025-03-12
Attending: FAMILY MEDICINE
Payer: COMMERCIAL

## 2025-03-12 VITALS
HEART RATE: 77 BPM | SYSTOLIC BLOOD PRESSURE: 113 MMHG | BODY MASS INDEX: 29.92 KG/M2 | OXYGEN SATURATION: 95 % | RESPIRATION RATE: 16 BRPM | HEIGHT: 69 IN | DIASTOLIC BLOOD PRESSURE: 74 MMHG | WEIGHT: 202 LBS

## 2025-03-12 DIAGNOSIS — K40.90 NON-RECURRENT UNILATERAL INGUINAL HERNIA WITHOUT OBSTRUCTION OR GANGRENE: ICD-10-CM

## 2025-03-12 ASSESSMENT — PAIN SCALES - GENERAL: PAINLEVEL_OUTOF10: MILD PAIN (2)

## 2025-03-12 NOTE — LETTER
3/12/2025      Juan Treadwell  [address]  [place] [address]      Dear Colleague,    Thank you for referring your patient, Juan Treadwell, to the [facility]. Please see a copy of my visit note below.    Patient seen in consultation for hernia by Svetlana Chavis MD     HPI:  Patient is a 59 year old male  with complaints of right groin bulge  The patient noticed the symptoms about 4 months ago.    Can get some pain at times but not often. Depends on the day or activities  Reduces  nothing makes the episode better.  No prior repair  Left side feels normal  Patient has not family history of hernia problems    Review Of Systems    Skin: negative  Ears/Nose/Throat: negative  Respiratory: No shortness of breath, dyspnea on exertion, cough, or hemoptysis  Cardiovascular: negative  Gastrointestinal: negative  Genitourinary: negative  Musculoskeletal: as above  Neurologic: negative  Hematologic/Lymphatic/Immunologic: negative  Endocrine: negative      Past Medical History:   Diagnosis Date     Anal fistula      Impingement syndrome of left shoulder 11/10/2020     Kidney stones 2016 2016 One episode     Low back pain 08/23/2017    Herniated disk     Uncomplicated asthma    No asthma per pt    Past Surgical History:   Procedure Laterality Date     COLONOSCOPY N/A 12/16/2014    Procedure: COLONOSCOPY;  Surgeon: Raz Ortiz MD;  Location: [facility] OR     EXAM UNDER ANESTHESIA, FISTULOTOMY RECTUM, COMBINED N/A 12/16/2014    Procedure: COMBINED EXAM UNDER ANESTHESIA, FISTULOTOMY RECTUM;  Surgeon: Raz Ortiz MD;  Location:  OR     [facility]         Social History     Socioeconomic History     Marital status:      Spouse name: Not on file     Number of children: Not on file     Years of education: Not on file     Highest education level: Not on file   Occupational History     Occupation:  IT   Tobacco Use     Smoking status:  Never     Passive exposure: Never     Smokeless tobacco: Never     Tobacco comments:     no 2nd hand smoke exposure   Vaping Use     Vaping status: Never Used   Substance and Sexual Activity     Alcohol use: Yes     Alcohol/week: 3.0 - 4.0 standard drinks of alcohol     Comment: 3-4 drinks weekly     Drug use: Never     Sexual activity: Yes     Partners: Female     Birth control/protection: I.U.D.   Other Topics Concern     Parent/sibling w/ CABG, MI or angioplasty before 65F 55M? No   Social History Narrative     Not on file     Social Drivers of Health     Financial Resource Strain: High Risk (1/1/2022)    [facility]    Financial Resource Strain      Difficulty of Paying Living Expenses: Not on file      Difficulty of Paying Living Expenses: Not on file   Food Insecurity: Not on file   Transportation Needs: Not on file   Physical Activity: Not on file   Stress: Not on file   Social Connections: Unknown (6/24/2024)    Received from [facility]    Social Connections      Frequency of Communication with Friends and Family: Not on file   Interpersonal Safety: Low Risk  (3/5/2025)    Interpersonal Safety      Do you feel physically and emotionally safe where you currently live?: Yes      Within the past 12 months, have you been hit, slapped, kicked or otherwise physically hurt by someone?: No      Within the past 12 months, have you been humiliated or emotionally abused in other ways by your partner or ex-partner?: No   Housing Stability: Not on file       Current Outpatient Medications   Medication Sig Dispense Refill     atorvastatin (LIPITOR) 80 MG tablet TAKE ONE TABLET BY MOUTH ONCE DAILY 90 tablet 1     Cholecalciferol (VITAMIN D) 2000 UNITS tablet Take 2,000 Units by mouth       COMPOUNDED NON-CONTROLLED SUBSTANCE (CMPD RX) - PHARMACY TO MIX COMPOUNDED MEDICATION Apply 20 mLs into each  "nare daily Mupirocin 22grams/liter saline 1000 mL 6     doxycycline hyclate (VIBRA-TABS) 100 MG tablet Take 1 tablet (100 mg) by mouth 2 times daily. 20 tablet 0     famotidine (PEPCID) 40 MG tablet TAKE ONE TABLET BY MOUTH ONCE DAILY 90 tablet 1     gabapentin (NEURONTIN) 300 MG capsule Take 1 capsule (300 mg) by mouth at bedtime. 90 capsule 0     montelukast (SINGULAIR) 10 MG tablet TAKE ONE TABLET BY MOUTH AT BEDTIME 90 tablet 1     Multiple Vitamins-Minerals (MULTIVITAMIN OR)        order for DME Resmed Airsense 10 auto cpap 10-14 cm, Mirage Fx nasal mask std       rOPINIRole (REQUIP) 2 MG tablet TAKE TWO TABLETS BY MOUTH DAILY AT BEDTIME 60 tablet 0     Semaglutide-Weight Management (WEGOVY) 1.7 MG/0.75ML pen Inject 1.7 mg subcutaneously every 7 days. 9 mL 1     tadalafil (CIALIS) 5 MG tablet TAKE ONE TABLET BY MOUTH ONCE DAILY 90 tablet 1     tamsulosin (FLOMAX) 0.4 MG capsule Take 1 capsule (0.4 mg) by mouth daily. 30 capsule 0     methylPREDNISolone (MEDROL DOSEPAK) 4 MG tablet therapy pack Follow Package Directions 21 tablet 0     Semaglutide-Weight Management (WEGOVY) 1.7 MG/0.75ML pen Inject 1.7 mg subcutaneously once a week. 3 mL 0       Medications and history reviewed    Physical exam:  Vitals: /74   Pulse 77   Resp 16   Ht 1.753 m (5' 9\")   Wt 91.6 kg (202 lb)   SpO2 95%   BMI 29.83 kg/m    BMI= Body mass index is 29.83 kg/m .    Constitutional: healthy, alert, and no distress  Head: Normocephalic. No masses, lesions, tenderness or abnormalities  Gastrointestinal: Abdomen soft, non-tender. BS normal. No masses, organomegaly  : male positive for reducible right inguinal hernia. No definite hernia on the left.  Musculoskeletal: extremities normal- no gross deformities noted, gait normal, and normal muscle tone  Skin: no suspicious lesions or rashes  Psychiatric: mentation appears normal and affect normal/bright      Imaging shows:  EXAM: US HERNIA EVALUATION  LOCATION: [facility]  DATE: 1/31/2025     INDICATION: Right inguinal hernia suspected, but not clearly reducible   need to confirm cause  COMPARISON: None.     TECHNIQUE: Transabdominal ultrasound of the groin during rest and Valsalva.     FINDINGS: Within the right groin area of clinical concern there is anterior bulging of fatty tissue and a peristalsing bowel segment. This area measured 3.8 cm. These findings are consistent with a bowel containing inguinal hernia.                                                                      IMPRESSION:  1.  Findings consistent with a small to moderate-sized fat and bowel containing right inguinal hernia. Recommend further evaluation with pelvic CT.    EXAM: CT PELVIS SOFT TISSUE WITH CONTRAST  LOCATION: [facility]  DATE: 01/31/2025     INDICATION: Ultrasound with inguinal hernia right. Recommended pelvic CT.  COMPARISON: None.  TECHNIQUE: CT scan of the pelvis was performed with IV contrast. Multiplanar reformats were obtained. Dose reduction techniques were used.  CONTRAST: 107 mL Isovue 370.     FINDINGS:     PELVIC ORGANS: A small fat-containing right inguinal hernia is seen medial to the epigastric vessels. Normal bowel. Partially visualized kidneys are normal. Normal bladder. Mildly enlarged prostate. No lymphadenopathy.     MUSCULOSKELETAL: Moderate to severe multilevel discogenic degenerative change most prominent at L3-L4 and L5-S1.                                                                      IMPRESSION:  1.  Small fat-containing right inguinal hernia.  2.  No obstruction. Normal bowel.  3.  Mildly enlarged prostate.    Assessment:     ICD-10-CM    1. Non-recurrent unilateral inguinal hernia without obstruction or gangrene  K40.90 Adult Gen Surg  Referral     Case Request: HERNIORRHAPHY, INGUINAL, ROBOT-ASSISTED, LAPAROSCOPIC, USING DA KELLY XI right possible bilateral        Plan: Offered robotic approach to his right  inguinal hernia repair.  Discussed possibility of looking on the left and if hernia found would be able to fix at time of surgery and he is agreeable to this.  Risks of surgery discussed including, but not limited to bleeding, infection, recurrence, damage to intra-abdominal contents such as nerves, blood vessels, bowel or other organs.  Risks of anesthesia also discussed.  Although mesh is a better long term repair if it gets infected it must be removed. Mesh problems such as fracture, erosion or adhesions are possible.  If there is evidence of an infection at time of surgery it will be cancelled and rescheduled to when well.    Discussed massaging hernia back in and using ice if becomes more painful.  If not able to reduce then go to emergency room.      Isaak Choudhury MD      Again, thank you for allowing me to participate in the care of your patient.        Sincerely,        Isaak Choudhury MD    Electronically signed

## 2025-03-12 NOTE — PROGRESS NOTES
Patient seen in consultation for hernia by Svetlana Chavis MD     HPI:  Patient is a 59 year old male  with complaints of right groin bulge  The patient noticed the symptoms about 4 months ago.    Can get some pain at times but not often. Depends on the day or activities  Reduces  nothing makes the episode better.  No prior repair  Left side feels normal  Patient has not family history of hernia problems    Review Of Systems    Skin: negative  Ears/Nose/Throat: negative  Respiratory: No shortness of breath, dyspnea on exertion, cough, or hemoptysis  Cardiovascular: negative  Gastrointestinal: negative  Genitourinary: negative  Musculoskeletal: as above  Neurologic: negative  Hematologic/Lymphatic/Immunologic: negative  Endocrine: negative      Past Medical History:   Diagnosis Date    Anal fistula     Impingement syndrome of left shoulder 11/10/2020    Kidney stones 2016 2016 One episode    Low back pain 08/23/2017    Herniated disk    Uncomplicated asthma    No asthma per pt    Past Surgical History:   Procedure Laterality Date    COLONOSCOPY N/A 12/16/2014    Procedure: COLONOSCOPY;  Surgeon: Raz Ortiz MD;  Location: [facility] OR    EXAM UNDER ANESTHESIA, FISTULOTOMY RECTUM, COMBINED N/A 12/16/2014    Procedure: COMBINED EXAM UNDER ANESTHESIA, FISTULOTOMY RECTUM;  Surgeon: Raz Ortiz MD;  Location: [facility] OR    [facility]         Social History     Socioeconomic History    Marital status:      Spouse name: Not on file    Number of children: Not on file    Years of education: Not on file    Highest education level: Not on file   Occupational History    Occupation:  IT   Tobacco Use    Smoking status: Never     Passive exposure: Never    Smokeless tobacco: Never    Tobacco comments:     no 2nd hand smoke exposure   Vaping Use    Vaping status: Never Used   Substance and Sexual Activity    Alcohol use: Yes     Alcohol/week: 3.0 - 4.0 standard  drinks of alcohol     Comment: 3-4 drinks weekly    Drug use: Never    Sexual activity: Yes     Partners: Female     Birth control/protection: I.U.D.   Other Topics Concern    Parent/sibling w/ CABG, MI or angioplasty before 65F 55M? No   Social History Narrative    Not on file     Social Drivers of Health     Financial Resource Strain: High Risk (1/1/2022)    [facility]    Financial Resource Strain     Difficulty of Paying Living Expenses: Not on file     Difficulty of Paying Living Expenses: Not on file   Food Insecurity: Not on file   Transportation Needs: Not on file   Physical Activity: Not on file   Stress: Not on file   Social Connections: Unknown (6/24/2024)    Received from [facility]    Social Connections     Frequency of Communication with Friends and Family: Not on file   Interpersonal Safety: Low Risk  (3/5/2025)    Interpersonal Safety     Do you feel physically and emotionally safe where you currently live?: Yes     Within the past 12 months, have you been hit, slapped, kicked or otherwise physically hurt by someone?: No     Within the past 12 months, have you been humiliated or emotionally abused in other ways by your partner or ex-partner?: No   Housing Stability: Not on file       Current Outpatient Medications   Medication Sig Dispense Refill    atorvastatin (LIPITOR) 80 MG tablet TAKE ONE TABLET BY MOUTH ONCE DAILY 90 tablet 1    Cholecalciferol (VITAMIN D) 2000 UNITS tablet Take 2,000 Units by mouth      COMPOUNDED NON-CONTROLLED SUBSTANCE (CMPD RX) - PHARMACY TO MIX COMPOUNDED MEDICATION Apply 20 mLs into each nare daily Mupirocin 22grams/liter saline 1000 mL 6    doxycycline hyclate (VIBRA-TABS) 100 MG tablet Take 1 tablet (100 mg) by mouth 2 times daily. 20 tablet 0    famotidine (PEPCID) 40 MG tablet TAKE ONE TABLET BY MOUTH ONCE DAILY 90 tablet 1    gabapentin  "(NEURONTIN) 300 MG capsule Take 1 capsule (300 mg) by mouth at bedtime. 90 capsule 0    montelukast (SINGULAIR) 10 MG tablet TAKE ONE TABLET BY MOUTH AT BEDTIME 90 tablet 1    Multiple Vitamins-Minerals (MULTIVITAMIN OR)       order for DME Resmed Airsense 10 auto cpap 10-14 cm, Mirage Fx nasal mask std      rOPINIRole (REQUIP) 2 MG tablet TAKE TWO TABLETS BY MOUTH DAILY AT BEDTIME 60 tablet 0    Semaglutide-Weight Management (WEGOVY) 1.7 MG/0.75ML pen Inject 1.7 mg subcutaneously every 7 days. 9 mL 1    tadalafil (CIALIS) 5 MG tablet TAKE ONE TABLET BY MOUTH ONCE DAILY 90 tablet 1    tamsulosin (FLOMAX) 0.4 MG capsule Take 1 capsule (0.4 mg) by mouth daily. 30 capsule 0    methylPREDNISolone (MEDROL DOSEPAK) 4 MG tablet therapy pack Follow Package Directions 21 tablet 0    Semaglutide-Weight Management (WEGOVY) 1.7 MG/0.75ML pen Inject 1.7 mg subcutaneously once a week. 3 mL 0       Medications and history reviewed    Physical exam:  Vitals: /74   Pulse 77   Resp 16   Ht 1.753 m (5' 9\")   Wt 91.6 kg (202 lb)   SpO2 95%   BMI 29.83 kg/m    BMI= Body mass index is 29.83 kg/m .    Constitutional: healthy, alert, and no distress  Head: Normocephalic. No masses, lesions, tenderness or abnormalities  Gastrointestinal: Abdomen soft, non-tender. BS normal. No masses, organomegaly  : male positive for reducible right inguinal hernia. No definite hernia on the left.  Musculoskeletal: extremities normal- no gross deformities noted, gait normal, and normal muscle tone  Skin: no suspicious lesions or rashes  Psychiatric: mentation appears normal and affect normal/bright      Imaging shows:  EXAM: US HERNIA EVALUATION  LOCATION: [facility]  DATE: 1/31/2025     INDICATION: Right inguinal hernia suspected, but not clearly reducible   need to confirm cause  COMPARISON: None.     TECHNIQUE: Transabdominal ultrasound of the groin during rest and Valsalva.     FINDINGS: Within the right groin " area of clinical concern there is anterior bulging of fatty tissue and a peristalsing bowel segment. This area measured 3.8 cm. These findings are consistent with a bowel containing inguinal hernia.                                                                      IMPRESSION:  1.  Findings consistent with a small to moderate-sized fat and bowel containing right inguinal hernia. Recommend further evaluation with pelvic CT.    EXAM: CT PELVIS SOFT TISSUE WITH CONTRAST  LOCATION: [facility]  DATE: 01/31/2025     INDICATION: Ultrasound with inguinal hernia right. Recommended pelvic CT.  COMPARISON: None.  TECHNIQUE: CT scan of the pelvis was performed with IV contrast. Multiplanar reformats were obtained. Dose reduction techniques were used.  CONTRAST: 107 mL Isovue 370.     FINDINGS:     PELVIC ORGANS: A small fat-containing right inguinal hernia is seen medial to the epigastric vessels. Normal bowel. Partially visualized kidneys are normal. Normal bladder. Mildly enlarged prostate. No lymphadenopathy.     MUSCULOSKELETAL: Moderate to severe multilevel discogenic degenerative change most prominent at L3-L4 and L5-S1.                                                                      IMPRESSION:  1.  Small fat-containing right inguinal hernia.  2.  No obstruction. Normal bowel.  3.  Mildly enlarged prostate.    Assessment:     ICD-10-CM    1. Non-recurrent unilateral inguinal hernia without obstruction or gangrene  K40.90 Adult Gen Surg  Referral     Case Request: HERNIORRHAPHY, INGUINAL, ROBOT-ASSISTED, LAPAROSCOPIC, USING DA KELLY XI right possible bilateral        Plan: Offered robotic approach to his right inguinal hernia repair.  Discussed possibility of looking on the left and if hernia found would be able to fix at time of surgery and he is agreeable to this.  Risks of surgery discussed including, but not limited to bleeding, infection, recurrence, damage to intra-abdominal  contents such as nerves, blood vessels, bowel or other organs.  Risks of anesthesia also discussed.  Although mesh is a better long term repair if it gets infected it must be removed. Mesh problems such as fracture, erosion or adhesions are possible.  If there is evidence of an infection at time of surgery it will be cancelled and rescheduled to when well.    Discussed massaging hernia back in and using ice if becomes more painful.  If not able to reduce then go to emergency room.      Isaak Choudhury MD

## 2025-03-19 PROBLEM — K40.90 NON-RECURRENT UNILATERAL INGUINAL HERNIA WITHOUT OBSTRUCTION OR GANGRENE: Status: ACTIVE | Noted: 2025-03-12

## 2025-03-19 NOTE — TELEPHONE ENCOUNTER
Type of surgery: HERNIORRHAPHY, INGUINAL, ROBOT-ASSISTED, LAPAROSCOPIC, USING DA KELLY XI right possible bilateral (Right)   Location of surgery: [facility]  Date and time of surgery: 6-4-25  Surgeon: Macey  Pre-Op Appt Date:   Post-Op Appt Date:  6-25-25  Packet sent out: Yes  Pre-cert/Authorization completed:    Date:

## 2025-03-29 DIAGNOSIS — G25.81 RESTLESS LEGS SYNDROME (RLS): ICD-10-CM

## 2025-03-30 DIAGNOSIS — G25.81 RESTLESS LEGS SYNDROME (RLS): ICD-10-CM

## 2025-03-30 RX ORDER — GABAPENTIN 300 MG/1
300 CAPSULE ORAL AT BEDTIME
Qty: 90 CAPSULE | Refills: 0 | Status: SHIPPED | OUTPATIENT
Start: 2025-03-30

## 2025-03-31 RX ORDER — ROPINIROLE 2 MG/1
4 TABLET, FILM COATED ORAL AT BEDTIME
Qty: 180 TABLET | Refills: 3 | Status: SHIPPED | OUTPATIENT
Start: 2025-03-31

## 2025-04-30 ENCOUNTER — TELEPHONE (OUTPATIENT)
Dept: FAMILY MEDICINE | Facility: CLINIC | Age: 60
End: 2025-04-30
Payer: COMMERCIAL

## 2025-04-30 NOTE — TELEPHONE ENCOUNTER
Reason for Call:  Appointment Request    Patient requesting this type of appt: Pre-op    Requested provider: Kelli Porter    Reason patient unable to be scheduled: Not within requested timeframe    When does patient want to be seen/preferred time:  Before 06/04/25    Comments: Patient is having hernia surgery on 06/04/25 and would like to have a preop with his PCP     Could we send this information to you in [facility] or would you prefer to receive a phone call?:   Patient would prefer a phone call   Okay to leave a detailed message?: Yes at Cell number on file:    Telephone Information:   Mobile [phone]       Call taken on 4/30/2025 at 1:28 PM by Nghia Calvo

## 2025-05-19 ENCOUNTER — TELEPHONE (OUTPATIENT)
Dept: SURGERY | Facility: CLINIC | Age: 60
End: 2025-05-19
Payer: COMMERCIAL

## 2025-05-19 NOTE — TELEPHONE ENCOUNTER
Reason for Call:  Other call back    Detailed comments: Patient is scheduled for surgery on 06/04/2025 for  HERNIORRHAPHY, INGUINAL, ROBOT-ASSISTED, LAPAROSCOPIC, USING DA KELLY XI right possible bilateral (Right). Patient notes that he had emergency surgery for a lap appy on 05/12/2025. He is healing and feeling fine but wanted you to be aware. Hoping this will not affect his surgery. Please call to advise Thank you     Phone Number Patient can be reached at: Home number on file [phone] cell    Best Time: any    Can we leave a detailed message on this number? YES    Call taken on 5/19/2025 at 10:42 AM by Ericka Olguin

## 2025-05-21 ENCOUNTER — OFFICE VISIT (OUTPATIENT)
Dept: FAMILY MEDICINE | Facility: CLINIC | Age: 60
End: 2025-05-21
Payer: COMMERCIAL

## 2025-05-21 VITALS
TEMPERATURE: 97.8 F | OXYGEN SATURATION: 92 % | RESPIRATION RATE: 16 BRPM | WEIGHT: 202 LBS | HEIGHT: 68 IN | BODY MASS INDEX: 30.62 KG/M2 | DIASTOLIC BLOOD PRESSURE: 74 MMHG | HEART RATE: 76 BPM | SYSTOLIC BLOOD PRESSURE: 110 MMHG

## 2025-05-21 DIAGNOSIS — E29.1 MALE HYPOGONADISM: Chronic | ICD-10-CM

## 2025-05-21 DIAGNOSIS — K40.90 NON-RECURRENT UNILATERAL INGUINAL HERNIA WITHOUT OBSTRUCTION OR GANGRENE: ICD-10-CM

## 2025-05-21 DIAGNOSIS — Z90.49 S/P APPENDECTOMY: ICD-10-CM

## 2025-05-21 DIAGNOSIS — E66.3 OVERWEIGHT WITH BODY MASS INDEX (BMI) OF 29 TO 29.9 IN ADULT: ICD-10-CM

## 2025-05-21 DIAGNOSIS — J45.40 MODERATE PERSISTENT ASTHMA WITHOUT COMPLICATION: Chronic | ICD-10-CM

## 2025-05-21 DIAGNOSIS — E78.5 HYPERLIPIDEMIA LDL GOAL <130: Chronic | ICD-10-CM

## 2025-05-21 DIAGNOSIS — Z01.818 PREOP GENERAL PHYSICAL EXAM: Primary | ICD-10-CM

## 2025-05-21 PROCEDURE — 99214 OFFICE O/P EST MOD 30 MIN: CPT | Performed by: FAMILY MEDICINE

## 2025-05-21 PROCEDURE — 1125F AMNT PAIN NOTED PAIN PRSNT: CPT | Performed by: FAMILY MEDICINE

## 2025-05-21 PROCEDURE — 3074F SYST BP LT 130 MM HG: CPT | Performed by: FAMILY MEDICINE

## 2025-05-21 PROCEDURE — 3078F DIAST BP <80 MM HG: CPT | Performed by: FAMILY MEDICINE

## 2025-05-21 ASSESSMENT — PAIN SCALES - GENERAL: PAINLEVEL_OUTOF10: MILD PAIN (2)

## 2025-05-21 NOTE — PROGRESS NOTES
Preoperative Evaluation  [address] [address]  Phone: [phone]  Primary Provider: Kelli Porter MD  Pre-op Performing Provider: Kelli Porter MD  May 21, 2025             5/20/2025   Surgical Information   What procedure is being done? Hernia repair   Facility or Hospital where procedure/surgery will be performed: [facility]   Who is doing the procedure / surgery? Isaak Choudhury   Date of surgery / procedure: 6/4/2025   Time of surgery / procedure: 7:30am   Where do you plan to recover after surgery? at home with family     Fax number for surgical facility: Note does not need to be faxed, will be available electronically in Epic.    Assessment & Plan     The proposed surgical procedure is considered INTERMEDIATE risk.    Preop general physical exam    Non-recurrent unilateral inguinal hernia without obstruction or gangrene  Right inguinal hernia    S/P appendectomy  Had laparoscopic appendectomy done couple of weeks ago but should be healed up in time for procedure.    Hyperlipidemia LDL goal <130    Overweight with body mass index (BMI) of 29 to 29.9 in adult  On semaglutide    Moderate persistent asthma without complication  Controlled    Male hypogonadism            - No identified additional risk factors other than previously addressed    Preoperative Medication Instructions  Antiplatelet or Anticoagulation Medication Instructions   - We reviewed the medication list and the patient is not on an antiplatelet or anticoagulation medications.    Additional Medication Instructions  Take all scheduled medications on the day of surgery EXCEPT for modifications listed below:   - GLP-1 Injectable (exenitide, liraglutide, semaglutide, dulaglutide, etc.): DO NOT TAKE 7 days before surgery     Recommendation  Approval given to proceed with proposed procedure, without further diagnostic evaluation.        Sajan STYLES is a [place]  year old, presenting for the following:  Physical          5/21/2025     8:33 AM   Additional Questions   Roomed by Abbey SALEH:   Right inguinal hernia.  Planned repair.  Recently had acute appendicitis which required appendectomy but has healed up nicely from that.          5/20/2025   Pre-Op Questionnaire   Have you ever had a heart attack or stroke? No   Have you ever had surgery on your heart or blood vessels, such as a stent placement, a coronary artery bypass, or surgery on an artery in your head, neck, heart, or legs? No   Do you have chest pain with activity? No   Do you have a history of heart failure? No   Do you currently have a cold, bronchitis or symptoms of other infection? No   Do you have a cough, shortness of breath, or wheezing? No   Do you or anyone in your family have previous history of blood clots? No   Do you or does anyone in your family have a serious bleeding problem such as prolonged bleeding following surgeries or cuts? No   Have you ever had problems with anemia or been told to take iron pills? No   Have you had any abnormal blood loss such as black, tarry or bloody stools? No   Have you ever had a blood transfusion? No   Are you willing to have a blood transfusion if it is medically needed before, during, or after your surgery? Yes   Have you or any of your relatives ever had problems with anesthesia? No   Do you have sleep apnea, excessive snoring or daytime drowsiness? (!) YES   Do you have a CPAP machine? Yes   Do you have any artifical heart valves or other implanted medical devices like a pacemaker, defibrillator, or continuous glucose monitor? No   Do you have artificial joints? No   Are you allergic to latex? No     Advance Care Planning        Preoperative Review of    reviewed - controlled substances reflected in medication list.      Status of Chronic Conditions:  See problem list for active medical problems.  Problems all longstanding and stable, except as  noted/documented.  See ROS for pertinent symptoms related to these conditions.    ASTHMA - Patient has a history of well-controlled asthma    HYPERLIPIDEMIA - Patient has a long history of significant Hyperlipidemia requiring medication for treatment with recent good control. Patient reports no problems or side effects with the medication.     Patient Active Problem List    Diagnosis Date Noted    Non-recurrent unilateral inguinal hernia without obstruction or gangrene 03/12/2025     Priority: Medium    Nontraumatic complete tear of left rotator cuff 08/13/2024     Priority: Medium    Overweight with body mass index (BMI) of 29 to 29.9 in adult 08/03/2022     Priority: Medium    Nasal septal perforation 10/28/2021     Priority: Medium    Impingement syndrome of shoulder region, left 11/10/2020     Priority: Medium    Moderate persistent asthma without complication 01/14/2016     Priority: Medium    Restless legs syndrome (RLS) 12/02/2015     Priority: Medium    Gastroesophageal reflux disease without esophagitis      Priority: Medium    Erectile dysfunction, unspecified erectile dysfunction type 11/16/2015     Priority: Medium    DIAMOND (obstructive sleep apnea)- moderate (AHI 17) 11/06/2015     Priority: Medium     ve      Hyperlipidemia LDL goal <130 11/06/2015     Priority: Medium    Allergic rhinitis, unspecified allergic rhinitis type 11/06/2015     Priority: Medium    Male hypogonadism 11/06/2015     Priority: Medium      Past Medical History:   Diagnosis Date    Anal fistula     Impingement syndrome of left shoulder 11/10/2020    Kidney stones 2016 2016 One episode    Low back pain 08/23/2017    Herniated disk    Uncomplicated asthma      Past Surgical History:   Procedure Laterality Date    COLONOSCOPY N/A 12/16/2014    Procedure: COLONOSCOPY;  Surgeon: Raz Ortiz MD;  Location: [facility] OR    EXAM UNDER ANESTHESIA, FISTULOTOMY RECTUM, COMBINED N/A 12/16/2014    Procedure: COMBINED EXAM UNDER  ANESTHESIA, FISTULOTOMY RECTUM;  Surgeon: Raz Ortiz MD;  Location: [facility]       Current Outpatient Medications   Medication Sig Dispense Refill    atorvastatin (LIPITOR) 80 MG tablet TAKE ONE TABLET BY MOUTH ONCE DAILY 90 tablet 1    Cholecalciferol (VITAMIN D) 2000 UNITS tablet Take 2,000 Units by mouth      famotidine (PEPCID) 40 MG tablet TAKE ONE TABLET BY MOUTH ONCE DAILY 90 tablet 1    gabapentin (NEURONTIN) 300 MG capsule Take 1 capsule (300 mg) by mouth at bedtime. 90 capsule 0    montelukast (SINGULAIR) 10 MG tablet TAKE ONE TABLET BY MOUTH AT BEDTIME 90 tablet 1    Multiple Vitamins-Minerals (MULTIVITAMIN OR)       order for DME Resmed Airsense 10 auto cpap 10-14 cm, Mirage Fx nasal mask std      rOPINIRole (REQUIP) 2 MG tablet TAKE TWO TABLETS BY MOUTH DAILY AT BEDTIME 180 tablet 3    Semaglutide-Weight Management (WEGOVY) 1.7 MG/0.75ML pen Inject 1.7 mg subcutaneously every 7 days. 9 mL 1    tadalafil (CIALIS) 5 MG tablet TAKE ONE TABLET BY MOUTH ONCE DAILY 90 tablet 1    tamsulosin (FLOMAX) 0.4 MG capsule Take 2 capsules (0.8 mg) by mouth daily. 180 capsule 1       Allergies   Allergen Reactions    Codeine Nausea and Vomiting        Social History     Tobacco Use    Smoking status: Never     Passive exposure: Never    Smokeless tobacco: Never    Tobacco comments:     no 2nd hand smoke exposure   Substance Use Topics    Alcohol use: Yes     Alcohol/week: 3.0 - 4.0 standard drinks of alcohol     Comment: 3-4 drinks weekly     Family History   Problem Relation Age of Onset    Crohn's Disease Brother     Depression Brother     Prostate Cancer Father     Diabetes Father 80    Breast Cancer Sister     Breast Cancer Mother     Thyroid Disease Brother     Anesthesia Reaction No family hx of     Colon Polyps No family hx of     Ulcerative Colitis No family hx of     Cancer - colorectal No family hx of     Coronary Artery Disease No family hx of      History   Drug  "Use Unknown             Review of Systems  CONSTITUTIONAL: NEGATIVE for fever, chills, change in weight  INTEGUMENTARY/SKIN: NEGATIVE for worrisome rashes, moles or lesions  EYES: NEGATIVE for vision changes or irritation  ENT/MOUTH: NEGATIVE for ear, mouth and throat problems  RESP: NEGATIVE for significant cough or SOB  BREAST: NEGATIVE for masses, tenderness or discharge  CV: NEGATIVE for chest pain, palpitations or peripheral edema  GI: NEGATIVE for nausea, abdominal pain, heartburn, or change in bowel habits  : NEGATIVE for frequency, dysuria, or hematuria  MUSCULOSKELETAL: NEGATIVE for significant arthralgias or myalgia  NEURO: NEGATIVE for weakness, dizziness or paresthesias  ENDOCRINE: NEGATIVE for temperature intolerance, skin/hair changes  HEME: NEGATIVE for bleeding problems  PSYCHIATRIC: NEGATIVE for changes in mood or affect    Objective    /74 (BP Location: Right arm, Patient Position: Sitting, Cuff Size: Adult Large)   Pulse 76   Temp 97.8  F (36.6  C) (Oral)   Resp 16   Ht 1.727 m (5' 8\")   Wt 91.6 kg (202 lb)   SpO2 92%   BMI 30.71 kg/m     Estimated body mass index is 30.71 kg/m  as calculated from the following:    Height as of this encounter: 1.727 m (5' 8\").    Weight as of this encounter: 91.6 kg (202 lb).  Physical Exam  GENERAL: alert and no distress  EYES: Eyes grossly normal to inspection, PERRL and conjunctivae and sclerae normal  HENT: ear canals and TM's normal, nose and mouth without ulcers or lesions  NECK: no adenopathy, no asymmetry, masses, or scars  RESP: lungs clear to auscultation - no rales, rhonchi or wheezes  CV: regular rate and rhythm, normal S1 S2, no S3 or S4, no murmur, click or rub, no peripheral edema  ABDOMEN: soft, nontender, no hepatosplenomegaly, no masses and bowel sounds normal  MS: no gross musculoskeletal defects noted, no edema  SKIN: no suspicious lesions or rashes  NEURO: Normal strength and tone, mentation intact and speech normal  PSYCH: " mentation appears normal, affect normal/bright    Recent Labs   Lab Test 08/13/24  1459      POTASSIUM 4.2   CR 1.07        Diagnostics  Recent lab work on file  No EKG indicated    Revised Cardiac Risk Index (RCRI)  The patient has the following serious cardiovascular risks for perioperative complications:   - No serious cardiac risks = 0 points     RCRI Interpretation: 0 points: Class I (very low risk - 0.4% complication rate)         Signed Electronically by: Kelli Porter MD  A copy of this evaluation report is provided to the requesting physician.

## 2025-05-21 NOTE — PATIENT INSTRUCTIONS

## 2025-05-21 NOTE — H&P (VIEW-ONLY)
Preoperative Evaluation  [address] [address]  Phone: [phone]  Primary Provider: Kelli Porter MD  Pre-op Performing Provider: Kelli Porter MD  May 21, 2025             5/20/2025   Surgical Information   What procedure is being done? Hernia repair   Facility or Hospital where procedure/surgery will be performed: [facility]   Who is doing the procedure / surgery? Isaak Choudhury   Date of surgery / procedure: 6/4/2025   Time of surgery / procedure: 7:30am   Where do you plan to recover after surgery? at home with family     Fax number for surgical facility: Note does not need to be faxed, will be available electronically in Epic.    Assessment & Plan     The proposed surgical procedure is considered INTERMEDIATE risk.    Preop general physical exam    Non-recurrent unilateral inguinal hernia without obstruction or gangrene  Right inguinal hernia    S/P appendectomy  Had laparoscopic appendectomy done couple of weeks ago but should be healed up in time for procedure.    Hyperlipidemia LDL goal <130    Overweight with body mass index (BMI) of 29 to 29.9 in adult  On semaglutide    Moderate persistent asthma without complication  Controlled    Male hypogonadism            - No identified additional risk factors other than previously addressed    Preoperative Medication Instructions  Antiplatelet or Anticoagulation Medication Instructions   - We reviewed the medication list and the patient is not on an antiplatelet or anticoagulation medications.    Additional Medication Instructions  Take all scheduled medications on the day of surgery EXCEPT for modifications listed below:   - GLP-1 Injectable (exenitide, liraglutide, semaglutide, dulaglutide, etc.): DO NOT TAKE 7 days before surgery     Recommendation  Approval given to proceed with proposed procedure, without further diagnostic evaluation.        Sajan STYLES is a Carlos  year old, presenting for the following:  Physical          5/21/2025     8:33 AM   Additional Questions   Roomed by Abbey SALEH:   Right inguinal hernia.  Planned repair.  Recently had acute appendicitis which required appendectomy but has healed up nicely from that.          5/20/2025   Pre-Op Questionnaire   Have you ever had a heart attack or stroke? No   Have you ever had surgery on your heart or blood vessels, such as a stent placement, a coronary artery bypass, or surgery on an artery in your head, neck, heart, or legs? No   Do you have chest pain with activity? No   Do you have a history of heart failure? No   Do you currently have a cold, bronchitis or symptoms of other infection? No   Do you have a cough, shortness of breath, or wheezing? No   Do you or anyone in your family have previous history of blood clots? No   Do you or does anyone in your family have a serious bleeding problem such as prolonged bleeding following surgeries or cuts? No   Have you ever had problems with anemia or been told to take iron pills? No   Have you had any abnormal blood loss such as black, tarry or bloody stools? No   Have you ever had a blood transfusion? No   Are you willing to have a blood transfusion if it is medically needed before, during, or after your surgery? Yes   Have you or any of your relatives ever had problems with anesthesia? No   Do you have sleep apnea, excessive snoring or daytime drowsiness? (!) YES   Do you have a CPAP machine? Yes   Do you have any artifical heart valves or other implanted medical devices like a pacemaker, defibrillator, or continuous glucose monitor? No   Do you have artificial joints? No   Are you allergic to latex? No     Advance Care Planning        Preoperative Review of    reviewed - controlled substances reflected in medication list.      Status of Chronic Conditions:  See problem list for active medical problems.  Problems all longstanding and stable, except as  noted/documented.  See ROS for pertinent symptoms related to these conditions.    ASTHMA - Patient has a history of well-controlled asthma    HYPERLIPIDEMIA - Patient has a long history of significant Hyperlipidemia requiring medication for treatment with recent good control. Patient reports no problems or side effects with the medication.     Patient Active Problem List    Diagnosis Date Noted    Non-recurrent unilateral inguinal hernia without obstruction or gangrene 03/12/2025     Priority: Medium    Nontraumatic complete tear of left rotator cuff 08/13/2024     Priority: Medium    Overweight with body mass index (BMI) of 29 to 29.9 in adult 08/03/2022     Priority: Medium    Nasal septal perforation 10/28/2021     Priority: Medium    Impingement syndrome of shoulder region, left 11/10/2020     Priority: Medium    Moderate persistent asthma without complication 01/14/2016     Priority: Medium    Restless legs syndrome (RLS) 12/02/2015     Priority: Medium    Gastroesophageal reflux disease without esophagitis      Priority: Medium    Erectile dysfunction, unspecified erectile dysfunction type 11/16/2015     Priority: Medium    DIAMOND (obstructive sleep apnea)- moderate (AHI 17) 11/06/2015     Priority: Medium     ve      Hyperlipidemia LDL goal <130 11/06/2015     Priority: Medium    Allergic rhinitis, unspecified allergic rhinitis type 11/06/2015     Priority: Medium    Male hypogonadism 11/06/2015     Priority: Medium      Past Medical History:   Diagnosis Date    Anal fistula     Impingement syndrome of left shoulder 11/10/2020    Kidney stones 2016 2016 One episode    Low back pain 08/23/2017    Herniated disk    Uncomplicated asthma      Past Surgical History:   Procedure Laterality Date    COLONOSCOPY N/A 12/16/2014    Procedure: COLONOSCOPY;  Surgeon: Raz Ortiz MD;  Location: [facility] OR    EXAM UNDER ANESTHESIA, FISTULOTOMY RECTUM, COMBINED N/A 12/16/2014    Procedure: COMBINED EXAM UNDER  ANESTHESIA, FISTULOTOMY RECTUM;  Surgeon: Raz Ortiz MD;  Location: [facility]       Current Outpatient Medications   Medication Sig Dispense Refill    atorvastatin (LIPITOR) 80 MG tablet TAKE ONE TABLET BY MOUTH ONCE DAILY 90 tablet 1    Cholecalciferol (VITAMIN D) 2000 UNITS tablet Take 2,000 Units by mouth      famotidine (PEPCID) 40 MG tablet TAKE ONE TABLET BY MOUTH ONCE DAILY 90 tablet 1    gabapentin (NEURONTIN) 300 MG capsule Take 1 capsule (300 mg) by mouth at bedtime. 90 capsule 0    montelukast (SINGULAIR) 10 MG tablet TAKE ONE TABLET BY MOUTH AT BEDTIME 90 tablet 1    Multiple Vitamins-Minerals (MULTIVITAMIN OR)       order for DME Resmed Airsense 10 auto cpap 10-14 cm, Mirage Fx nasal mask std      rOPINIRole (REQUIP) 2 MG tablet TAKE TWO TABLETS BY MOUTH DAILY AT BEDTIME 180 tablet 3    Semaglutide-Weight Management (WEGOVY) 1.7 MG/0.75ML pen Inject 1.7 mg subcutaneously every 7 days. 9 mL 1    tadalafil (CIALIS) 5 MG tablet TAKE ONE TABLET BY MOUTH ONCE DAILY 90 tablet 1    tamsulosin (FLOMAX) 0.4 MG capsule Take 2 capsules (0.8 mg) by mouth daily. 180 capsule 1       Allergies   Allergen Reactions    Codeine Nausea and Vomiting        Social History     Tobacco Use    Smoking status: Never     Passive exposure: Never    Smokeless tobacco: Never    Tobacco comments:     no 2nd hand smoke exposure   Substance Use Topics    Alcohol use: Yes     Alcohol/week: 3.0 - 4.0 standard drinks of alcohol     Comment: 3-4 drinks weekly     Family History   Problem Relation Age of Onset    Crohn's Disease Brother     Depression Brother     Prostate Cancer Father     Diabetes Father 80    Breast Cancer Sister     Breast Cancer Mother     Thyroid Disease Brother     Anesthesia Reaction No family hx of     Colon Polyps No family hx of     Ulcerative Colitis No family hx of     Cancer - colorectal No family hx of     Coronary Artery Disease No family hx of      History   Drug  "Use Unknown             Review of Systems  CONSTITUTIONAL: NEGATIVE for fever, chills, change in weight  INTEGUMENTARY/SKIN: NEGATIVE for worrisome rashes, moles or lesions  EYES: NEGATIVE for vision changes or irritation  ENT/MOUTH: NEGATIVE for ear, mouth and throat problems  RESP: NEGATIVE for significant cough or SOB  BREAST: NEGATIVE for masses, tenderness or discharge  CV: NEGATIVE for chest pain, palpitations or peripheral edema  GI: NEGATIVE for nausea, abdominal pain, heartburn, or change in bowel habits  : NEGATIVE for frequency, dysuria, or hematuria  MUSCULOSKELETAL: NEGATIVE for significant arthralgias or myalgia  NEURO: NEGATIVE for weakness, dizziness or paresthesias  ENDOCRINE: NEGATIVE for temperature intolerance, skin/hair changes  HEME: NEGATIVE for bleeding problems  PSYCHIATRIC: NEGATIVE for changes in mood or affect    Objective    /74 (BP Location: Right arm, Patient Position: Sitting, Cuff Size: Adult Large)   Pulse 76   Temp 97.8  F (36.6  C) (Oral)   Resp 16   Ht 1.727 m (5' 8\")   Wt 91.6 kg (202 lb)   SpO2 92%   BMI 30.71 kg/m     Estimated body mass index is 30.71 kg/m  as calculated from the following:    Height as of this encounter: 1.727 m (5' 8\").    Weight as of this encounter: 91.6 kg (202 lb).  Physical Exam  GENERAL: alert and no distress  EYES: Eyes grossly normal to inspection, PERRL and conjunctivae and sclerae normal  HENT: ear canals and TM's normal, nose and mouth without ulcers or lesions  NECK: no adenopathy, no asymmetry, masses, or scars  RESP: lungs clear to auscultation - no rales, rhonchi or wheezes  CV: regular rate and rhythm, normal S1 S2, no S3 or S4, no murmur, click or rub, no peripheral edema  ABDOMEN: soft, nontender, no hepatosplenomegaly, no masses and bowel sounds normal  MS: no gross musculoskeletal defects noted, no edema  SKIN: no suspicious lesions or rashes  NEURO: Normal strength and tone, mentation intact and speech normal  PSYCH: " mentation appears normal, affect normal/bright    Recent Labs   Lab Test 08/13/24  1459      POTASSIUM 4.2   CR 1.07        Diagnostics  Recent lab work on file  No EKG indicated    Revised Cardiac Risk Index (RCRI)  The patient has the following serious cardiovascular risks for perioperative complications:   - No serious cardiac risks = 0 points     RCRI Interpretation: 0 points: Class I (very low risk - 0.4% complication rate)         Signed Electronically by: Kelli Porter MD  A copy of this evaluation report is provided to the requesting physician.

## 2025-05-28 ENCOUNTER — OFFICE VISIT (OUTPATIENT)
Dept: SURGERY | Facility: CLINIC | Age: 60
End: 2025-05-28
Payer: COMMERCIAL

## 2025-05-28 VITALS
WEIGHT: 203 LBS | BODY MASS INDEX: 30.07 KG/M2 | HEIGHT: 69 IN | OXYGEN SATURATION: 96 % | SYSTOLIC BLOOD PRESSURE: 139 MMHG | DIASTOLIC BLOOD PRESSURE: 87 MMHG | HEART RATE: 72 BPM

## 2025-05-28 DIAGNOSIS — E66.3 OVERWEIGHT WITH BODY MASS INDEX (BMI) OF 29 TO 29.9 IN ADULT: Primary | ICD-10-CM

## 2025-05-28 DIAGNOSIS — G47.33 OSA (OBSTRUCTIVE SLEEP APNEA): Chronic | ICD-10-CM

## 2025-05-28 DIAGNOSIS — Z86.39 HISTORY OF OBESITY: ICD-10-CM

## 2025-05-28 PROCEDURE — 3075F SYST BP GE 130 - 139MM HG: CPT | Performed by: PHYSICIAN ASSISTANT

## 2025-05-28 PROCEDURE — G2211 COMPLEX E/M VISIT ADD ON: HCPCS | Performed by: PHYSICIAN ASSISTANT

## 2025-05-28 PROCEDURE — 3079F DIAST BP 80-89 MM HG: CPT | Performed by: PHYSICIAN ASSISTANT

## 2025-05-28 PROCEDURE — 99213 OFFICE O/P EST LOW 20 MIN: CPT | Performed by: PHYSICIAN ASSISTANT

## 2025-05-28 RX ORDER — SEMAGLUTIDE 2.4 MG/.75ML
2.4 INJECTION, SOLUTION SUBCUTANEOUS
Qty: 9 ML | Refills: 0 | Status: SHIPPED | OUTPATIENT
Start: 2025-05-28

## 2025-05-28 RX ORDER — SEMAGLUTIDE 2.4 MG/.75ML
2.4 INJECTION, SOLUTION SUBCUTANEOUS
Qty: 3 ML | Refills: 0 | Status: SHIPPED | OUTPATIENT
Start: 2025-05-28 | End: 2025-05-28

## 2025-05-28 NOTE — PATIENT INSTRUCTIONS
"Nice to talk with you today! Thank you for allowing me the privilege of caring for you.   We hope we provided you with the excellent service you deserve.     To ensure the quality of our services you may receive a patient satisfaction survey from an independent monitoring company.  The greatest compliment you can give is \"Likely to Recommend\"      Below is our plan we discussed.-  MUNDO Muse      Continue wegovy    Please schedule a follow up with Micha Arroyo PA-C in 3 months.  If you need to reach me sooner you can do so by calling [phone].    Have a great day!     Eat Better ? Move More ? Live Well    Eat 3 nutrient-rich meals each day    Don t skip meals--it will cause you to overeat later in the day!    Eating fiber (vegetables/fruits/whole grains) and protein with meals helps you stay full longer    Choose foods with less than 10 grams of sugar and 5 grams of fat per serving to prevent excess calories and weight gain  Eat around the same times each day to develop a routine eating schedule   Avoid snacking unless physically hungry.   Planned snacks: 1-2 times per day and no more than 150 calories    Eat protein first   Protein helps with healing, maintaining adequate muscle mass, reducing hunger and optimizing nutritional status   Aim for 60-80 grams of protein per day   Fill up on Fiber   Fiber comes from plants--fruits, veggies, whole grains, nuts/seeds and beans   Fiber is low in calories, high in phytonutrients and helps you stay full longer   Aim for 25-35 grams per day by eating fiber with meals and snacks  Eat S-L-O-W-L-Y   Take 20-30 minutes to eat each meal by taking small bites, chewing foods to applesauce consistency or 20-30 times before you swallow   Eating foods too fast can delay satiety/fullness signals and increase overeating   Slow down your eating by using toddler utensils, putting your fork/spoon down between bites and not watching TV or emailing during meals!   Keep a Journal         " Writing down what you eat, how you feel and when you are active helps you identify new changes to work on from week to week         Look for ways to cut 100 calories from your current diet 2-3 times per day  Drink 64 ounces of 0-Calorie drinks between meals   Water   Zero calorie Propel  or Vitamin Water     [employer]  Zero Calories   Crystal Light , Sugar-Free Joselito-Aid , and other sugar-free lemonade or flavored coleman   Keep Caffeine to less than 300mg per day ie: 3-6oz cups coffee     Work up to 45-60 minutes of physical activity most days of the week   Helps with losing weight and prevent regaining those extra pounds!    Do a combo of cardio (walking/water exercises) and strength training (lifting weights/Vinyasa yoga)    Avoid Mindless Eating   Be present when you eat--take note of the smell, taste and quality of your food   Make a list of alternative activities you could do to prevent eating out of boredom/stress  Go for a walk, call a friend, chew gum, paint your nails, re-organize the garage, etc

## 2025-05-29 ENCOUNTER — MYC MEDICAL ADVICE (OUTPATIENT)
Dept: SURGERY | Facility: AMBULATORY SURGERY CENTER | Age: 60
End: 2025-05-29
Payer: COMMERCIAL

## 2025-05-29 NOTE — TELEPHONE ENCOUNTER
Both forms printed and signed. Patient has planned for  3 weeks off of work per FMLA form so did not add restrictions at this time (off until post op appointment). Can add if patient chooses to shorten leave time which would require new paperwork or letter.     Forms placed on Dr. Choudhury's desk for him to sign when he is back in clinic on Monday.     MARIMAR LoveN, RN, PHN 5/29/2025 1:10 PM

## 2025-06-03 ENCOUNTER — ANESTHESIA EVENT (OUTPATIENT)
Dept: SURGERY | Facility: AMBULATORY SURGERY CENTER | Age: 60
End: 2025-06-03
Payer: COMMERCIAL

## 2025-06-03 NOTE — ANESTHESIA PREPROCEDURE EVALUATION
Anesthesia Pre-Procedure Evaluation    Patient: Juan Treadwell   MRN: [id number] : 1965          Procedure : Procedure(s):  HERNIORRHAPHY, INGUINAL, ROBOT-ASSISTED, LAPAROSCOPIC, USING DA KELLY XI right possible bilateral         Past Medical History:   Diagnosis Date    Anal fistula     Impingement syndrome of left shoulder 11/10/2020    Kidney stones 2016 One episode    Low back pain 2017    Herniated disk    Uncomplicated asthma       Past Surgical History:   Procedure Laterality Date    COLONOSCOPY N/A 2014    Procedure: COLONOSCOPY;  Surgeon: Raz Ortiz MD;  Location: [facility] OR    EXAM UNDER ANESTHESIA, FISTULOTOMY RECTUM, COMBINED N/A 2014    Procedure: COMBINED EXAM UNDER ANESTHESIA, FISTULOTOMY RECTUM;  Surgeon: Raz Ortiz MD;  Location: [facility] OR    [facility]        Allergies   Allergen Reactions    Codeine Nausea and Vomiting      Social History     Tobacco Use    Smoking status: Never     Passive exposure: Never    Smokeless tobacco: Never    Tobacco comments:     no 2nd hand smoke exposure   Substance Use Topics    Alcohol use: Yes     Alcohol/week: 3.0 - 4.0 standard drinks of alcohol     Comment: 3-4 drinks weekly      Wt Readings from Last 1 Encounters:   25 92.1 kg (203 lb)             Physical Exam  Airway  Mallampati: I  TM distance: >3 FB  Neck ROM: full  Mouth opening: >= 4 cm    Cardiovascular - normal exam   Dental   (+) Minor Abnormalities - some fillings, tiny chips      Pulmonary - normal exam      Neurological - normal exam  He appears awake, alert and oriented x3.    Other Findings       OUTSIDE LABS:  CBC:   Lab Results   Component Value Date    WBC 7.9 2019    WBC 11.3 (H) 2016    HGB 14.6 2019    HGB 14.3 2016    HCT 43.3 2019    HCT 41.0 2016     2019     2016     BMP:   Lab Results   Component Value Date     2024      "12/21/2023    POTASSIUM 4.2 08/13/2024    POTASSIUM 4.7 12/21/2023    CHLORIDE 103 08/13/2024    CHLORIDE 101 12/21/2023    CO2 26 08/13/2024    CO2 23 12/21/2023    BUN 11.9 08/13/2024    BUN 15.2 12/21/2023    CR 1.07 08/13/2024    CR 1.03 12/21/2023    GLC 86 08/13/2024     (H) 12/21/2023     COAGS: No results found for: \"PTT\", \"INR\", \"FIBR\"  POC: No results found for: \"BGM\", \"HCG\", \"HCGS\"  HEPATIC:   Lab Results   Component Value Date    ALBUMIN 4.4 08/13/2024    PROTTOTAL 7.0 08/13/2024    ALT 25 08/13/2024    AST 22 08/13/2024    ALKPHOS 98 08/13/2024    BILITOTAL 0.4 08/13/2024     OTHER:   Lab Results   Component Value Date    A1C 5.6 06/29/2022    NAUN 9.4 08/13/2024    TSH 2.20 12/11/2019       Anesthesia Plan    ASA Status:  2      NPO Status: NPO Appropriate   Anesthesia Type: General.  Airway: oral.  Induction: intravenous.  Maintenance: Balanced.   Techniques and Equipment:       - Monitoring Plan: standard ASA monitoring     Consents    Anesthesia Plan(s) and associated risks, benefits, and realistic alternatives discussed. Questions answered and patient/representative(s) expressed understanding.     - Discussed:     - Discussed with:  Patient        - Pt is DNR/DNI Status: no DNR          Postoperative Care    Pain management: plan for postoperative opioid use, multimodal analgesia.     Comments:                   Rashaad Dumont MD    I have reviewed the pertinent notes and labs in the chart from the past 30 days and (re)examined the patient.  Any updates or changes from those notes are reflected in this note.    Clinically Significant Risk Factors Present on Admission                             # Overweight: Estimated body mass index is 29.98 kg/m  as calculated from the following:    Height as of 5/28/25: 1.753 m (5' 9\").    Weight as of 5/28/25: 92.1 kg (203 lb).       # Asthma: noted on problem list              "

## 2025-06-04 ENCOUNTER — ANESTHESIA (OUTPATIENT)
Dept: SURGERY | Facility: AMBULATORY SURGERY CENTER | Age: 60
End: 2025-06-04
Payer: COMMERCIAL

## 2025-06-04 ENCOUNTER — HOSPITAL ENCOUNTER (OUTPATIENT)
Facility: AMBULATORY SURGERY CENTER | Age: 60
Discharge: HOME | End: 2025-06-04
Attending: SURGERY
Payer: COMMERCIAL

## 2025-06-04 VITALS
BODY MASS INDEX: 30.07 KG/M2 | TEMPERATURE: 97.3 F | HEIGHT: 69 IN | DIASTOLIC BLOOD PRESSURE: 64 MMHG | SYSTOLIC BLOOD PRESSURE: 111 MMHG | HEART RATE: 72 BPM | WEIGHT: 203 LBS | OXYGEN SATURATION: 99 % | RESPIRATION RATE: 17 BRPM

## 2025-06-04 DIAGNOSIS — K40.90 NON-RECURRENT UNILATERAL INGUINAL HERNIA WITHOUT OBSTRUCTION OR GANGRENE: Primary | ICD-10-CM

## 2025-06-04 DEVICE — MESH SURGICAL INGUINAL HRN REP MACROPOR 15CMX15CM  PPM1515X3: Type: IMPLANTABLE DEVICE | Site: ABDOMEN | Status: FUNCTIONAL

## 2025-06-04 RX ORDER — DIPHENHYDRAMINE HCL 25 MG
25 CAPSULE ORAL EVERY 6 HOURS PRN
Status: DISCONTINUED | OUTPATIENT
Start: 2025-06-04 | End: 2025-06-05 | Stop reason: HOSPADM

## 2025-06-04 RX ORDER — HYDRALAZINE HYDROCHLORIDE 20 MG/ML
2.5-5 INJECTION INTRAMUSCULAR; INTRAVENOUS EVERY 10 MIN PRN
Status: DISCONTINUED | OUTPATIENT
Start: 2025-06-04 | End: 2025-06-05 | Stop reason: HOSPADM

## 2025-06-04 RX ORDER — OXYCODONE HYDROCHLORIDE 5 MG/1
5 TABLET ORAL
Status: COMPLETED | OUTPATIENT
Start: 2025-06-04 | End: 2025-06-04

## 2025-06-04 RX ORDER — NALOXONE HYDROCHLORIDE 0.4 MG/ML
0.1 INJECTION, SOLUTION INTRAMUSCULAR; INTRAVENOUS; SUBCUTANEOUS
Status: DISCONTINUED | OUTPATIENT
Start: 2025-06-04 | End: 2025-06-05 | Stop reason: HOSPADM

## 2025-06-04 RX ORDER — ONDANSETRON 2 MG/ML
4 INJECTION INTRAMUSCULAR; INTRAVENOUS EVERY 30 MIN PRN
Status: DISCONTINUED | OUTPATIENT
Start: 2025-06-04 | End: 2025-06-05 | Stop reason: HOSPADM

## 2025-06-04 RX ORDER — ACETAMINOPHEN 325 MG/1
975 TABLET ORAL ONCE
Status: COMPLETED | OUTPATIENT
Start: 2025-06-04 | End: 2025-06-04

## 2025-06-04 RX ORDER — ONDANSETRON 2 MG/ML
INJECTION INTRAMUSCULAR; INTRAVENOUS PRN
Status: DISCONTINUED | OUTPATIENT
Start: 2025-06-04 | End: 2025-06-04

## 2025-06-04 RX ORDER — SODIUM CHLORIDE, SODIUM LACTATE, POTASSIUM CHLORIDE, CALCIUM CHLORIDE 600; 310; 30; 20 MG/100ML; MG/100ML; MG/100ML; MG/100ML
INJECTION, SOLUTION INTRAVENOUS CONTINUOUS
Status: DISCONTINUED | OUTPATIENT
Start: 2025-06-04 | End: 2025-06-05 | Stop reason: HOSPADM

## 2025-06-04 RX ORDER — PROPOFOL 10 MG/ML
INJECTION, EMULSION INTRAVENOUS CONTINUOUS PRN
Status: DISCONTINUED | OUTPATIENT
Start: 2025-06-04 | End: 2025-06-04

## 2025-06-04 RX ORDER — DEXAMETHASONE SODIUM PHOSPHATE 10 MG/ML
4 INJECTION, SOLUTION INTRAMUSCULAR; INTRAVENOUS
Status: DISCONTINUED | OUTPATIENT
Start: 2025-06-04 | End: 2025-06-05 | Stop reason: HOSPADM

## 2025-06-04 RX ORDER — FENTANYL CITRATE 50 UG/ML
25 INJECTION, SOLUTION INTRAMUSCULAR; INTRAVENOUS
Status: DISCONTINUED | OUTPATIENT
Start: 2025-06-04 | End: 2025-06-05 | Stop reason: HOSPADM

## 2025-06-04 RX ORDER — FENTANYL CITRATE 50 UG/ML
50 INJECTION, SOLUTION INTRAMUSCULAR; INTRAVENOUS EVERY 5 MIN PRN
Status: DISCONTINUED | OUTPATIENT
Start: 2025-06-04 | End: 2025-06-05 | Stop reason: HOSPADM

## 2025-06-04 RX ORDER — OXYCODONE HYDROCHLORIDE 5 MG/1
5 TABLET ORAL
Status: DISCONTINUED | OUTPATIENT
Start: 2025-06-04 | End: 2025-06-05 | Stop reason: HOSPADM

## 2025-06-04 RX ORDER — DEXAMETHASONE SODIUM PHOSPHATE 4 MG/ML
INJECTION, SOLUTION INTRA-ARTICULAR; INTRALESIONAL; INTRAMUSCULAR; INTRAVENOUS; SOFT TISSUE PRN
Status: DISCONTINUED | OUTPATIENT
Start: 2025-06-04 | End: 2025-06-04

## 2025-06-04 RX ORDER — CEFAZOLIN SODIUM 2 G/50ML
2 SOLUTION INTRAVENOUS SEE ADMIN INSTRUCTIONS
Status: DISCONTINUED | OUTPATIENT
Start: 2025-06-04 | End: 2025-06-05 | Stop reason: HOSPADM

## 2025-06-04 RX ORDER — FENTANYL CITRATE 50 UG/ML
25 INJECTION, SOLUTION INTRAMUSCULAR; INTRAVENOUS EVERY 5 MIN PRN
Status: DISCONTINUED | OUTPATIENT
Start: 2025-06-04 | End: 2025-06-05 | Stop reason: HOSPADM

## 2025-06-04 RX ORDER — LABETALOL HYDROCHLORIDE 5 MG/ML
10 INJECTION, SOLUTION INTRAVENOUS
Status: DISCONTINUED | OUTPATIENT
Start: 2025-06-04 | End: 2025-06-05 | Stop reason: HOSPADM

## 2025-06-04 RX ORDER — PROPOFOL 10 MG/ML
INJECTION, EMULSION INTRAVENOUS PRN
Status: DISCONTINUED | OUTPATIENT
Start: 2025-06-04 | End: 2025-06-04

## 2025-06-04 RX ORDER — ONDANSETRON 4 MG/1
4 TABLET, ORALLY DISINTEGRATING ORAL EVERY 30 MIN PRN
Status: DISCONTINUED | OUTPATIENT
Start: 2025-06-04 | End: 2025-06-05 | Stop reason: HOSPADM

## 2025-06-04 RX ORDER — OXYCODONE HYDROCHLORIDE 5 MG/1
10 TABLET ORAL
Status: DISCONTINUED | OUTPATIENT
Start: 2025-06-04 | End: 2025-06-05 | Stop reason: HOSPADM

## 2025-06-04 RX ORDER — LIDOCAINE HYDROCHLORIDE 20 MG/ML
INJECTION, SOLUTION INFILTRATION; PERINEURAL PRN
Status: DISCONTINUED | OUTPATIENT
Start: 2025-06-04 | End: 2025-06-04

## 2025-06-04 RX ORDER — OXYCODONE HYDROCHLORIDE 5 MG/1
5-10 TABLET ORAL EVERY 4 HOURS PRN
Qty: 10 TABLET | Refills: 0 | Status: SHIPPED | OUTPATIENT
Start: 2025-06-04

## 2025-06-04 RX ORDER — BUPIVACAINE HYDROCHLORIDE AND EPINEPHRINE 5; 5 MG/ML; UG/ML
INJECTION, SOLUTION PERINEURAL PRN
Status: DISCONTINUED | OUTPATIENT
Start: 2025-06-04 | End: 2025-06-04 | Stop reason: HOSPADM

## 2025-06-04 RX ORDER — CEFAZOLIN SODIUM 2 G/50ML
2 SOLUTION INTRAVENOUS
Status: COMPLETED | OUTPATIENT
Start: 2025-06-04 | End: 2025-06-04

## 2025-06-04 RX ORDER — ACETAMINOPHEN 325 MG/1
975 TABLET ORAL ONCE
Status: DISCONTINUED | OUTPATIENT
Start: 2025-06-04 | End: 2025-06-05 | Stop reason: HOSPADM

## 2025-06-04 RX ORDER — HYDROMORPHONE HYDROCHLORIDE 1 MG/ML
0.4 INJECTION, SOLUTION INTRAMUSCULAR; INTRAVENOUS; SUBCUTANEOUS EVERY 5 MIN PRN
Status: DISCONTINUED | OUTPATIENT
Start: 2025-06-04 | End: 2025-06-05 | Stop reason: HOSPADM

## 2025-06-04 RX ORDER — HYDROMORPHONE HYDROCHLORIDE 1 MG/ML
0.2 INJECTION, SOLUTION INTRAMUSCULAR; INTRAVENOUS; SUBCUTANEOUS EVERY 5 MIN PRN
Status: DISCONTINUED | OUTPATIENT
Start: 2025-06-04 | End: 2025-06-05 | Stop reason: HOSPADM

## 2025-06-04 RX ORDER — FENTANYL CITRATE 50 UG/ML
INJECTION, SOLUTION INTRAMUSCULAR; INTRAVENOUS PRN
Status: DISCONTINUED | OUTPATIENT
Start: 2025-06-04 | End: 2025-06-04

## 2025-06-04 RX ORDER — MEPERIDINE HYDROCHLORIDE 25 MG/ML
12.5 INJECTION INTRAMUSCULAR; INTRAVENOUS; SUBCUTANEOUS EVERY 5 MIN PRN
Status: DISCONTINUED | OUTPATIENT
Start: 2025-06-04 | End: 2025-06-05 | Stop reason: HOSPADM

## 2025-06-04 RX ORDER — ALBUTEROL SULFATE 0.83 MG/ML
2.5 SOLUTION RESPIRATORY (INHALATION) EVERY 4 HOURS PRN
Status: DISCONTINUED | OUTPATIENT
Start: 2025-06-04 | End: 2025-06-05 | Stop reason: HOSPADM

## 2025-06-04 RX ORDER — DIPHENHYDRAMINE HYDROCHLORIDE 50 MG/ML
25 INJECTION, SOLUTION INTRAMUSCULAR; INTRAVENOUS EVERY 6 HOURS PRN
Status: DISCONTINUED | OUTPATIENT
Start: 2025-06-04 | End: 2025-06-05 | Stop reason: HOSPADM

## 2025-06-04 RX ORDER — KETOROLAC TROMETHAMINE 30 MG/ML
15 INJECTION, SOLUTION INTRAMUSCULAR; INTRAVENOUS
Status: DISCONTINUED | OUTPATIENT
Start: 2025-06-04 | End: 2025-06-05 | Stop reason: HOSPADM

## 2025-06-04 RX ORDER — ONDANSETRON 4 MG/1
4 TABLET, ORALLY DISINTEGRATING ORAL EVERY 6 HOURS PRN
Qty: 10 TABLET | Refills: 0 | Status: SHIPPED | OUTPATIENT
Start: 2025-06-04

## 2025-06-04 RX ORDER — LIDOCAINE 40 MG/G
CREAM TOPICAL
Status: DISCONTINUED | OUTPATIENT
Start: 2025-06-04 | End: 2025-06-05 | Stop reason: HOSPADM

## 2025-06-04 RX ORDER — KETOROLAC TROMETHAMINE 30 MG/ML
INJECTION, SOLUTION INTRAMUSCULAR; INTRAVENOUS PRN
Status: DISCONTINUED | OUTPATIENT
Start: 2025-06-04 | End: 2025-06-04

## 2025-06-04 RX ADMIN — KETOROLAC TROMETHAMINE 15 MG: 30 INJECTION, SOLUTION INTRAMUSCULAR; INTRAVENOUS at 08:23

## 2025-06-04 RX ADMIN — OXYCODONE HYDROCHLORIDE 5 MG: 5 TABLET ORAL at 08:47

## 2025-06-04 RX ADMIN — PROPOFOL 150 MCG/KG/MIN: 10 INJECTION, EMULSION INTRAVENOUS at 07:09

## 2025-06-04 RX ADMIN — FENTANYL CITRATE 50 MCG: 50 INJECTION, SOLUTION INTRAMUSCULAR; INTRAVENOUS at 07:09

## 2025-06-04 RX ADMIN — LIDOCAINE HYDROCHLORIDE 100 MG: 20 INJECTION, SOLUTION INFILTRATION; PERINEURAL at 07:09

## 2025-06-04 RX ADMIN — Medication 50 MG: at 07:11

## 2025-06-04 RX ADMIN — SODIUM CHLORIDE, SODIUM LACTATE, POTASSIUM CHLORIDE, CALCIUM CHLORIDE: 600; 310; 30; 20 INJECTION, SOLUTION INTRAVENOUS at 06:48

## 2025-06-04 RX ADMIN — FENTANYL CITRATE 50 MCG: 50 INJECTION, SOLUTION INTRAMUSCULAR; INTRAVENOUS at 07:30

## 2025-06-04 RX ADMIN — CEFAZOLIN SODIUM 2 G: 2 SOLUTION INTRAVENOUS at 07:02

## 2025-06-04 RX ADMIN — PROPOFOL 150 MG: 10 INJECTION, EMULSION INTRAVENOUS at 07:09

## 2025-06-04 RX ADMIN — DEXAMETHASONE SODIUM PHOSPHATE 4 MG: 4 INJECTION, SOLUTION INTRA-ARTICULAR; INTRALESIONAL; INTRAMUSCULAR; INTRAVENOUS; SOFT TISSUE at 07:17

## 2025-06-04 RX ADMIN — ONDANSETRON 4 MG: 2 INJECTION INTRAMUSCULAR; INTRAVENOUS at 08:23

## 2025-06-04 RX ADMIN — Medication 0.5 MG: at 07:50

## 2025-06-04 RX ADMIN — ACETAMINOPHEN 975 MG: 325 TABLET ORAL at 06:45

## 2025-06-04 NOTE — INTERVAL H&P NOTE
"I have reviewed the surgical (or preoperative) H&P that is linked to this encounter, and examined the patient. There are no significant changes    Clinical Conditions Present on Arrival:  Clinically Significant Risk Factors Present on Admission       # Overweight: Estimated body mass index is 29.96 kg/m  as calculated from the following:    Height as of this encounter: 1.753 m (5' 9.02\").    Weight as of this encounter: 92.1 kg (203 lb).       "

## 2025-06-04 NOTE — ANESTHESIA CARE TRANSFER NOTE
Patient: Juan Treadwell    Procedure: Procedure(s):  HERNIORRHAPHY, INGUINAL, ROBOT-ASSISTED, LAPAROSCOPIC, USING DA KELLY XI right       Diagnosis: Non-recurrent unilateral inguinal hernia without obstruction or gangrene [K40.90]  Diagnosis Additional Information: No value filed.    Anesthesia Type:   General     Note:    Oropharynx: oropharynx clear of all foreign objects and spontaneously breathing  Level of Consciousness: drowsy  Oxygen Supplementation: face mask  Level of Supplemental Oxygen (L/min / FiO2): 6  Independent Airway: airway patency satisfactory and stable  Dentition: dentition unchanged  Vital Signs Stable: post-procedure vital signs reviewed and stable  Report to RN Given: handoff report given  Patient transferred to: PACU    Handoff Report: Identifed the Patient, Identified the Reponsible Provider, Reviewed the pertinent medical history, Discussed the surgical course, Reviewed Intra-OP anesthesia mangement and issues during anesthesia, Set expectations for post-procedure period and Allowed opportunity for questions and acknowledgement of understanding    Vitals:  Vitals Value Taken Time   /61 06/04/25 08:39   Temp 36.6  C (97.88  F) 06/04/25 08:42   Pulse 94 06/04/25 08:42   Resp 9 06/04/25 08:42   SpO2 92 % 06/04/25 08:42   Vitals shown include unfiled device data.    Electronically Signed By: VÍCTOR Fuentes CRNA  June 4, 2025  8:43 AM

## 2025-06-04 NOTE — ANESTHESIA PROCEDURE NOTES
Airway       Patient location during procedure: OR       Procedure Start/Stop Times: 6/4/2025 7:15 AM  Staff -        CRNA: Lou Villalobos APRN CRNA       Performed By: CRNA  Consent for Airway        Urgency: elective  Indications and Patient Condition       Indications for airway management: sarah beth-procedural and airway protection       Induction type:intravenous       Mask difficulty assessment: 2 - vent by mask + OA or adjuvant +/- NMBA    Final Airway Details       Final airway type: endotracheal airway       Successful airway: ETT - single  Endotracheal Airway Details        ETT size (mm): 7.5       Cuffed: yes       Cuff volume (mL): 7       Successful intubation technique: direct laryngoscopy       DL Blade Type: [place] 2       Grade View of Cords: 1       Adjucts: stylet       Position: Right       Measured from: gums/teeth       Secured at (cm): 23       Bite block used: None    Post intubation assessment        Placement verified by: capnometry, equal breath sounds and chest rise        Secured with: tape       Ease of procedure: easy       Dentition: Intact    Medication(s) Administered   Medication Administration Time: 6/4/2025 7:15 AM

## 2025-06-04 NOTE — ANESTHESIA POSTPROCEDURE EVALUATION
Patient: Juan Treadwell    Procedure: Procedure(s):  HERNIORRHAPHY, INGUINAL, ROBOT-ASSISTED, LAPAROSCOPIC, USING DA KELLY XI right       Anesthesia Type:  General    Note:  Disposition: Outpatient   Postop Pain Control: Uneventful            Sign Out: Well controlled pain   PONV: No   Neuro/Psych: Uneventful            Sign Out: Acceptable/Baseline neuro status   Airway/Respiratory: Uneventful            Sign Out: Acceptable/Baseline resp. status   CV/Hemodynamics: Uneventful            Sign Out: Acceptable CV status; No obvious hypovolemia; No obvious fluid overload   Other NRE:    DID A NON-ROUTINE EVENT OCCUR?            Last vitals:  Vitals Value Taken Time   /63 06/04/25 09:00   Temp 36.4  C (97.5  F) 06/04/25 09:00   Pulse 91 06/04/25 09:00   Resp 17 06/04/25 09:00   SpO2 96 % 06/04/25 09:00       Electronically Signed By: Rashaad Dumont MD  June 4, 2025  9:45 AM

## 2025-06-04 NOTE — DISCHARGE INSTRUCTIONS
"[facility]  Home Care Following Anesthesia  For 24 hours after surgery:  Get plenty of rest.  A responsible adult must stay with you for at least 24 hours after you leave the surgery center.  Do not drive or use heavy equipment.  If you have weakness or tingling, don't drive or use heavy equipment until this feeling goes away.   Do not drink alcohol.   Avoid strenuous or risky activities.  Ask for help when climbing stairs.  You may feel lightheaded.  IF so, sit for a few minutes before standing.  Have someone help you get up.   If you have nausea (feel sick to your stomach): Drink only clear liquids such as apple juice, ginger ale, broth or 7-Up.  Rest may also help.  Be sure to drink enough fluids.  Move to a regular diet as you feel able.   You may have a slight fever.  Call the doctor if your fever is over 100 F (37.7 C) (taken under the tongue) or lasts longer than 24 hours.  You may have a dry mouth, a sore throat, muscle aches or trouble sleeping. These should go away after 24 hours.  Do not make important or legal decisions.   It is recommended to avoid smoking.        Today you received a Marcaine or bupivacaine block to numb the nerves near your surgery site.  This is a block using local anesthetic or \"numbing\" medication injected around the nerves to anesthetize or \"numb\" the area supplied by those nerves.  This block is injected into the muscle layer near your surgical site.  The medication may numb the location where you had surgery for 6-18 hours, but may last up to 24 hours.  If your surgical site is an arm or leg you should be careful with your affected limb, since it is possible to injure your limb without being aware of it due to the numbing.  Until full feeling returns, you should guard against bumping or hitting your limb, and avoid extreme hot or cold temperatures on the skin.  As the block wears off, the feeling will return as a tingling or prickly " sensation near your surgical site.  You will experience more discomfort from your incision as the feeling returns.  You may want to take a pain pill (a narcotic or Tylenol if this was prescribed by your surgeon) when you start to experience mild pain before the pain beccomes more severe.  If your pain medications do not control your pain you should notifiy your surgeon.    Tips for taking pain medications  To get the best pain relief possible, remember these points:  Take pain medications as directed, before pain becomes severe.  Pain medication can upset your stomach: taking it with food may help.  Constipation is a common side effect of pain medication. Drink plenty of  fluids.  Eat foods high in fiber. Take a stool softener if recommended by your doctor or pharmacist.  Do not drink alcohol, drive or operate machinery while taking pain medications.  Ask about other ways to control pain, such as with heat, ice or relaxation.    Tylenol/Acetaminophen Consumption    If you feel your pain relief is insufficient, you may take Tylenol/Acetaminophen in addition to your narcotic pain medication.   Be careful not to exceed 4,000 mg of Tylenol/Acetaminophen in a 24 hour period from all sources.  If you are taking extra strength Tylenol/acetaminophen (500 mg), the maximum dose is 8 tablets in 24 hours.  If you are taking regular strength acetaminophen (325 mg), the maximum dose is 12 tablets in 24 hours.    Call a doctor for any of the following:  Signs of infection (fever, growing tenderness at the surgery site, a large amount of drainage or bleeding, severe pain, foul-smelling drainage, redness, swelling).  It has been over 8 to 10 hours since surgery and you are still not able to urinate (pass water).  Headache for over 24 hours.  Numbness, tingling or weakness the day after surgery (if you had spinal anesthesia).  Signs of Covid-19 infection (temperature over 100 degrees, shortness of breath, cough, loss of taste/smell,  generalized body aches, persistent headache, chills, sore throat, nausea/vomiting/diarrhea)    Your doctor is:       Dr. Isaak Choudhury, General Surgery: [phone]               After hours and weekends call the hospital @ [phone] and ask for the resident on call for:  General Surgery  For emergency care, call the:  [facility]:  [phone] (TTY for hearing impaired: [phone])  Tylenol 975 mg given at 645 am.   Ok to take more after 1245 pm.    Toradol 15 mg given at  0820 am.  Do not take any NSAIDs for 4 hours.  OK after 1220 pm.  (Ibuprofen, Advil, Motrin, Naproxen, Aleve).      How to Relieve Pain After Your Robotic or Laparoscopic Abdominal Surgery    After your surgery, you may have different types of pain.  It's normal to have pain near your incisions, but you may also feel bloated or have pain in different areas of your body, especially your shoulders.  This is from the gas that was put into your abdomen during your surgery.  The gas makes room for your surgeon to see, but it also puts pressure on the inside of your abdomen and can move to other areas.    The referred pain to your shoulders and bloating discomfort can improve with frequent short, non-strenuous walks (inside your home).  Try to start walking within 1 to 2 hours after surgery.  You can also place a hot pack or heating pad on the painful area (don't put it directly on your skin)  Lastly, the bloat and referred pain will dissipate with time as the body reabsorbs the gas that was placed in your abdomen.    If your pain isn't controlled after trying these things and taking pain medication, call your doctor.

## 2025-06-04 NOTE — OP NOTE
Date of Service: 6/4/2025    STAFF SURGEON:  Isaak Choudhury MD     ASSISTANT:  None.     PREOPERATIVE DIAGNOSIS: Right inguinal hernia     POSTOPERATIVE DIAGNOSIS:  Same     NAME OF PROCEDURE(S):  Robotic Xi assisted laparoscopic right inguinal hernia repair     INDICATIONS FOR PROCEDURE:  The patient is a 59-year-old male with right inguinal hernia.  I offered a minimally invasive approach to repair for him. Risks, benefits and alternatives discussed preoperatively and consent obtained.     EBL: 20 cc    ANESTHESIA:  General    COMPLICATIONS: None     DRAINS:  None.     SPECIMENS: None     IMPLANTS:   Implant Name Type Inv. Item Serial No.  Lot No. LRB No. Used Action   MESH SURGICAL INGUINAL HRN REP MACROPOR [id number]  [id number] - [id number] Mesh MESH SURGICAL INGUINAL HRN REP MACROPOR [id number]  [id number]  [employer] [id number] Right 1 Implanted       Cut to make about 10x15 cm formed mesh     OPERATIVE FINDINGS: Moderate size reducible right indirect inguinal hernia, sac empty at time of operation.  No left inguinal hernia noted.     PROCEDURE DETAIL:     Following consent, the patient was brought from the preoperative holding area to the operating suite and laid in supine position. The patient underwent general anesthesia. Abdomen was prepped and draped in usual fashion. Time out performed. Patient received antibiotics and had sequentials for DVT prophylaxis.    Following the time out I made a 2cm supraumbilical incision and dissected to the abdominal fascia which was elevated between two Kocher graspers and divided with curved dixon scissors. I placed 2 0 vicryl stay sutures on either side of the fascial defect then inserted the Chasity cannula. This was secured and attached to gas insufflation. I then inserted the camera into the abdomen and under direct vision placed the robotic 8mm ports in the right and left abdomen. Patient was positioned Trendelenberg and I examed into the pelvis revealing the  above hernia findings.   The Natanael Ulien Xi robot was then brought in and docked. I moved to the surgeon console. I began with creating a peritoneal flap on the right side and dissected in the preperitoneal space exposing Raf's ligament medially then expanding laterally. Peritoneum of the hernia sac was dissected down from the cord structures inferiorly enough to get under the mesh. I then expanded the pocket laterally until there was adequate space for the mesh.   I used an approximately 10 x 15cm formed mesh to cover the area with good overlap. Mesh secured with interrupted 3-0 vicryl sutures at Raf's ligament. The peritoneal flap was closed with running 3-0 Stratafix suture. Needles were removed and the robot undocked. I moved back to the patient bedside.   The ports were removed under direct vision and the abdomen desufflated. The supraumbilical fascial defect was closed with a figure of eight suture of 0 vicryl and the previously placed stay sutures. Skin incisions closed with 4-0 monocryl and covered with dermbond. Total of 30cc of 0.5% marcaine with epinephrine 1:200,000 used for the local. Final counts complete. Patient tolerated the procedure well and was discharge from the OR in a stable condition with plans for discharge home.           Isaak Choudhury MD

## 2025-06-14 ENCOUNTER — HEALTH MAINTENANCE LETTER (OUTPATIENT)
Age: 60
End: 2025-06-14

## 2025-06-16 DIAGNOSIS — G25.81 RESTLESS LEGS SYNDROME (RLS): ICD-10-CM

## 2025-06-16 RX ORDER — GABAPENTIN 300 MG/1
300 CAPSULE ORAL AT BEDTIME
Qty: 90 CAPSULE | Refills: 0 | Status: SHIPPED | OUTPATIENT
Start: 2025-06-16

## 2025-06-25 ENCOUNTER — OFFICE VISIT (OUTPATIENT)
Dept: SURGERY | Facility: CLINIC | Age: 60
End: 2025-06-25
Payer: COMMERCIAL

## 2025-06-25 VITALS — HEART RATE: 81 BPM | SYSTOLIC BLOOD PRESSURE: 129 MMHG | DIASTOLIC BLOOD PRESSURE: 81 MMHG

## 2025-06-25 DIAGNOSIS — Z09 S/P RIGHT INGUINAL HERNIA REPAIR, FOLLOW-UP EXAM: Primary | ICD-10-CM

## 2025-06-25 NOTE — Clinical Note
June 25, 2025      Juan Treadwell  [id number]  [place] [facility] [address]        To Whom It May Concern:    Juan Treadwell was seen in our clinic. He may return to {WORK OR SCHOOL OR :781125} without restrictions.      Sincerely,        Isaak Choudhury MD    Electronically signed

## 2025-06-25 NOTE — LETTER
6/25/2025      Juan Treadwell  [address]  [place] [facility] [place] [facility] [address]      Dear Colleague,    Thank you for referring your patient, Juan Treadwell, to the [facility]. Please see a copy of my visit note below.    General Surgery Post Op    Pt returns for follow up visit s/p robotic right inguinal hernia repair on 6/4/25.    Patient has been doing well, tolerating diet. Bowels moving well. Pain controlled. No issues with wound healing/redness/drainage. No fevers.  Right testicle still sore but improving with time    Physical exam: Vitals: /81   Pulse 81   BMI= There is no height or weight on file to calculate BMI.    Exam:  Constitutional: healthy, alert, and no distress  Gastrointestinal: Abdomen soft, non-tender. BS normal. No masses, organomegaly  Incisions healing well no infection  Right groin without recurrent hernia  Normal right testicle by palpation, mild tenderness        Assessment:     ICD-10-CM    1. S/P right inguinal hernia repair, follow-up exam  Z09         Plan: Recovering well from his robotic inguinal hernia repair.  The testicular tenderness I expect to continue to improve with more time.  Okay for activities as tolerated.  Letter provided for work with no restrictions.  Follow-up in clinic as needed.    Isaak Choudhury MD      Again, thank you for allowing me to participate in the care of your patient.        Sincerely,        Isaak Choudhury MD    Electronically signed

## 2025-06-25 NOTE — LETTER
June 25, 2025      Juan Treadwell  [id number]  [facility] [address]        To Whom It May Concern:    Juan Treadwell was seen in our clinic for postoperative check. He may return to work without restrictions 6/25/25.      Sincerely,        Isaak Choudhury MD    Electronically signed

## 2025-06-25 NOTE — PROGRESS NOTES
General Surgery Post Op    Pt returns for follow up visit s/p robotic right inguinal hernia repair on 6/4/25.    Patient has been doing well, tolerating diet. Bowels moving well. Pain controlled. No issues with wound healing/redness/drainage. No fevers.  Right testicle still sore but improving with time    Physical exam: Vitals: /81   Pulse 81   BMI= There is no height or weight on file to calculate BMI.    Exam:  Constitutional: healthy, alert, and no distress  Gastrointestinal: Abdomen soft, non-tender. BS normal. No masses, organomegaly  Incisions healing well no infection  Right groin without recurrent hernia  Normal right testicle by palpation, mild tenderness        Assessment:     ICD-10-CM    1. S/P right inguinal hernia repair, follow-up exam  Z09         Plan: Recovering well from his robotic inguinal hernia repair.  The testicular tenderness I expect to continue to improve with more time.  Okay for activities as tolerated.  Letter provided for work with no restrictions.  Follow-up in clinic as needed.    Isaak Choudhury MD

## 2025-07-02 DIAGNOSIS — K21.9 GASTROESOPHAGEAL REFLUX DISEASE WITHOUT ESOPHAGITIS: ICD-10-CM

## 2025-07-02 RX ORDER — FAMOTIDINE 40 MG/1
40 TABLET, FILM COATED ORAL DAILY
Qty: 90 TABLET | Refills: 2 | Status: SHIPPED | OUTPATIENT
Start: 2025-07-02

## 2025-07-07 ENCOUNTER — OFFICE VISIT (OUTPATIENT)
Dept: OTOLARYNGOLOGY | Facility: CLINIC | Age: 60
End: 2025-07-07
Attending: INTERNAL MEDICINE
Payer: COMMERCIAL

## 2025-07-07 VITALS — SYSTOLIC BLOOD PRESSURE: 119 MMHG | DIASTOLIC BLOOD PRESSURE: 74 MMHG | OXYGEN SATURATION: 97 % | HEART RATE: 88 BPM

## 2025-07-07 DIAGNOSIS — J31.0 CHRONIC RHINITIS: ICD-10-CM

## 2025-07-07 DIAGNOSIS — J34.89 NASAL SEPTAL PERFORATION: ICD-10-CM

## 2025-07-07 DIAGNOSIS — J31.2 CHRONIC PHARYNGITIS: ICD-10-CM

## 2025-07-07 DIAGNOSIS — J01.01 ACUTE RECURRENT MAXILLARY SINUSITIS: ICD-10-CM

## 2025-07-07 DIAGNOSIS — J31.0 PURULENT RHINITIS: Primary | ICD-10-CM

## 2025-07-07 DIAGNOSIS — K21.9 LPRD (LARYNGOPHARYNGEAL REFLUX DISEASE): ICD-10-CM

## 2025-07-07 PROCEDURE — 3074F SYST BP LT 130 MM HG: CPT | Performed by: OTOLARYNGOLOGY

## 2025-07-07 PROCEDURE — 3078F DIAST BP <80 MM HG: CPT | Performed by: OTOLARYNGOLOGY

## 2025-07-07 PROCEDURE — 31505 DIAGNOSTIC LARYNGOSCOPY: CPT | Performed by: OTOLARYNGOLOGY

## 2025-07-07 PROCEDURE — 99213 OFFICE O/P EST LOW 20 MIN: CPT | Mod: 25 | Performed by: OTOLARYNGOLOGY

## 2025-07-07 RX ORDER — FLUTICASONE PROPIONATE 50 MCG
2 SPRAY, SUSPENSION (ML) NASAL DAILY
Qty: 15.8 ML | Refills: 12 | Status: SHIPPED | OUTPATIENT
Start: 2025-07-07

## 2025-07-07 RX ORDER — OMEPRAZOLE 40 MG/1
40 CAPSULE, DELAYED RELEASE ORAL DAILY
Qty: 90 CAPSULE | Refills: 3 | Status: SHIPPED | OUTPATIENT
Start: 2025-07-07

## 2025-07-07 RX ORDER — FEXOFENADINE HCL AND PSEUDOEPHEDRINE HCI 60; 120 MG/1; MG/1
1 TABLET, EXTENDED RELEASE ORAL 2 TIMES DAILY
Qty: 180 TABLET | Refills: 3 | Status: SHIPPED | OUTPATIENT
Start: 2025-07-07

## 2025-07-07 NOTE — PROGRESS NOTES
History of Present Illness - Juan Treadwell is a very pleasant 59 year old male last seen on 11/9/2023.    To review,  for a long time he has had a chronic ulceration and crust in the nose.  It can be painful and he always has to be cleaning out these crusts.  There is also post nasal drainage from this as well.  It has been off an don for years, but has become very unbearable for the last 2 months.  It is causing a lot of issues with his CPAP as well.  He has used CPAP for over five years.    On exam, I found a previously undiagnosed large septal perforation and florid purulent rhinitis with pus covered crusts bilaterally.  I cultured, and used the results to place him on an antibiotic nasal irrigation.  He is here for follow up.  Of note, it was not possible to understand if the purulent rhinitis and recurrent digital manipulation have caused the perforation, or if the perforation was there and caused the dryness and subsequent infeciton.    On exam on 8/20/21, there had been a dramatic improvement.  About 90% better, but the perforation was unchanged.  Therefore I asked him to continue the medicated irrigations, to see if control of the purulence would allow some spontaneous improvement of the perforation.  He tells me that his symptoms are all dramatically better.  No congestion or fullness and he was lost to follow up until now.    He has returned due to the slow return of all his previous symptoms of congestion, nasal discomfort, and bloody crusts.  He tells me that things were good for a while, but over the last 6 months, especially with the onset of winter 2021 things all came back.  I use the same nasal irrigation and he was lost to follow up.    The most recent cultures were done 11/9/2023 and showed several fungal species as well as Cutibacterium.  He tells me that since seeing me in 2023 overall things have been stable, but he does note that taking Flonase and Allegra 12 hour.  And that does seem to  help.        Past medical history -   Patient Active Problem List   Diagnosis    DIAMOND (obstructive sleep apnea)- moderate (AHI 17)    Hyperlipidemia LDL goal <130    Allergic rhinitis, unspecified allergic rhinitis type    Male hypogonadism    Erectile dysfunction, unspecified erectile dysfunction type    Gastroesophageal reflux disease without esophagitis    Restless legs syndrome (RLS)    Moderate persistent asthma without complication    Nasal septal perforation    Overweight with body mass index (BMI) of 29 to 29.9 in adult    Impingement syndrome of shoulder region, left    Nontraumatic complete tear of left rotator cuff    Non-recurrent unilateral inguinal hernia without obstruction or gangrene    History of obesity       /74   Pulse 88   SpO2 97%       General - The patient is well nourished and well developed, and appears to have good nutritional status.  Alert and oriented to person and place, answers questions and cooperates with examination appropriately.   Head and Face - Normocephalic and atraumatic, with no gross asymmetry noted of the contour of the facial features.  The facial nerve is intact, with strong symmetric movements.  Eyes - Extraocular movements intact, and the pupils were reactive to light.  Sclera were not icteric or injected, conjunctiva were pink and moist.  Mouth - Examination of the oral cavity shows pink, healthy, moist mucosa.  No lesions or ulceration noted.  The dentition are in good repair.  The tongue is mobile and midline.  Nasal - The perforation is still there, but overall I am pleasantly surprised.  The rest of the mucosa looks good, no purulence or crusts.    Procedure - Mirror Laryngoscopy  To further evaluate the throat, I performed mirror laryngoscopy.  After spraying with hurricane spray and grasping the tip of the oral tongue with a gauze pad to retract it forward, I used a laryngeal mirror to examine the hypopharynx and supraglottic larynx.  The base of tongue  was symmetric and the vallecula was open.  The epiglottis was normal in appearance.  The pyriform sinuses were open bilaterally with no masses or pooling.  The back wall of the hypopharynx had a slight edema and cobblestone appearance.  The aryepiglottic folds were smooth and symmetric. The true vocal folds cords were smooth and pearly white but slightly edematous, but there were no polyps, ulcerations, or masses.  The vocal cords moved symmetrically and met in the midline.   There was a notable edema and redundant mucosa noted in the interarytenoid region and posterior commissure.          A/P - Juan Treadwell  (J31.0) Purulent rhinitis  (primary encounter diagnosis)  (J01.01) Acute recurrent maxillary sinusitis  (J34.89) Nasal septal perforation  (J31.0) Chronic rhinitis  (J31.2) Chronic pharyngitis    Continue the Allegra and Flonase, as they seem to be making a big difference and helping to prevent the purulent rhinitis.    For the foreign body sensation, I believe that this is actually laryngopharyngeal reflux.  I will treat with Omeprazole.

## 2025-07-07 NOTE — LETTER
7/7/2025      Juan Treadwell  [address]  [facility] [address]      Dear Colleague,    Thank you for referring your patient, Juan Treadwell, to the [facility]. Please see a copy of my visit note below.    History of Present Illness - Juan Treadwell is a very pleasant 59 year old male last seen on 11/9/2023.    To review,  for a long time he has had a chronic ulceration and crust in the nose.  It can be painful and he always has to be cleaning out these crusts.  There is also post nasal drainage from this as well.  It has been off an don for years, but has become very unbearable for the last 2 months.  It is causing a lot of issues with his CPAP as well.  He has used CPAP for over five years.    On exam, I found a previously undiagnosed large septal perforation and florid purulent rhinitis with pus covered crusts bilaterally.  I cultured, and used the results to place him on an antibiotic nasal irrigation.  He is here for follow up.  Of note, it was not possible to understand if the purulent rhinitis and recurrent digital manipulation have caused the perforation, or if the perforation was there and caused the dryness and subsequent infeciton.    On exam on 8/20/21, there had been a dramatic improvement.  About 90% better, but the perforation was unchanged.  Therefore I asked him to continue the medicated irrigations, to see if control of the purulence would allow some spontaneous improvement of the perforation.  He tells me that his symptoms are all dramatically better.  No congestion or fullness and he was lost to follow up until now.    He has returned due to the slow return of all his previous symptoms of congestion, nasal discomfort, and bloody crusts.  He tells me that things were good for a while, but over the last 6 months, especially with the onset of winter 2021 things all came back.  I use the same nasal irrigation and he was lost to follow up.    The most recent cultures were done  11/9/2023 and showed several fungal species as well as Cutibacterium.  He tells me that since seeing me in 2023 overall things have been stable, but he does note that taking Flonase and Allegra 12 hour.  And that does seem to help.        Past medical history -   Patient Active Problem List   Diagnosis     DIAMOND (obstructive sleep apnea)- moderate (AHI 17)     Hyperlipidemia LDL goal <130     Allergic rhinitis, unspecified allergic rhinitis type     Male hypogonadism     Erectile dysfunction, unspecified erectile dysfunction type     Gastroesophageal reflux disease without esophagitis     Restless legs syndrome (RLS)     Moderate persistent asthma without complication     Nasal septal perforation     Overweight with body mass index (BMI) of 29 to 29.9 in adult     Impingement syndrome of shoulder region, left     Nontraumatic complete tear of left rotator cuff     Non-recurrent unilateral inguinal hernia without obstruction or gangrene     History of obesity       /74   Pulse 88   SpO2 97%       General - The patient is well nourished and well developed, and appears to have good nutritional status.  Alert and oriented to person and place, answers questions and cooperates with examination appropriately.   Head and Face - Normocephalic and atraumatic, with no gross asymmetry noted of the contour of the facial features.  The facial nerve is intact, with strong symmetric movements.  Eyes - Extraocular movements intact, and the pupils were reactive to light.  Sclera were not icteric or injected, conjunctiva were pink and moist.  Mouth - Examination of the oral cavity shows pink, healthy, moist mucosa.  No lesions or ulceration noted.  The dentition are in good repair.  The tongue is mobile and midline.  Nasal - The perforation is still there, but overall I am pleasantly surprised.  The rest of the mucosa looks good, no purulence or crusts.    Procedure - Mirror Laryngoscopy  To further evaluate the throat, I  performed mirror laryngoscopy.  After spraying with hurricane spray and grasping the tip of the oral tongue with a gauze pad to retract it forward, I used a laryngeal mirror to examine the hypopharynx and supraglottic larynx.  The base of tongue was symmetric and the vallecula was open.  The epiglottis was normal in appearance.  The pyriform sinuses were open bilaterally with no masses or pooling.  The back wall of the hypopharynx had a slight edema and cobblestone appearance.  The aryepiglottic folds were smooth and symmetric. The true vocal folds cords were smooth and pearly white but slightly edematous, but there were no polyps, ulcerations, or masses.  The vocal cords moved symmetrically and met in the midline.   There was a notable edema and redundant mucosa noted in the interarytenoid region and posterior commissure.          A/P - Juan Treadwell  (J31.0) Purulent rhinitis  (primary encounter diagnosis)  (J01.01) Acute recurrent maxillary sinusitis  (J34.89) Nasal septal perforation  (J31.0) Chronic rhinitis  (J31.2) Chronic pharyngitis    Continue the Allegra and Flonase, as they seem to be making a big difference and helping to prevent the purulent rhinitis.    For the foreign body sensation, I believe that this is actually laryngopharyngeal reflux.  I will treat with Omeprazole.    Again, thank you for allowing me to participate in the care of your patient.        Sincerely,        Mendez Palmer MD    Electronically signed

## 2025-07-15 ENCOUNTER — PATIENT OUTREACH (OUTPATIENT)
Dept: CARE COORDINATION | Facility: CLINIC | Age: 60
End: 2025-07-15
Payer: COMMERCIAL

## 2025-07-17 DIAGNOSIS — N52.9 ERECTILE DYSFUNCTION, UNSPECIFIED ERECTILE DYSFUNCTION TYPE: Chronic | ICD-10-CM

## 2025-07-18 NOTE — TELEPHONE ENCOUNTER
No upcoming appointment with Dr. Porter   Will need to wait for Dr. Porter to address  I don't see addressed at appointment in may

## 2025-07-22 RX ORDER — TADALAFIL 5 MG/1
5 TABLET ORAL DAILY
Qty: 90 TABLET | Refills: 0 | Status: SHIPPED | OUTPATIENT
Start: 2025-07-22

## 2025-07-23 DIAGNOSIS — J45.40 MODERATE PERSISTENT ASTHMA WITHOUT COMPLICATION: Chronic | ICD-10-CM

## 2025-07-23 DIAGNOSIS — E78.5 HYPERLIPIDEMIA LDL GOAL <130: ICD-10-CM

## 2025-07-23 RX ORDER — MONTELUKAST SODIUM 10 MG/1
1 TABLET ORAL
Qty: 90 TABLET | Refills: 2 | Status: SHIPPED | OUTPATIENT
Start: 2025-07-23

## 2025-07-23 RX ORDER — ATORVASTATIN CALCIUM 80 MG/1
80 TABLET, FILM COATED ORAL DAILY
Qty: 90 TABLET | Refills: 2 | Status: SHIPPED | OUTPATIENT
Start: 2025-07-23

## (undated) DEVICE — SU VICRYL+ 3-0 27IN SH UND VCP416H

## (undated) DEVICE — PREP CHLORAPREP 26ML TINTED HI-LITE ORANGE 930815

## (undated) DEVICE — LINEN TOWEL PACK X5 5464

## (undated) DEVICE — SOL WATER IRRIG 500ML BOTTLE 2F7113

## (undated) DEVICE — ESU GROUND PAD ADULT W/CORD E7507

## (undated) DEVICE — TUBING SUCTION MEDI-VAC 1/4"X20' N620A

## (undated) DEVICE — DAVINCI XI DRAPE ARM 470015

## (undated) DEVICE — BLADE KNIFE SURG 11 371111

## (undated) DEVICE — BLADE CLIPPER DISP 4406

## (undated) DEVICE — ENDO TROCAR BLUNT TIP KII BALLOON 12X100MM C0R47

## (undated) DEVICE — SU MONOCRYL 4-0 PS-2 27" UND Y426H

## (undated) DEVICE — GLOVE BIOGEL PI MICRO INDICATOR UNDERGLOVE SZ 7.5 48975

## (undated) DEVICE — DAVINCI XI DRAPE COLUMN 470341

## (undated) DEVICE — SUCTION MANIFOLD NEPTUNE 2 SYS 1 PORT 702-025-000

## (undated) DEVICE — PACK LAP CHOLE CUSTOM ASC

## (undated) DEVICE — SU VICRYL+ 0 27 UR6 VLT VCP603H

## (undated) DEVICE — DAVINCI XI OBTURATOR BLADELESS 8MM 470359

## (undated) DEVICE — SU STRATAFIX PDS PLUS 3-0 SPIRAL SH 15CM SXPP1B420

## (undated) DEVICE — DRAPE MAYO STAND 23X54 8337

## (undated) DEVICE — DAVINCI XI SEAL UNIVERSAL 5-8MM 470361

## (undated) DEVICE — SUPPORTER SCROTAL SUSPENSORY LG LATEX

## (undated) DEVICE — DRAPE IOBAN INCISE 23X17" 6650EZ

## (undated) DEVICE — ESU PENCIL SMOKE EVAC W/ROCKER SWITCH 0703-047-000

## (undated) DEVICE — SU DERMABOND ADVANCED .7ML DNX12

## (undated) DEVICE — DAVINCI HOT SHEARS TIP COVER  400180

## (undated) DEVICE — SYR 50ML SLIP TIP W/O NDL 309654

## (undated) DEVICE — TUBING SMOKE EVAC PNEUVIEW 9660-XE

## (undated) DEVICE — GLOVE BIOGEL PI MICRO SZ 7.5 48575

## (undated) RX ORDER — DEXAMETHASONE SODIUM PHOSPHATE 4 MG/ML
INJECTION, SOLUTION INTRA-ARTICULAR; INTRALESIONAL; INTRAMUSCULAR; INTRAVENOUS; SOFT TISSUE
Status: DISPENSED
Start: 2025-06-04

## (undated) RX ORDER — HYDROMORPHONE HYDROCHLORIDE 1 MG/ML
INJECTION, SOLUTION INTRAMUSCULAR; INTRAVENOUS; SUBCUTANEOUS
Status: DISPENSED
Start: 2025-06-04

## (undated) RX ORDER — OXYCODONE HYDROCHLORIDE 5 MG/1
TABLET ORAL
Status: DISPENSED
Start: 2025-06-04

## (undated) RX ORDER — FENTANYL CITRATE 50 UG/ML
INJECTION, SOLUTION INTRAMUSCULAR; INTRAVENOUS
Status: DISPENSED
Start: 2025-06-04

## (undated) RX ORDER — KETOROLAC TROMETHAMINE 30 MG/ML
INJECTION, SOLUTION INTRAMUSCULAR; INTRAVENOUS
Status: DISPENSED
Start: 2025-06-04

## (undated) RX ORDER — BUPIVACAINE HYDROCHLORIDE AND EPINEPHRINE 5; 5 MG/ML; UG/ML
INJECTION, SOLUTION EPIDURAL; INTRACAUDAL; PERINEURAL
Status: DISPENSED
Start: 2025-06-04

## (undated) RX ORDER — ONDANSETRON 2 MG/ML
INJECTION INTRAMUSCULAR; INTRAVENOUS
Status: DISPENSED
Start: 2025-06-04

## (undated) RX ORDER — PROPOFOL 10 MG/ML
INJECTION, EMULSION INTRAVENOUS
Status: DISPENSED
Start: 2025-06-04

## (undated) RX ORDER — CEFAZOLIN SODIUM 2 G/50ML
SOLUTION INTRAVENOUS
Status: DISPENSED
Start: 2025-06-04

## (undated) RX ORDER — ACETAMINOPHEN 325 MG/1
TABLET ORAL
Status: DISPENSED
Start: 2025-06-04

## (undated) RX ORDER — GLYCOPYRROLATE 0.2 MG/ML
INJECTION, SOLUTION INTRAMUSCULAR; INTRAVENOUS
Status: DISPENSED
Start: 2025-06-04